# Patient Record
Sex: MALE | Race: WHITE | NOT HISPANIC OR LATINO | Employment: FULL TIME | URBAN - METROPOLITAN AREA
[De-identification: names, ages, dates, MRNs, and addresses within clinical notes are randomized per-mention and may not be internally consistent; named-entity substitution may affect disease eponyms.]

---

## 2017-01-16 ENCOUNTER — TELEPHONE (OUTPATIENT)
Dept: OPHTHALMOLOGY | Facility: CLINIC | Age: 62
End: 2017-01-16

## 2017-01-16 NOTE — TELEPHONE ENCOUNTER
Called pt LAUREN regarding the two appointments he has scheduled, 2/1/17 and 2/3/17 which appt is he coming in on. Call our office ASAP [phone].

## 2017-01-17 ENCOUNTER — TELEPHONE (OUTPATIENT)
Dept: OPHTHALMOLOGY | Facility: CLINIC | Age: 62
End: 2017-01-17

## 2017-02-03 ENCOUNTER — OFFICE VISIT (OUTPATIENT)
Dept: OPTOMETRY | Facility: CLINIC | Age: 62
End: 2017-02-03
Payer: COMMERCIAL

## 2017-02-03 DIAGNOSIS — H52.4 PRESBYOPIA: ICD-10-CM

## 2017-02-03 DIAGNOSIS — H11.432 CONJUNCTIVAL INJECTION, LEFT: ICD-10-CM

## 2017-02-03 DIAGNOSIS — H52.13 MYOPIA, BILATERAL: ICD-10-CM

## 2017-02-03 DIAGNOSIS — H25.13 NS (NUCLEAR SCLEROSIS), BILATERAL: Primary | ICD-10-CM

## 2017-02-03 PROCEDURE — 99999 PR PBB SHADOW E&M-EST. PATIENT-LVL II: CPT | Mod: PBBFAC,,, | Performed by: OPTOMETRIST

## 2017-02-03 PROCEDURE — 92014 COMPRE OPH EXAM EST PT 1/>: CPT | Mod: S$GLB,,, | Performed by: OPTOMETRIST

## 2017-02-03 PROCEDURE — 92015 DETERMINE REFRACTIVE STATE: CPT | Mod: S$GLB,,, | Performed by: OPTOMETRIST

## 2017-02-03 RX ORDER — SERTRALINE HYDROCHLORIDE 50 MG/1
1 TABLET, FILM COATED ORAL DAILY
Refills: 1 | COMMUNITY
Start: 2016-12-11 | End: 2017-02-03 | Stop reason: ALTCHOICE

## 2017-02-03 RX ORDER — DEXTROAMPHETAMINE SACCHARATE, AMPHETAMINE ASPARTATE MONOHYDRATE, DEXTROAMPHETAMINE SULFATE AND AMPHETAMINE SULFATE 2.5; 2.5; 2.5; 2.5 MG/1; MG/1; MG/1; MG/1
1 CAPSULE, EXTENDED RELEASE ORAL DAILY
Refills: 0 | COMMUNITY
Start: 2017-01-18 | End: 2019-04-05

## 2017-02-03 RX ORDER — METHYLPHENIDATE HYDROCHLORIDE 20 MG/1
1 TABLET ORAL DAILY
Refills: 0 | COMMUNITY
Start: 2017-01-02 | End: 2019-04-05

## 2017-02-03 RX ORDER — DEXTROAMPHETAMINE SACCHARATE, AMPHETAMINE ASPARTATE MONOHYDRATE, DEXTROAMPHETAMINE SULFATE AND AMPHETAMINE SULFATE 7.5; 7.5; 7.5; 7.5 MG/1; MG/1; MG/1; MG/1
1 CAPSULE, EXTENDED RELEASE ORAL DAILY
Refills: 0 | COMMUNITY
Start: 2017-01-02 | End: 2021-09-09 | Stop reason: SDUPTHER

## 2017-02-03 NOTE — PROGRESS NOTES
HPI     Blurred Vision    Additional comments: computer vision is better with out RX           Comments   61 yr old here for continued care for his [employer] [facility].  History of   Cataracts ou.  Pt states that he wears trifocal glasses and wearing them   full time.  He states that he noticed that he is having trouble seeing the   computer, he takes the glasses off and can see the computer perfectly.  He   has also noticed a slight decrease in the distance vision.  Reading vision   is fine with glasses wear.  This change started 2-3 months ago and   gradually is getting worse.    No pain  No headache  +diplopia.  had two episodes of diplopia after working out.  Images were   side by side, lasted 15 secs.  He closed his eyes and after opening   diplopia was gone.    No flashes or floaters  History of ocular migraines  No drops.        Last edited by Keli Rivas on 2/3/2017  8:14 AM. (History)            Assessment /Plan     For exam results, see Encounter Report.    NS (nuclear sclerosis), bilateral   Mild, monitor    Conjunctival injection, left   Use systane BID/PRN    Myopia, bilateral  Presbyopia   Current specs oK   Rx specs for computer/ reading for work    Intermittent diplopia 2 times in the past year   Both episodes after exercise   Vision returned to normal within seconds   No ocular abnormalities today   Return if diplopia persists or vision does not return to normal    RTC 1 year DFE

## 2017-03-17 ENCOUNTER — PATIENT OUTREACH (OUTPATIENT)
Dept: ADMINISTRATIVE | Facility: HOSPITAL | Age: 62
End: 2017-03-17

## 2017-04-03 ENCOUNTER — OFFICE VISIT (OUTPATIENT)
Dept: INTERNAL MEDICINE | Facility: CLINIC | Age: 62
End: 2017-04-03
Attending: INTERNAL MEDICINE
Payer: COMMERCIAL

## 2017-04-03 VITALS
OXYGEN SATURATION: 97 % | SYSTOLIC BLOOD PRESSURE: 100 MMHG | DIASTOLIC BLOOD PRESSURE: 70 MMHG | WEIGHT: 201.5 LBS | HEART RATE: 76 BPM | BODY MASS INDEX: 25.86 KG/M2 | HEIGHT: 74 IN

## 2017-04-03 DIAGNOSIS — Z00.00 ANNUAL PHYSICAL EXAM: Primary | ICD-10-CM

## 2017-04-03 DIAGNOSIS — Z12.5 SCREENING PSA (PROSTATE SPECIFIC ANTIGEN): ICD-10-CM

## 2017-04-03 DIAGNOSIS — C61 PROSTATE CANCER: ICD-10-CM

## 2017-04-03 DIAGNOSIS — D22.9 ATYPICAL NEVUS: ICD-10-CM

## 2017-04-03 PROCEDURE — 99999 PR PBB SHADOW E&M-EST. PATIENT-LVL III: CPT | Mod: PBBFAC,,, | Performed by: INTERNAL MEDICINE

## 2017-04-03 PROCEDURE — 93010 ELECTROCARDIOGRAM REPORT: CPT | Mod: S$GLB,,, | Performed by: INTERNAL MEDICINE

## 2017-04-03 PROCEDURE — 93005 ELECTROCARDIOGRAM TRACING: CPT | Mod: S$GLB,,, | Performed by: INTERNAL MEDICINE

## 2017-04-03 PROCEDURE — 99396 PREV VISIT EST AGE 40-64: CPT | Mod: S$GLB,,, | Performed by: INTERNAL MEDICINE

## 2017-04-03 RX ORDER — CLONAZEPAM 0.12 MG/1
TABLET, ORALLY DISINTEGRATING ORAL
Refills: 0 | COMMUNITY
Start: 2017-03-31 | End: 2019-04-05

## 2017-04-03 RX ORDER — DEXTROAMPHETAMINE SACCHARATE, AMPHETAMINE ASPARTATE MONOHYDRATE, DEXTROAMPHETAMINE SULFATE AND AMPHETAMINE SULFATE 5; 5; 5; 5 MG/1; MG/1; MG/1; MG/1
CAPSULE, EXTENDED RELEASE ORAL
Refills: 0 | COMMUNITY
Start: 2017-02-03 | End: 2021-09-09 | Stop reason: SDUPTHER

## 2017-04-03 NOTE — PROGRESS NOTES
Subjective:       Patient ID: Lewis Fish is a 61 y.o. male.    Chief Complaint: Annual Exam     Lewis Fish is a 61 y.o.  male who presents for Annual Exam  .  Patient Active Problem List   Diagnosis    Prostate cancer    Atypical nevus    ADHD (attention deficit hyperactivity disorder)    CHEO (generalized anxiety disorder)     HPI Comments:  Lewis Fish is a 61 y.o.  male who presents for Annual Exam  He is doing well, exercising regularly.  Concerned a mole on his chest, started several months ago, feels it may be getting bigger.      Health Maintenance       Date Due Completion Date    TETANUS VACCINE 11/20/1975 ---    Influenza Vaccine 8/1/2016 11/3/2015 (Done)    Override on 11/3/2015: Done    PROSTATE-SPECIFIC ANTIGEN 1/28/2017 1/28/2016    Colonoscopy 7/12/2021 7/12/2011 (Done)    Override on 7/12/2011: Done    Lipid Panel 11/30/2021 11/30/2016          Review of Systems   Constitutional: Negative for chills and fever.   HENT: Negative for rhinorrhea and sore throat.    Respiratory: Negative for cough and shortness of breath.    Cardiovascular: Negative for chest pain and palpitations.   Gastrointestinal: Negative for nausea and vomiting.   Genitourinary: Negative for dysuria and hematuria.   Musculoskeletal: Negative for arthralgias and back pain.   Skin: Negative for color change and rash.   Neurological: Negative for weakness and numbness.   Psychiatric/Behavioral: Negative for agitation and dysphoric mood.         Past Medical History:   Diagnosis Date    ADHD (attention deficit hyperactivity disorder)     Allergy seasonal    Anxiety     Depression     Family history of early CAD     brother with CAD age 49    History of psychiatric care     Prostate cancer prostate    bradytherapy    Psychiatric problem     Therapy        Past Surgical History:   Procedure Laterality Date    ANKLE LIGAMENT RECONSTRUCTION  2014    right    HERNIA REPAIR  1996    inguinal bilateral    testicle  "removed      complication of hernia repair    TUMOR REMOVAL  1996    nail bed       Family History   Problem Relation Age of Onset    Arthritis Mother     Skin cancer Mother     Asthma Brother     Alcohol abuse Maternal Grandfather     Alcohol abuse Paternal Grandmother     Skin cancer Maternal Grandmother     Macular degeneration Maternal Grandmother     Coronary artery disease Brother 49    Glaucoma Neg Hx     Strabismus Neg Hx     Stroke Neg Hx     Blindness Neg Hx        Social History   Substance Use Topics    Smoking status: Never Smoker    Smokeless tobacco: None    Alcohol use 0.6 oz/week     1 Cans of beer per week      Comment: less than one a week             Objective:   Blood pressure 100/70, pulse 76, height 6' 2" (1.88 m), weight 91.4 kg (201 lb 8 oz), SpO2 97 %.     Physical Exam   Constitutional: He is oriented to person, place, and time. He appears well-developed and well-nourished. No distress.   HENT:   Head: Normocephalic and atraumatic.   Right Ear: External ear normal.   Left Ear: External ear normal.   Eyes: Conjunctivae are normal. No scleral icterus.   Neck: No JVD present. No thyromegaly present.   Cardiovascular: Normal heart sounds.  Exam reveals no gallop and no friction rub.    No murmur heard.  Pulmonary/Chest: Effort normal and breath sounds normal. He has no wheezes. He has no rales.   Abdominal: Soft. Bowel sounds are normal. He exhibits no distension. There is no tenderness.   Musculoskeletal: He exhibits no edema or tenderness.   Lymphadenopathy:     He has no cervical adenopathy.   Neurological: He is alert and oriented to person, place, and time.   Skin: Skin is warm and dry.   4mm raised eyrthematous plaque on left chest wall   Psychiatric: He has a normal mood and affect. Thought content normal.       Prior labs reviewed  Assessment/Plan:        Lewis was seen today for annual exam.    Diagnoses and all orders for this visit:    Annual physical exam  -     " EKG 12-lead baseline  Recommend daily sunscreen, cardiovascular exercise min 30 min 5 days per week. Seatbelts routinely.      Atypical nevus  -     Ambulatory Referral to Dermatology    Prostate cancer  -     PSA, Screening; Future  Screening PSA (prostate specific antigen)  Follows with fernando    Other orders  -     Cancel: Pneumococcal Conjugate Vaccine (13 Valent) (IM)  -     Cancel: Tdap Vaccine  -     Cancel: PSA, Screening; Future

## 2017-04-03 NOTE — MR AVS SNAPSHOT
[facility] - Internal Medicine  [address]  [place] [address]  Phone: [phone]  Fax: [phone]                  Lewis Fish   4/3/2017 10:00 AM   Office Visit    Description:  Male : 1955   Provider:  Arlene Finch MD   Department:  [facility] Internal Medicine           Reason for Visit     Annual Exam           Diagnoses this Visit        Comments    Annual physical exam    -  Primary     Screening PSA (prostate specific antigen)         Atypical nevus         Prostate cancer                To Do List           Future Appointments        Provider Department Dept Phone    2017 7:30 AM LAB, SAME DAY [facility] [phone]      Goals (5 Years of Data)     None      Follow-Up and Disposition     Return in about 1 year (around 4/3/2018) for labs now, RTC for annual physical.    Follow-up and Disposition History      [facility]     [facility] Nurse Care Line -  Assistance  Unless otherwise directed by your provider, please contact [facility], our nurse care line that is available for  assistance.     Registered nurses in the [facility] Center provide: appointment scheduling, clinical advisement, health education, and other advisory services.  Call: [phone] (toll free)               Medications                Verify that the below list of medications is an accurate representation of the medications you are currently taking.  If none reported, the list may be blank. If incorrect, please contact your healthcare provider. Carry this list with you in case of emergency.           Current Medications     cetirizine (ZYRTEC) 10 MG tablet Take 10 mg by mouth once daily.    dextroamphetamine-amphetamine (ADDERALL XR) 20 MG 24 hr capsule TK 2 CAPSULES PO ONCE D    methylphenidate (RITALIN) 20 MG tablet Take 1 tablet by mouth once daily.    sertraline (ZOLOFT) 100 MG tablet Take 1 tablet (100 mg total) by mouth every evening.     "tadalafil (CIALIS) 20 MG Tab Take 1 tablet (20 mg total) by mouth every other day. Take every other day as needed    clonazepam (KLONOPIN) 0.125 MG disintegrating tablet TK 1 OR 2 TABLETS PO QPM    dextroamphetamine-amphetamine (ADDERALL XR) 10 MG 24 hr capsule Take 1 capsule by mouth once daily.    dextroamphetamine-amphetamine (ADDERALL XR) 30 MG 24 hr capsule Take 1 capsule by mouth once daily.           Clinical Reference Information           Your Vitals Were     BP Pulse Height Weight SpO2 BMI    100/70 (BP Location: Left arm, Patient Position: Sitting, BP Method: Manual) 76 6' 2" (1.88 m) 91.4 kg (201 lb 8 oz) 97% 25.87 kg/m2      Blood Pressure          Most Recent Value    BP  100/70      Allergies as of 4/3/2017     No Known Allergies      Immunizations Administered on Date of Encounter - 4/3/2017     None      Orders Placed During Today's Visit      Normal Orders This Visit    Ambulatory Referral to Dermatology     EKG 12-lead     Future Labs/Procedures Expected by Expires    PSA, Screening  4/3/2017 6/2/2018    Comprehensive metabolic panel  4/3/2018 (Approximate) 4/3/2019    Lipid panel  4/3/2018 (Approximate) 4/3/2019      Instructions    Your test results will be communicated to you via: [facility], Telephone or Letter.  If you have not received your test results within one week. Please contact the clinic at [phone].      Please contact Dr. Vandana Benson MD in Dermatology department to schedule your appointment at [phone].         Language Assistance Services     ATTENTION: Language assistance services are available, free of charge. Please call [phone].      ATENCIÓN: Si habla español, tiene a bermudez disposición servicios gratuitos de asistencia lingüística. Llame al [phone].     CHÚ Ý: N?u b?n nói Ti?ng Vi?t, có các d?ch v? h? tr? ngôn ng? mi?n phí dành cho b?n. G?i s? [phone].         [facility] complies with applicable Federal civil rights laws and does " not discriminate on the basis of race, color, national origin, age, disability, or sex.

## 2017-04-03 NOTE — PATIENT INSTRUCTIONS
Your test results will be communicated to you via: [facility], Telephone or Letter.  If you have not received your test results within one week. Please contact the clinic at [phone].      Please contact Dr. Vandana Benson MD in Dermatology department to schedule your appointment at [phone].

## 2017-04-03 NOTE — PROGRESS NOTES
Able to schedule appt with Derm Dr. Benson, pt will contact office to schedule appt per his convenience

## 2017-04-07 ENCOUNTER — LAB VISIT (OUTPATIENT)
Dept: LAB | Facility: OTHER | Age: 62
End: 2017-04-07
Attending: INTERNAL MEDICINE
Payer: COMMERCIAL

## 2017-04-07 DIAGNOSIS — C61 PROSTATE CANCER: ICD-10-CM

## 2017-04-07 DIAGNOSIS — Z00.00 ANNUAL PHYSICAL EXAM: ICD-10-CM

## 2017-04-07 LAB
ALBUMIN SERPL BCP-MCNC: 4.1 G/DL
ALP SERPL-CCNC: 58 U/L
ALT SERPL W/O P-5'-P-CCNC: 21 U/L
ANION GAP SERPL CALC-SCNC: 8 MMOL/L
AST SERPL-CCNC: 31 U/L
BILIRUB SERPL-MCNC: 1.6 MG/DL
BUN SERPL-MCNC: 21 MG/DL
CALCIUM SERPL-MCNC: 8.9 MG/DL
CHLORIDE SERPL-SCNC: 107 MMOL/L
CHOLEST/HDLC SERPL: 4.9 {RATIO}
CO2 SERPL-SCNC: 26 MMOL/L
COMPLEXED PSA SERPL-MCNC: 0.02 NG/ML
CREAT SERPL-MCNC: 1.3 MG/DL
EST. GFR  (AFRICAN AMERICAN): >60 ML/MIN/1.73 M^2
EST. GFR  (NON AFRICAN AMERICAN): 60 ML/MIN/1.73 M^2
GLUCOSE SERPL-MCNC: 98 MG/DL
HDL/CHOLESTEROL RATIO: 20.5 %
HDLC SERPL-MCNC: 171 MG/DL
HDLC SERPL-MCNC: 35 MG/DL
LDLC SERPL CALC-MCNC: 106.4 MG/DL
NONHDLC SERPL-MCNC: 136 MG/DL
POTASSIUM SERPL-SCNC: 4.6 MMOL/L
PROT SERPL-MCNC: 6.7 G/DL
SODIUM SERPL-SCNC: 141 MMOL/L
TRIGL SERPL-MCNC: 148 MG/DL

## 2017-04-07 PROCEDURE — 36415 COLL VENOUS BLD VENIPUNCTURE: CPT

## 2017-04-07 PROCEDURE — 80053 COMPREHEN METABOLIC PANEL: CPT

## 2017-04-07 PROCEDURE — 84153 ASSAY OF PSA TOTAL: CPT

## 2017-04-07 PROCEDURE — 80061 LIPID PANEL: CPT

## 2018-02-04 ENCOUNTER — PATIENT MESSAGE (OUTPATIENT)
Dept: INTERNAL MEDICINE | Facility: CLINIC | Age: 63
End: 2018-02-04

## 2018-02-04 DIAGNOSIS — N53.9 MALE SEXUAL DYSFUNCTION: Primary | ICD-10-CM

## 2018-02-05 RX ORDER — TADALAFIL 20 MG/1
20 TABLET ORAL DAILY
Qty: 10 TABLET | Refills: 11 | Status: SHIPPED | OUTPATIENT
Start: 2018-02-05 | End: 2018-02-09 | Stop reason: SDUPTHER

## 2018-02-05 NOTE — TELEPHONE ENCOUNTER
Pt requesting to restart cialis. Does not need visit.  No pharmacy listed. Please call in prescription to pharmacy of his choice as I entered into his med list.

## 2018-02-09 ENCOUNTER — TELEPHONE (OUTPATIENT)
Dept: INTERNAL MEDICINE | Facility: CLINIC | Age: 63
End: 2018-02-09

## 2018-02-09 RX ORDER — TADALAFIL 20 MG/1
20 TABLET ORAL DAILY PRN
Qty: 10 TABLET | Refills: 11 | Status: SHIPPED | OUTPATIENT
Start: 2018-02-09 | End: 2018-02-14 | Stop reason: SDUPTHER

## 2018-02-14 ENCOUNTER — TELEPHONE (OUTPATIENT)
Dept: INTERNAL MEDICINE | Facility: CLINIC | Age: 63
End: 2018-02-14

## 2018-02-14 DIAGNOSIS — N53.9 MALE SEXUAL DYSFUNCTION: ICD-10-CM

## 2018-02-14 RX ORDER — TADALAFIL 20 MG/1
20 TABLET ORAL DAILY PRN
Qty: 10 TABLET | Refills: 11 | Status: SHIPPED | OUTPATIENT
Start: 2018-02-14 | End: 2018-02-14 | Stop reason: SDUPTHER

## 2018-02-14 RX ORDER — TADALAFIL 20 MG/1
20 TABLET ORAL DAILY PRN
Qty: 10 TABLET | Refills: 11 | Status: SHIPPED | OUTPATIENT
Start: 2018-02-14 | End: 2023-02-14

## 2018-02-20 ENCOUNTER — OFFICE VISIT (OUTPATIENT)
Dept: OPTOMETRY | Facility: CLINIC | Age: 63
End: 2018-02-20
Payer: COMMERCIAL

## 2018-02-20 DIAGNOSIS — H52.4 PRESBYOPIA: ICD-10-CM

## 2018-02-20 DIAGNOSIS — H52.13 MYOPIA, BILATERAL: ICD-10-CM

## 2018-02-20 DIAGNOSIS — H25.13 NS (NUCLEAR SCLEROSIS), BILATERAL: Primary | ICD-10-CM

## 2018-02-20 PROCEDURE — 99999 PR PBB SHADOW E&M-EST. PATIENT-LVL II: CPT | Mod: PBBFAC,,, | Performed by: OPTOMETRIST

## 2018-02-20 PROCEDURE — 92015 DETERMINE REFRACTIVE STATE: CPT | Mod: S$GLB,,, | Performed by: OPTOMETRIST

## 2018-02-20 PROCEDURE — 92014 COMPRE OPH EXAM EST PT 1/>: CPT | Mod: S$GLB,,, | Performed by: OPTOMETRIST

## 2018-02-27 NOTE — PROGRESS NOTES
HPI     Patient's age: 62 y.o.    Approximate date of last eye examination:  2/3/17  Name of last eye doctor seen: Dr. Whipple    Pt states that he is here for annual eye exam, been having trouble with   computer/reading glasses not so clear left in car.    Wears glasses? yes       Wears CLs?:  no            Headaches?  no  Eye pain/discomfort?  no                                                                                     Flashes?  no  Floaters?  no  Diplopia/Double vision?  no    Patient's Ocular History:         Any eye surgeries? no         Family history of eye disease?  no    Significant patient medical history:         1. Diabetes?  no       If yes, IDDM or NIDDM? no   2. HBP?  no                 ! OTC eyedrops currently using:  none   ! Prescription eye meds currently using:  none         Last edited by Marla Jeong MA on 2/20/2018  9:24 AM. (History)            Assessment /Plan     For exam results, see Encounter Report.    NS (nuclear sclerosis), bilateral    Myopia, bilateral    Presbyopia      Good overall ocular health, monitor yearly    RX specs

## 2019-01-17 ENCOUNTER — LAB VISIT (OUTPATIENT)
Dept: LAB | Facility: OTHER | Age: 64
End: 2019-01-17
Attending: INTERNAL MEDICINE
Payer: COMMERCIAL

## 2019-01-17 ENCOUNTER — OFFICE VISIT (OUTPATIENT)
Dept: INTERNAL MEDICINE | Facility: CLINIC | Age: 64
End: 2019-01-17
Attending: INTERNAL MEDICINE
Payer: COMMERCIAL

## 2019-01-17 VITALS
WEIGHT: 203.94 LBS | HEIGHT: 74 IN | OXYGEN SATURATION: 98 % | HEART RATE: 93 BPM | BODY MASS INDEX: 26.17 KG/M2 | SYSTOLIC BLOOD PRESSURE: 120 MMHG | DIASTOLIC BLOOD PRESSURE: 60 MMHG

## 2019-01-17 DIAGNOSIS — Z00.00 ANNUAL PHYSICAL EXAM: ICD-10-CM

## 2019-01-17 DIAGNOSIS — N52.9 ERECTILE DYSFUNCTION, UNSPECIFIED ERECTILE DYSFUNCTION TYPE: ICD-10-CM

## 2019-01-17 DIAGNOSIS — Z00.00 ANNUAL PHYSICAL EXAM: Primary | ICD-10-CM

## 2019-01-17 DIAGNOSIS — R68.82 LOSS OF LIBIDO: ICD-10-CM

## 2019-01-17 DIAGNOSIS — Z85.46 PERSONAL HISTORY OF PROSTATE CANCER: ICD-10-CM

## 2019-01-17 LAB
ALBUMIN SERPL BCP-MCNC: 4.2 G/DL
ALP SERPL-CCNC: 62 U/L
ALT SERPL W/O P-5'-P-CCNC: 21 U/L
ANION GAP SERPL CALC-SCNC: 9 MMOL/L
AST SERPL-CCNC: 17 U/L
BILIRUB SERPL-MCNC: 1.8 MG/DL
BUN SERPL-MCNC: 21 MG/DL
CALCIUM SERPL-MCNC: 9.4 MG/DL
CHLORIDE SERPL-SCNC: 106 MMOL/L
CO2 SERPL-SCNC: 27 MMOL/L
COMPLEXED PSA SERPL-MCNC: <0.01 NG/ML
CREAT SERPL-MCNC: 1.4 MG/DL
EST. GFR  (AFRICAN AMERICAN): >60 ML/MIN/1.73 M^2
EST. GFR  (NON AFRICAN AMERICAN): 54 ML/MIN/1.73 M^2
GLUCOSE SERPL-MCNC: 117 MG/DL
POTASSIUM SERPL-SCNC: 4.2 MMOL/L
PROT SERPL-MCNC: 7.2 G/DL
SODIUM SERPL-SCNC: 142 MMOL/L
T3FREE SERPL-MCNC: 2.7 PG/ML
T4 FREE SERPL-MCNC: 0.84 NG/DL
TSH SERPL DL<=0.005 MIU/L-ACNC: 1.83 UIU/ML

## 2019-01-17 PROCEDURE — 84439 ASSAY OF FREE THYROXINE: CPT

## 2019-01-17 PROCEDURE — 99999 PR PBB SHADOW E&M-EST. PATIENT-LVL IV: CPT | Mod: PBBFAC,,, | Performed by: INTERNAL MEDICINE

## 2019-01-17 PROCEDURE — 36415 COLL VENOUS BLD VENIPUNCTURE: CPT

## 2019-01-17 PROCEDURE — 90471 IMMUNIZATION ADMIN: CPT | Mod: S$GLB,,, | Performed by: INTERNAL MEDICINE

## 2019-01-17 PROCEDURE — 84153 ASSAY OF PSA TOTAL: CPT

## 2019-01-17 PROCEDURE — 99999 PR PBB SHADOW E&M-EST. PATIENT-LVL IV: ICD-10-PCS | Mod: PBBFAC,,, | Performed by: INTERNAL MEDICINE

## 2019-01-17 PROCEDURE — 80053 COMPREHEN METABOLIC PANEL: CPT

## 2019-01-17 PROCEDURE — 99396 PR PREVENTIVE VISIT,EST,40-64: ICD-10-PCS | Mod: S$GLB,,, | Performed by: INTERNAL MEDICINE

## 2019-01-17 PROCEDURE — 84443 ASSAY THYROID STIM HORMONE: CPT

## 2019-01-17 PROCEDURE — 90732 PNEUMOCOCCAL POLYSACCHARIDE VACCINE 23-VALENT =>2YO SQ IM: ICD-10-PCS | Mod: S$GLB,,, | Performed by: INTERNAL MEDICINE

## 2019-01-17 PROCEDURE — 90471 PNEUMOCOCCAL POLYSACCHARIDE VACCINE 23-VALENT =>2YO SQ IM: ICD-10-PCS | Mod: S$GLB,,, | Performed by: INTERNAL MEDICINE

## 2019-01-17 PROCEDURE — 90732 PPSV23 VACC 2 YRS+ SUBQ/IM: CPT | Mod: S$GLB,,, | Performed by: INTERNAL MEDICINE

## 2019-01-17 PROCEDURE — 99396 PREV VISIT EST AGE 40-64: CPT | Mod: S$GLB,,, | Performed by: INTERNAL MEDICINE

## 2019-01-17 PROCEDURE — 84481 FREE ASSAY (FT-3): CPT

## 2019-01-17 NOTE — PROGRESS NOTES
Subjective:       Patient ID: Lewis Fish is a 63 y.o. male.    Chief Complaint: Annual Exam     Lewis Fish is a 63 y.o.  male who presents for Annual Exam  .  Patient Active Problem List   Diagnosis    Prostate cancer    Atypical nevus    ADHD (attention deficit hyperactivity disorder)    CHEO (generalized anxiety disorder)     Here for annual exam.      Complains of loss of libido. Affecting his relationship. Denies relationship stress other than this issue.  Has history of ED and prostate cancer.  No loss of vigor or muscle mass. Exericses strenuously and lifts weights.  Sleeping well. Feels connected and loving towards wife.  Denies depression. Seeing psych for ADHD and anxiety. Has been treated with zoloft for several years without prior issues.      Health Maintenance       Date Due Completion Date    TETANUS VACCINE 11/20/1975 ---    Pneumococcal Vaccine (Highest Risk) (1 of 3 - PCV13) 11/20/1976 ---    Zoster Vaccine 11/20/2017 ---    PROSTATE-SPECIFIC ANTIGEN 04/07/2018 4/7/2017    Influenza Vaccine 08/01/2018 1/9/2017 (Done)    Override on 1/9/2017: Done    Override on 11/3/2015: Done    Colonoscopy 07/12/2021 7/12/2011 (Done)    Override on 7/12/2011: Done    Lipid Panel 04/07/2022 4/7/2017          Review of Systems   Constitutional: Negative for chills, fatigue and fever.   HENT: Negative for rhinorrhea and sore throat.    Respiratory: Negative for cough and shortness of breath.    Cardiovascular: Negative for chest pain and palpitations.   Gastrointestinal: Negative for nausea and vomiting.   Genitourinary: Negative for dysuria and hematuria.   Musculoskeletal: Negative for arthralgias and back pain.   Skin: Negative for color change and rash.   Neurological: Negative for weakness and numbness.   Psychiatric/Behavioral: Negative for agitation and dysphoric mood.         Past Medical History:   Diagnosis Date    ADHD (attention deficit hyperactivity disorder)     Allergy seasonal     "Anxiety     Depression     Family history of early CAD     brother with CAD age 49    History of psychiatric care     Prostate cancer prostate    bradytherapy    Psychiatric problem     Therapy        Past Surgical History:   Procedure Laterality Date    ANKLE LIGAMENT RECONSTRUCTION  2014    right    HERNIA REPAIR  1996    inguinal bilateral    testicle removed      complication of hernia repair    TUMOR REMOVAL  1996    nail bed       Family History   Problem Relation Age of Onset    Arthritis Mother     Skin cancer Mother     Asthma Brother     Alcohol abuse Maternal Grandfather     Alcohol abuse Paternal Grandmother     Skin cancer Maternal Grandmother     Macular degeneration Maternal Grandmother     Coronary artery disease Brother 49    Glaucoma Neg Hx     Strabismus Neg Hx     Stroke Neg Hx     Blindness Neg Hx        Social History     Tobacco Use    Smoking status: Never Smoker   Substance Use Topics    Alcohol use: Yes     Alcohol/week: 0.6 oz     Types: 1 Cans of beer per week     Comment: less than one a week    Drug use: No             Objective:   Blood pressure 120/60, pulse 93, height 6' 2" (1.88 m), weight 92.5 kg (203 lb 14.8 oz), SpO2 98 %.     Physical Exam   Constitutional: He is oriented to person, place, and time. He appears well-developed and well-nourished. No distress.   HENT:   Head: Normocephalic and atraumatic.   Right Ear: External ear normal.   Left Ear: External ear normal.   Eyes: Conjunctivae are normal. No scleral icterus.   Neck: No JVD present. No thyromegaly present.   Cardiovascular: Normal heart sounds. Exam reveals no gallop and no friction rub.   No murmur heard.  Pulmonary/Chest: Effort normal and breath sounds normal. He has no wheezes. He has no rales.   No gynecomastia   Abdominal: Soft. Bowel sounds are normal. He exhibits no distension. There is no tenderness.   Musculoskeletal: He exhibits no edema or tenderness.   Normal muscle mass "   Lymphadenopathy:     He has no cervical adenopathy.   Neurological: He is alert and oriented to person, place, and time.   Skin: Skin is warm and dry.   Psychiatric: He has a normal mood and affect. Thought content normal.       Prior labs reviewed  Assessment/Plan:        Lewis was seen today for annual exam.    Diagnoses and all orders for this visit:    Annual physical exam  -     Comprehensive metabolic panel; Future  -     Cancel: PSA, Screening; Future  -     TSH; Future  -     T4, free; Future  -     T3, free; Future  Recommend daily sunscreen, cardiovascular exercise min 30 min 5 days per week. Seatbelts routinely.    Personal history of prostate cancer  -     Prostate Specific Antigen, Diagnostic; Future  Recommend regular PSA    Erectile dysfunction, unspecified erectile dysfunction type  Cont cialis prn    Loss of libido  Unlikely to be related to testosterone due t history and pt not good candidate for replacement due to CA history  Recommend he discuss his zoloft with his psychiatrist and discuss his concerns with his spouse  Follow up with urology if no improvement    Other orders  -     (In Office Administered) Pneumococcal Polysaccharide Vaccine (23 Valent) (SQ/IM)           Medication List           Accurate as of 1/17/19 11:59 PM. If you have any questions, ask your nurse or doctor.               CHANGE how you take these medications    sertraline 100 MG tablet  Commonly known as:  ZOLOFT  Take 1 tablet (100 mg total) by mouth every evening.  What changed:  how much to take        CONTINUE taking these medications    cetirizine 10 MG tablet  Commonly known as:  ZYRTEC     clonazePAM 0.125 MG disintegrating tablet  Commonly known as:  KLONOPIN     * dextroamphetamine-amphetamine 30 MG 24 hr capsule  Commonly known as:  ADDERALL XR     * dextroamphetamine-amphetamine 10 MG 24 hr capsule  Commonly known as:  ADDERALL XR     * dextroamphetamine-amphetamine 20 MG 24 hr capsule  Commonly known as:   ADDERALL XR     methylphenidate HCl 20 MG tablet  Commonly known as:  RITALIN     tadalafil 20 MG Tab  Commonly known as:  CIALIS  Take 1 tablet (20 mg total) by mouth daily as needed.         * This list has 3 medication(s) that are the same as other medications prescribed for you. Read the directions carefully, and ask your doctor or other care provider to review them with you.

## 2019-01-17 NOTE — PROGRESS NOTES
"Patient was given vaccine information sheet for the [id number] (pneumococcal polyvalent) vaccine. The area of injection was palpated using the acromion process as a landmark. This area was cleaned with alcohol. Using a 25g 1" safety needle, 0.5mL of the vaccine was placed into the left muscle. The injection site was dressed with a bandage. Patient experienced no complications and was discharged in stable condition. Pneumovax 23 (pneumococcal polyvalent) vaccine Lot: [id number] Exp: 04/14/2020  "

## 2019-03-20 ENCOUNTER — PATIENT MESSAGE (OUTPATIENT)
Dept: INTERNAL MEDICINE | Facility: CLINIC | Age: 64
End: 2019-03-20

## 2019-04-05 ENCOUNTER — OFFICE VISIT (OUTPATIENT)
Dept: OPTOMETRY | Facility: CLINIC | Age: 64
End: 2019-04-05
Payer: COMMERCIAL

## 2019-04-05 DIAGNOSIS — H52.13 MYOPIA, BILATERAL: ICD-10-CM

## 2019-04-05 DIAGNOSIS — H25.13 NS (NUCLEAR SCLEROSIS), BILATERAL: Primary | ICD-10-CM

## 2019-04-05 DIAGNOSIS — H52.223 REGULAR ASTIGMATISM OF BOTH EYES: ICD-10-CM

## 2019-04-05 DIAGNOSIS — H52.4 PRESBYOPIA: ICD-10-CM

## 2019-04-05 DIAGNOSIS — Z04.9 DISEASE RULED OUT AFTER EXAMINATION: ICD-10-CM

## 2019-04-05 PROCEDURE — 92015 PR REFRACTION: ICD-10-PCS | Mod: S$GLB,,, | Performed by: OPTOMETRIST

## 2019-04-05 PROCEDURE — 92015 DETERMINE REFRACTIVE STATE: CPT | Mod: S$GLB,,, | Performed by: OPTOMETRIST

## 2019-04-05 PROCEDURE — 99999 PR PBB SHADOW E&M-EST. PATIENT-LVL II: ICD-10-PCS | Mod: PBBFAC,,, | Performed by: OPTOMETRIST

## 2019-04-05 PROCEDURE — 99999 PR PBB SHADOW E&M-EST. PATIENT-LVL II: CPT | Mod: PBBFAC,,, | Performed by: OPTOMETRIST

## 2019-04-05 PROCEDURE — 92014 COMPRE OPH EXAM EST PT 1/>: CPT | Mod: S$GLB,,, | Performed by: OPTOMETRIST

## 2019-04-05 PROCEDURE — 92014 PR EYE EXAM, EST PATIENT,COMPREHESV: ICD-10-PCS | Mod: S$GLB,,, | Performed by: OPTOMETRIST

## 2019-04-05 NOTE — PROGRESS NOTES
HPI     Pt here for annual exam. Pt says hes had some trouble with computer   distance. He feels like he has to get close to be able to see the screen.   Pt denies any flashes/floaters, itching/burning, eye pain, or double   vision. Pt does not use any eye gtts.     Last edited by Kerri Mix on 4/5/2019  1:37 PM. (History)            Assessment /Plan     For exam results, see Encounter Report.    NS (nuclear sclerosis), bilateral   Mild, monitor    Regular astigmatism of both eyes  Myopia, bilateral  Presbyopia    Disease ruled out after examination      Good overall ocular health, monitor yearly     RX specs

## 2019-12-16 ENCOUNTER — OFFICE VISIT (OUTPATIENT)
Dept: DERMATOLOGY | Facility: CLINIC | Age: 64
End: 2019-12-16
Payer: COMMERCIAL

## 2019-12-16 DIAGNOSIS — R20.2 NOTALGIA PARESTHETICA: ICD-10-CM

## 2019-12-16 DIAGNOSIS — D48.5 NEOPLASM OF UNCERTAIN BEHAVIOR OF SKIN: Primary | ICD-10-CM

## 2019-12-16 PROCEDURE — 99201 PR OFFICE/OUTPT VISIT,NEW,LEVL I: CPT | Mod: 25,S$GLB,, | Performed by: DERMATOLOGY

## 2019-12-16 PROCEDURE — 99201 PR OFFICE/OUTPT VISIT,NEW,LEVL I: ICD-10-PCS | Mod: 25,S$GLB,, | Performed by: DERMATOLOGY

## 2019-12-16 PROCEDURE — 11102 PR TANGENTIAL BIOPSY, SKIN, SINGLE LESION: ICD-10-PCS | Mod: S$GLB,,, | Performed by: DERMATOLOGY

## 2019-12-16 PROCEDURE — 88305 TISSUE EXAM BY PATHOLOGIST: CPT | Performed by: PATHOLOGY

## 2019-12-16 PROCEDURE — 88305 TISSUE EXAM BY PATHOLOGIST: ICD-10-PCS | Mod: 26,,, | Performed by: PATHOLOGY

## 2019-12-16 PROCEDURE — 99999 PR PBB SHADOW E&M-EST. PATIENT-LVL II: ICD-10-PCS | Mod: PBBFAC,,, | Performed by: DERMATOLOGY

## 2019-12-16 PROCEDURE — 99999 PR PBB SHADOW E&M-EST. PATIENT-LVL II: CPT | Mod: PBBFAC,,, | Performed by: DERMATOLOGY

## 2019-12-16 PROCEDURE — 88305 TISSUE EXAM BY PATHOLOGIST: CPT | Mod: 26,,, | Performed by: PATHOLOGY

## 2019-12-16 PROCEDURE — 11102 TANGNTL BX SKIN SINGLE LES: CPT | Mod: S$GLB,,, | Performed by: DERMATOLOGY

## 2019-12-16 NOTE — PROGRESS NOTES
Subjective:       Patient ID:  Lewis Fish is a 64 y.o. male who presents for   Chief Complaint   Patient presents with    Lesion     Pt c/o dark colored lesion on chest x a many years. No bleeding, pain or prev tx.   Pt c/o burning sensation on back x 1 week. No prev tx. Started after soft tissue injury to back.  Is improving      Review of Systems   Skin: Negative for tendency to form keloidal scars.   Hematologic/Lymphatic: Does not bruise/bleed easily.        Objective:    Physical Exam   Skin:   Areas Examined (abnormalities noted in diagram):   Chest / Axilla Inspection Performed  Back Inspection Performed                  Diagram Legend     Erythematous scaling macule/papule c/w actinic keratosis       Vascular papule c/w angioma      Pigmented verrucoid papule/plaque c/w seborrheic keratosis      Yellow umbilicated papule c/w sebaceous hyperplasia      Irregularly shaped tan macule c/w lentigo     1-2 mm smooth white papules consistent with Milia      Movable subcutaneous cyst with punctum c/w epidermal inclusion cyst      Subcutaneous movable cyst c/w pilar cyst      Firm pink to brown papule c/w dermatofibroma      Pedunculated fleshy papule(s) c/w skin tag(s)      Evenly pigmented macule c/w junctional nevus     Mildly variegated pigmented, slightly irregular-bordered macule c/w mildly atypical nevus      Flesh colored to evenly pigmented papule c/w intradermal nevus       Pink pearly papule/plaque c/w basal cell carcinoma      Erythematous hyperkeratotic cursted plaque c/w SCC      Surgical scar with no sign of skin cancer recurrence      Open and closed comedones      Inflammatory papules and pustules      Verrucoid papule consistent consistent with wart     Erythematous eczematous patches and plaques     Dystrophic onycholytic nail with subungual debris c/w onychomycosis     Umbilicated papule    Erythematous-base heme-crusted tan verrucoid plaque consistent with inflamed seborrheic keratosis      Erythematous Silvery Scaling Plaque c/w Psoriasis     See annotation      Assessment / Plan:      Pathology Orders:     Normal Orders This Visit    Specimen to Pathology, Dermatology     Questions:    Procedure Type:  Dermatology and skin neoplasms    Number of Specimens:  1    ------------------------:  -------------------------    Spec 1 Procedure:  Biopsy    Spec 1 Clinical Impression:  r/o inflamed keratosis v bcc v other    Spec 1 Source:  right upper chest        Neoplasm of uncertain behavior of skin  Shave biopsy procedure note:    Shave biopsy performed after verbal consent including risk of infection, scar, recurrence, need for additional treatment of site. Area prepped with alcohol, anesthetized with approximately 1.0cc of 1% lidocaine with epinephrine. Lesional tissue shaved with razor blade. Hemostasis achieved with application of aluminum chloride followed by hyfrecation. No complications. Dressing applied. Wound care explained.      -     Specimen to Pathology, Dermatology    Notalgia paresthetica  This condition is thought to be related to a nerve under the skin  Can be triggered by stress, hot showers, and exacerbated by chronic rubbing  Cold compresses, sarna lotion can soothe the itching sensation  reassurance           Follow up for prn bx report.

## 2019-12-25 LAB
FINAL PATHOLOGIC DIAGNOSIS: NORMAL
GROSS: NORMAL
MICROSCOPIC EXAM: NORMAL

## 2019-12-31 ENCOUNTER — PATIENT MESSAGE (OUTPATIENT)
Dept: DERMATOLOGY | Facility: CLINIC | Age: 64
End: 2019-12-31

## 2020-10-12 ENCOUNTER — OFFICE VISIT (OUTPATIENT)
Dept: INTERNAL MEDICINE | Facility: CLINIC | Age: 65
End: 2020-10-12
Attending: INTERNAL MEDICINE
Payer: COMMERCIAL

## 2020-10-12 ENCOUNTER — LAB VISIT (OUTPATIENT)
Dept: LAB | Facility: OTHER | Age: 65
End: 2020-10-12
Attending: INTERNAL MEDICINE
Payer: COMMERCIAL

## 2020-10-12 VITALS
WEIGHT: 211.19 LBS | OXYGEN SATURATION: 98 % | HEIGHT: 74 IN | BODY MASS INDEX: 27.1 KG/M2 | SYSTOLIC BLOOD PRESSURE: 122 MMHG | HEART RATE: 77 BPM | DIASTOLIC BLOOD PRESSURE: 82 MMHG

## 2020-10-12 DIAGNOSIS — F90.8 ATTENTION DEFICIT HYPERACTIVITY DISORDER (ADHD), OTHER TYPE: ICD-10-CM

## 2020-10-12 DIAGNOSIS — I83.893 VARICOSE VEINS OF BOTH LEGS WITH EDEMA: ICD-10-CM

## 2020-10-12 DIAGNOSIS — Z85.46 PERSONAL HISTORY OF PROSTATE CANCER: ICD-10-CM

## 2020-10-12 DIAGNOSIS — F41.1 GAD (GENERALIZED ANXIETY DISORDER): ICD-10-CM

## 2020-10-12 DIAGNOSIS — Z00.00 ANNUAL PHYSICAL EXAM: Primary | ICD-10-CM

## 2020-10-12 DIAGNOSIS — Z00.00 ANNUAL PHYSICAL EXAM: ICD-10-CM

## 2020-10-12 LAB
ALBUMIN SERPL BCP-MCNC: 4.2 G/DL (ref 3.5–5.2)
ALP SERPL-CCNC: 58 U/L (ref 55–135)
ALT SERPL W/O P-5'-P-CCNC: 25 U/L (ref 10–44)
ANION GAP SERPL CALC-SCNC: 9 MMOL/L (ref 8–16)
AST SERPL-CCNC: 19 U/L (ref 10–40)
BILIRUB SERPL-MCNC: 1.5 MG/DL (ref 0.1–1)
BNP SERPL-MCNC: 11 PG/ML (ref 0–99)
BUN SERPL-MCNC: 25 MG/DL (ref 8–23)
CALCIUM SERPL-MCNC: 8.9 MG/DL (ref 8.7–10.5)
CHLORIDE SERPL-SCNC: 110 MMOL/L (ref 95–110)
CO2 SERPL-SCNC: 22 MMOL/L (ref 23–29)
CREAT SERPL-MCNC: 1.3 MG/DL (ref 0.5–1.4)
EST. GFR  (AFRICAN AMERICAN): >60 ML/MIN/1.73 M^2
EST. GFR  (NON AFRICAN AMERICAN): 58 ML/MIN/1.73 M^2
ESTIMATED AVG GLUCOSE: 111 MG/DL (ref 68–131)
GLUCOSE SERPL-MCNC: 98 MG/DL (ref 70–110)
HBA1C MFR BLD HPLC: 5.5 % (ref 4–5.6)
POTASSIUM SERPL-SCNC: 4.6 MMOL/L (ref 3.5–5.1)
PROT SERPL-MCNC: 6.5 G/DL (ref 6–8.4)
SODIUM SERPL-SCNC: 141 MMOL/L (ref 136–145)
TSH SERPL DL<=0.005 MIU/L-ACNC: 1.46 UIU/ML (ref 0.4–4)

## 2020-10-12 PROCEDURE — 86703 HIV-1/HIV-2 1 RESULT ANTBDY: CPT

## 2020-10-12 PROCEDURE — 99999 PR PBB SHADOW E&M-EST. PATIENT-LVL III: CPT | Mod: PBBFAC,,, | Performed by: INTERNAL MEDICINE

## 2020-10-12 PROCEDURE — 36415 COLL VENOUS BLD VENIPUNCTURE: CPT

## 2020-10-12 PROCEDURE — 83036 HEMOGLOBIN GLYCOSYLATED A1C: CPT

## 2020-10-12 PROCEDURE — 84443 ASSAY THYROID STIM HORMONE: CPT

## 2020-10-12 PROCEDURE — 83880 ASSAY OF NATRIURETIC PEPTIDE: CPT

## 2020-10-12 PROCEDURE — 99999 PR PBB SHADOW E&M-EST. PATIENT-LVL III: ICD-10-PCS | Mod: PBBFAC,,, | Performed by: INTERNAL MEDICINE

## 2020-10-12 PROCEDURE — 99396 PREV VISIT EST AGE 40-64: CPT | Mod: S$GLB,,, | Performed by: INTERNAL MEDICINE

## 2020-10-12 PROCEDURE — 99396 PR PREVENTIVE VISIT,EST,40-64: ICD-10-PCS | Mod: S$GLB,,, | Performed by: INTERNAL MEDICINE

## 2020-10-12 PROCEDURE — 80053 COMPREHEN METABOLIC PANEL: CPT

## 2020-10-12 NOTE — PATIENT INSTRUCTIONS
Varicose Veins     Varicose veins are swollen, enlarged veins most often found in the legs. They are usually blue or purple in color and may bulge, twist, and stand out under the skin.  Normally, veins return blood from the body to the heart. The leg veins have one-way valves that prevent blood from flowing backward in the vein. When the valves are weak or damaged, blood backs up in the veins. This may cause some of the veins to swell and bulge and become varicose veins.  Symptoms  Varicose veins may or may not cause symptoms. If symptoms do occur, they can include:  · Legs that feel tired, achy, heavy, or itchy  · Leg muscle cramps  · Skin changes, such as discoloration, dryness, redness, or rash (in more severe cases, you may also have sores on the skin called venous leg ulcers)  Risk Factors  There are a number of factors that increase the risk for varicose veins. These can include:  · Being a woman  · Being older  · Sitting or standing for long periods  · Being overweight  · Being pregnant  · Having a family history of varicose veins  Treatment begins with simple self-help measures (see below). If these dont help, there are many procedures that can be done to shrink or remove varicose veins. Your healthcare provider can tell you more about these options, if needed.  Home care  · Support or compression stockings will likely be prescribed. If so, be sure to wear them as directed. They may help improve blood flow.  · Exercising helps strengthen your leg muscles and improve blood flow. To get the most benefit, choose exercises such as walking, swimming, or cycling. Also try to exercise for at least 30 minutes on most days.  · Raising (elevating) your legs lets gravity help blood flow back to the heart. Sit or lie with your feet above heart level a few times throughout the day, or as directed.  · Avoid long periods of sitting or standing. Change positions often. Also, move your ankles, toes and knees often. This  may also help improve blood flow.  · If you are overweight, talk with your healthcare provider about setting up a weight-loss plan. Maintaining a healthy weight can help reduce the strain on your veins. It may also improve symptoms, such as swelling and aching.  · If you have dryness and itching, ask your provider about special lotions that can be applied to the skin to help improve symptoms.  Follow-up care  Follow up with your healthcare provider, or as directed. If imaging tests were done, youll be told the results and if there are any new findings that affect your care.  When to seek medical advice  Call your healthcare provider right away if any of these occur:  · Sudden, severe leg swelling, pain, or redness  · Symptoms worsen, or they dont improve with self-care  · Bleeding from any affected veins  · Ulcers form on the legs, ankles, or feet  · Fever of 100.4°F (38°C) or higher, or as advised by your provider  Date Last Reviewed: 9/21/2015  © 8772-2158 [employer]. [address] [place] [address], [place] [address]. All rights reserved. This information is not intended as a substitute for professional medical care. Always follow your healthcare professional's instructions.

## 2020-10-12 NOTE — PROGRESS NOTES
Subjective:       Patient ID: Lewis Fish is a 64 y.o. male.    Chief Complaint: Annual Exam and Edema (fluid retention)     Lewis Fish is a 64 y.o.  male who presents for Annual Exam and Edema (fluid retention)  .  Problem Noted   Varicose Veins of Both Legs With Edema 10/12/2020   Dipak (Generalized Anxiety Disorder) 2/2/2016    Follow[facility] by dr aurelia encarnacion [facility] for [facility] and medication management       prewsents for annual exam. Has noticed gradual onset of le edema over the last year. Has history of varicose veins.  Has not changed diet, avoid salt, nsaids.  Otherwise no issues. Exercises regularly.  No SILVA, orthopnea or pnd.     Health Maintenance       Date Due Completion Date    HIV Screening 11/20/1972 ---    TETANUS VACCINE 11/20/1975 ---    Shingles Vaccine (1 of 2) 11/20/2007 ---    PROSTATE-SPECIFIC ANTIGEN 01/17/2020 1/17/2019    Pneumococcal Vaccine (Highest Risk) (2 of 3 - PCV13) 01/17/2020 1/17/2019    Influenza Vaccine (1) 08/01/2020 ---    Colorectal Cancer Screening 07/12/2021 7/12/2011 (Done)    Override on 7/12/2011: Done    Lipid Panel 04/07/2022 4/7/2017          Review of Systems   Constitutional: Negative for chills and fever.   HENT: Negative for rhinorrhea and sore throat.    Respiratory: Negative for cough and shortness of breath.    Cardiovascular: Positive for leg swelling. Negative for chest pain and palpitations.   Gastrointestinal: Negative for nausea and vomiting.   Genitourinary: Negative for dysuria and hematuria.   Musculoskeletal: Negative for arthralgias and back pain.   Skin: Negative for color change and rash.   Neurological: Negative for weakness and numbness.   Psychiatric/Behavioral: Negative for agitation and dysphoric mood.         Past Medical History:   Diagnosis Date    ADHD (attention deficit hyperactivity disorder)     Allergy seasonal    Anxiety     Depression     Family history of early CAD     brother with CAD age 49    History of psychiatric  "care     Prostate cancer prostate    bradytherapy    Psychiatric problem     Therapy        Past Surgical History:   Procedure Laterality Date    ANKLE LIGAMENT RECONSTRUCTION  2014    right    HERNIA REPAIR  1996    inguinal bilateral    PROSTATE SURGERY  2008    brachytherapy    testicle removed      complication of hernia repair    TUMOR REMOVAL  1996    nail bed       Family History   Problem Relation Age of Onset    Arthritis Mother     Skin cancer Mother     Asthma Brother     Alcohol abuse Maternal Grandfather     Alcohol abuse Paternal Grandmother     Skin cancer Maternal Grandmother     Macular degeneration Maternal Grandmother     Coronary artery disease Brother 49    Glaucoma Neg Hx     Strabismus Neg Hx     Stroke Neg Hx     Blindness Neg Hx        Social History     Tobacco Use    Smoking status: Never Smoker   Substance Use Topics    Alcohol use: Yes     Alcohol/week: 1.0 standard drinks     Types: 1 Cans of beer per week     Comment: less than one a week    Drug use: No             Objective:   Blood pressure 122/82, pulse 77, height 6' 2" (1.88 m), weight 95.8 kg (211 lb 3.2 oz), SpO2 98 %.     Physical Exam  Constitutional:       General: He is not in acute distress.     Appearance: He is well-developed.   HENT:      Head: Normocephalic and atraumatic.      Right Ear: Tympanic membrane and external ear normal.      Left Ear: Tympanic membrane and external ear normal.      Mouth/Throat:      Pharynx: No oropharyngeal exudate.   Eyes:      General: No scleral icterus.        Right eye: No discharge.         Left eye: No discharge.      Conjunctiva/sclera: Conjunctivae normal.   Neck:      Thyroid: No thyromegaly.   Cardiovascular:      Rate and Rhythm: Normal rate.      Heart sounds: Normal heart sounds. No murmur. No friction rub. No gallop.    Pulmonary:      Effort: Pulmonary effort is normal.      Breath sounds: Normal breath sounds. No wheezing or rales.   Chest:      Chest " wall: No tenderness.   Abdominal:      General: Bowel sounds are normal. There is no distension.      Palpations: Abdomen is soft.      Tenderness: There is no abdominal tenderness.   Musculoskeletal:         General: No tenderness.      Right lower leg: Edema present.      Left lower leg: Edema present.      Comments: 1+ bilateral edema, + varicose veins   Lymphadenopathy:      Cervical: No cervical adenopathy.   Skin:     General: Skin is warm and dry.   Neurological:      Mental Status: He is alert and oriented to person, place, and time.   Psychiatric:         Thought Content: Thought content normal.         Prior labs reviewed  Assessment/Plan:        Lewis was seen today for annual exam and edema.    Diagnoses and all orders for this visit:    Annual physical exam  -     HIV 1/2 Ag/Ab (4th Gen); Future  -     Cancel: Prostate Specific Antigen, Diagnostic; Future  -     Hemoglobin A1C; Future  -     Cancel: Lipid Panel; Future  -     Comprehensive Metabolic Panel; Future  -     TSH; Future  Recommend daily sunscreen, cardiovascular exercise min 30 min 5 days per week. Seatbelts routinely.      Varicose veins of both legs with edema  -     BNP; Future  -     COMPRESSION STOCKINGS  Avoid crossing legs, low salt    Personal history of prostate cancer  KERON, annual psa done at outside facility    Attention deficit hyperactivity disorder (ADHD), other type  CHEO (generalized anxiety disorder)  Followed by dr aurelia encarnacion  Stable, no new issues      Medication List with Changes/Refills   Current Medications    CETIRIZINE (ZYRTEC) 10 MG TABLET    Take 10 mg by mouth once daily.    DEXTROAMPHETAMINE-AMPHETAMINE (ADDERALL XR) 20 MG 24 HR CAPSULE    TK 2 CAPSULES PO ONCE D    DEXTROAMPHETAMINE-AMPHETAMINE (ADDERALL XR) 30 MG 24 HR CAPSULE    Take 1 capsule by mouth once daily.    SERTRALINE (ZOLOFT) 100 MG TABLET    Take 1 tablet (100 mg total) by mouth every evening.    TADALAFIL (CIALIS) 20 MG TAB    Take 1 tablet (20  mg total) by mouth daily as needed.

## 2020-10-13 LAB — HIV 1+2 AB+HIV1 P24 AG SERPL QL IA: NEGATIVE

## 2021-02-22 ENCOUNTER — PATIENT MESSAGE (OUTPATIENT)
Dept: OPTOMETRY | Facility: CLINIC | Age: 66
End: 2021-02-22

## 2021-02-22 ENCOUNTER — OFFICE VISIT (OUTPATIENT)
Dept: OPTOMETRY | Facility: CLINIC | Age: 66
End: 2021-02-22
Payer: COMMERCIAL

## 2021-02-22 DIAGNOSIS — H43.811 PVD (POSTERIOR VITREOUS DETACHMENT), RIGHT EYE: Primary | ICD-10-CM

## 2021-02-22 DIAGNOSIS — Z04.9 DISEASE RULED OUT AFTER EXAMINATION: ICD-10-CM

## 2021-02-22 PROCEDURE — 99999 PR PBB SHADOW E&M-EST. PATIENT-LVL I: ICD-10-PCS | Mod: PBBFAC,,, | Performed by: OPTOMETRIST

## 2021-02-22 PROCEDURE — 92014 PR EYE EXAM, EST PATIENT,COMPREHESV: ICD-10-PCS | Mod: S$GLB,,, | Performed by: OPTOMETRIST

## 2021-02-22 PROCEDURE — 99999 PR PBB SHADOW E&M-EST. PATIENT-LVL I: CPT | Mod: PBBFAC,,, | Performed by: OPTOMETRIST

## 2021-02-22 PROCEDURE — 92014 COMPRE OPH EXAM EST PT 1/>: CPT | Mod: S$GLB,,, | Performed by: OPTOMETRIST

## 2021-03-25 ENCOUNTER — OFFICE VISIT (OUTPATIENT)
Dept: OPTOMETRY | Facility: CLINIC | Age: 66
End: 2021-03-25
Payer: COMMERCIAL

## 2021-03-25 DIAGNOSIS — H52.223 REGULAR ASTIGMATISM OF BOTH EYES: ICD-10-CM

## 2021-03-25 DIAGNOSIS — H43.811 PVD (POSTERIOR VITREOUS DETACHMENT), RIGHT EYE: Primary | ICD-10-CM

## 2021-03-25 DIAGNOSIS — H25.13 NS (NUCLEAR SCLEROSIS), BILATERAL: ICD-10-CM

## 2021-03-25 DIAGNOSIS — H52.4 PRESBYOPIA: ICD-10-CM

## 2021-03-25 DIAGNOSIS — H52.13 MYOPIA, BILATERAL: ICD-10-CM

## 2021-03-25 DIAGNOSIS — Z04.9 DISEASE RULED OUT AFTER EXAMINATION: ICD-10-CM

## 2021-03-25 PROCEDURE — 92015 PR REFRACTION: ICD-10-PCS | Mod: S$GLB,,, | Performed by: OPTOMETRIST

## 2021-03-25 PROCEDURE — 92014 COMPRE OPH EXAM EST PT 1/>: CPT | Mod: S$GLB,,, | Performed by: OPTOMETRIST

## 2021-03-25 PROCEDURE — 92014 PR EYE EXAM, EST PATIENT,COMPREHESV: ICD-10-PCS | Mod: S$GLB,,, | Performed by: OPTOMETRIST

## 2021-03-25 PROCEDURE — 99999 PR PBB SHADOW E&M-EST. PATIENT-LVL II: CPT | Mod: PBBFAC,,, | Performed by: OPTOMETRIST

## 2021-03-25 PROCEDURE — 99999 PR PBB SHADOW E&M-EST. PATIENT-LVL II: ICD-10-PCS | Mod: PBBFAC,,, | Performed by: OPTOMETRIST

## 2021-03-25 PROCEDURE — 92015 DETERMINE REFRACTIVE STATE: CPT | Mod: S$GLB,,, | Performed by: OPTOMETRIST

## 2021-06-26 ENCOUNTER — PATIENT MESSAGE (OUTPATIENT)
Dept: INTERNAL MEDICINE | Facility: CLINIC | Age: 66
End: 2021-06-26

## 2021-06-26 DIAGNOSIS — H91.90 HEARING LOSS, UNSPECIFIED HEARING LOSS TYPE, UNSPECIFIED LATERALITY: Primary | ICD-10-CM

## 2021-07-02 ENCOUNTER — CLINICAL SUPPORT (OUTPATIENT)
Dept: AUDIOLOGY | Facility: CLINIC | Age: 66
End: 2021-07-02
Payer: COMMERCIAL

## 2021-07-02 DIAGNOSIS — H91.93 BILATERAL HIGH FREQUENCY HEARING LOSS: Primary | ICD-10-CM

## 2021-07-02 DIAGNOSIS — H91.90 HEARING LOSS, UNSPECIFIED HEARING LOSS TYPE, UNSPECIFIED LATERALITY: ICD-10-CM

## 2021-07-02 PROCEDURE — 92557 PR COMPREHENSIVE HEARING TEST: ICD-10-PCS | Mod: S$GLB,,, | Performed by: AUDIOLOGIST

## 2021-07-02 PROCEDURE — 92567 PR TYMPA2METRY: ICD-10-PCS | Mod: S$GLB,,, | Performed by: AUDIOLOGIST

## 2021-07-02 PROCEDURE — 92567 TYMPANOMETRY: CPT | Mod: S$GLB,,, | Performed by: AUDIOLOGIST

## 2021-07-02 PROCEDURE — 99999 PR PBB SHADOW E&M-EST. PATIENT-LVL I: CPT | Mod: PBBFAC,,, | Performed by: AUDIOLOGIST

## 2021-07-02 PROCEDURE — 99999 PR PBB SHADOW E&M-EST. PATIENT-LVL I: ICD-10-PCS | Mod: PBBFAC,,, | Performed by: AUDIOLOGIST

## 2021-07-02 PROCEDURE — 92557 COMPREHENSIVE HEARING TEST: CPT | Mod: S$GLB,,, | Performed by: AUDIOLOGIST

## 2021-09-08 ENCOUNTER — PATIENT MESSAGE (OUTPATIENT)
Dept: INTERNAL MEDICINE | Facility: CLINIC | Age: 66
End: 2021-09-08

## 2021-09-08 DIAGNOSIS — F90.9 ATTENTION DEFICIT HYPERACTIVITY DISORDER (ADHD), UNSPECIFIED ADHD TYPE: Primary | ICD-10-CM

## 2021-09-08 DIAGNOSIS — F41.1 GAD (GENERALIZED ANXIETY DISORDER): ICD-10-CM

## 2021-09-09 RX ORDER — DEXTROAMPHETAMINE SACCHARATE, AMPHETAMINE ASPARTATE MONOHYDRATE, DEXTROAMPHETAMINE SULFATE AND AMPHETAMINE SULFATE 5; 5; 5; 5 MG/1; MG/1; MG/1; MG/1
CAPSULE, EXTENDED RELEASE ORAL
Qty: 30 CAPSULE | Refills: 0 | Status: SHIPPED | OUTPATIENT
Start: 2021-09-09 | End: 2021-10-20 | Stop reason: SDUPTHER

## 2021-09-09 RX ORDER — DEXTROAMPHETAMINE SACCHARATE, AMPHETAMINE ASPARTATE MONOHYDRATE, DEXTROAMPHETAMINE SULFATE AND AMPHETAMINE SULFATE 7.5; 7.5; 7.5; 7.5 MG/1; MG/1; MG/1; MG/1
30 CAPSULE, EXTENDED RELEASE ORAL DAILY
Qty: 30 CAPSULE | Refills: 0 | Status: SHIPPED | OUTPATIENT
Start: 2021-09-09 | End: 2021-10-20 | Stop reason: SDUPTHER

## 2021-09-09 RX ORDER — SERTRALINE HYDROCHLORIDE 100 MG/1
100 TABLET, FILM COATED ORAL NIGHTLY
Qty: 90 TABLET | Refills: 0 | Status: SHIPPED | OUTPATIENT
Start: 2021-09-09 | End: 2021-10-28 | Stop reason: SDUPTHER

## 2021-10-16 ENCOUNTER — PATIENT MESSAGE (OUTPATIENT)
Dept: INTERNAL MEDICINE | Facility: CLINIC | Age: 66
End: 2021-10-16
Payer: COMMERCIAL

## 2021-10-16 DIAGNOSIS — F90.9 ATTENTION DEFICIT HYPERACTIVITY DISORDER (ADHD), UNSPECIFIED ADHD TYPE: Primary | ICD-10-CM

## 2021-10-18 RX ORDER — DEXTROAMPHETAMINE SACCHARATE, AMPHETAMINE ASPARTATE MONOHYDRATE, DEXTROAMPHETAMINE SULFATE AND AMPHETAMINE SULFATE 5; 5; 5; 5 MG/1; MG/1; MG/1; MG/1
CAPSULE, EXTENDED RELEASE ORAL
Qty: 14 CAPSULE | Refills: 0 | Status: CANCELLED | OUTPATIENT
Start: 2021-10-18

## 2021-10-18 RX ORDER — DEXTROAMPHETAMINE SACCHARATE, AMPHETAMINE ASPARTATE MONOHYDRATE, DEXTROAMPHETAMINE SULFATE AND AMPHETAMINE SULFATE 7.5; 7.5; 7.5; 7.5 MG/1; MG/1; MG/1; MG/1
30 CAPSULE, EXTENDED RELEASE ORAL DAILY
Qty: 14 CAPSULE | Refills: 0 | Status: CANCELLED | OUTPATIENT
Start: 2021-10-18

## 2021-10-20 ENCOUNTER — PATIENT MESSAGE (OUTPATIENT)
Dept: INTERNAL MEDICINE | Facility: CLINIC | Age: 66
End: 2021-10-20

## 2021-10-20 RX ORDER — DEXTROAMPHETAMINE SACCHARATE, AMPHETAMINE ASPARTATE MONOHYDRATE, DEXTROAMPHETAMINE SULFATE AND AMPHETAMINE SULFATE 5; 5; 5; 5 MG/1; MG/1; MG/1; MG/1
CAPSULE, EXTENDED RELEASE ORAL
Qty: 14 CAPSULE | Refills: 0 | Status: SHIPPED | OUTPATIENT
Start: 2021-10-20 | End: 2021-10-28 | Stop reason: SDUPTHER

## 2021-10-20 RX ORDER — DEXTROAMPHETAMINE SACCHARATE, AMPHETAMINE ASPARTATE MONOHYDRATE, DEXTROAMPHETAMINE SULFATE AND AMPHETAMINE SULFATE 7.5; 7.5; 7.5; 7.5 MG/1; MG/1; MG/1; MG/1
30 CAPSULE, EXTENDED RELEASE ORAL DAILY
Qty: 14 CAPSULE | Refills: 0 | Status: SHIPPED | OUTPATIENT
Start: 2021-10-20 | End: 2021-10-28 | Stop reason: DRUGHIGH

## 2021-10-28 ENCOUNTER — CLINICAL SUPPORT (OUTPATIENT)
Dept: PSYCHIATRY | Facility: CLINIC | Age: 66
End: 2021-10-28
Payer: COMMERCIAL

## 2021-10-28 ENCOUNTER — PATIENT MESSAGE (OUTPATIENT)
Dept: PSYCHIATRY | Facility: CLINIC | Age: 66
End: 2021-10-28
Payer: COMMERCIAL

## 2021-10-28 ENCOUNTER — OFFICE VISIT (OUTPATIENT)
Dept: PSYCHIATRY | Facility: CLINIC | Age: 66
End: 2021-10-28
Payer: COMMERCIAL

## 2021-10-28 ENCOUNTER — PATIENT MESSAGE (OUTPATIENT)
Dept: INTERNAL MEDICINE | Facility: CLINIC | Age: 66
End: 2021-10-28
Payer: COMMERCIAL

## 2021-10-28 VITALS
HEART RATE: 78 BPM | BODY MASS INDEX: 27.03 KG/M2 | WEIGHT: 210.63 LBS | SYSTOLIC BLOOD PRESSURE: 131 MMHG | HEIGHT: 74 IN | DIASTOLIC BLOOD PRESSURE: 73 MMHG

## 2021-10-28 DIAGNOSIS — F90.0 ATTENTION DEFICIT HYPERACTIVITY DISORDER (ADHD), PREDOMINANTLY INATTENTIVE TYPE: Primary | ICD-10-CM

## 2021-10-28 DIAGNOSIS — F41.1 GAD (GENERALIZED ANXIETY DISORDER): ICD-10-CM

## 2021-10-28 DIAGNOSIS — F90.0 ATTENTION DEFICIT HYPERACTIVITY DISORDER (ADHD), PREDOMINANTLY INATTENTIVE TYPE: ICD-10-CM

## 2021-10-28 LAB
AMPHET+METHAMPHET UR QL: ABNORMAL
BARBITURATES UR QL SCN>200 NG/ML: NEGATIVE
BENZODIAZ UR QL SCN>200 NG/ML: NEGATIVE
BZE UR QL SCN: NEGATIVE
CANNABINOIDS UR QL SCN: NEGATIVE
CREAT UR-MCNC: 156 MG/DL (ref 23–375)
ETHANOL UR-MCNC: <10 MG/DL
METHADONE UR QL SCN>300 NG/ML: NEGATIVE
OPIATES UR QL SCN: NEGATIVE
PCP UR QL SCN>25 NG/ML: NEGATIVE
TOXICOLOGY INFORMATION: ABNORMAL

## 2021-10-28 PROCEDURE — 99999 PR PBB SHADOW E&M-EST. PATIENT-LVL III: CPT | Mod: PBBFAC,,, | Performed by: NURSE PRACTITIONER

## 2021-10-28 PROCEDURE — 90792 PR PSYCHIATRIC DIAGNOSTIC EVALUATION W/MEDICAL SERVICES: ICD-10-PCS | Mod: S$GLB,,, | Performed by: NURSE PRACTITIONER

## 2021-10-28 PROCEDURE — 99999 PR PBB SHADOW E&M-EST. PATIENT-LVL III: ICD-10-PCS | Mod: PBBFAC,,, | Performed by: NURSE PRACTITIONER

## 2021-10-28 PROCEDURE — 90792 PSYCH DIAG EVAL W/MED SRVCS: CPT | Mod: S$GLB,,, | Performed by: NURSE PRACTITIONER

## 2021-10-28 PROCEDURE — 80307 DRUG TEST PRSMV CHEM ANLYZR: CPT | Performed by: NURSE PRACTITIONER

## 2021-10-28 RX ORDER — DEXTROAMPHETAMINE SACCHARATE, AMPHETAMINE ASPARTATE MONOHYDRATE, DEXTROAMPHETAMINE SULFATE AND AMPHETAMINE SULFATE 5; 5; 5; 5 MG/1; MG/1; MG/1; MG/1
CAPSULE, EXTENDED RELEASE ORAL
Qty: 180 CAPSULE | Refills: 0 | Status: SHIPPED | OUTPATIENT
Start: 2021-10-28 | End: 2021-10-28 | Stop reason: SDUPTHER

## 2021-10-28 RX ORDER — DEXTROAMPHETAMINE SACCHARATE, AMPHETAMINE ASPARTATE MONOHYDRATE, DEXTROAMPHETAMINE SULFATE AND AMPHETAMINE SULFATE 5; 5; 5; 5 MG/1; MG/1; MG/1; MG/1
CAPSULE, EXTENDED RELEASE ORAL
Qty: 28 CAPSULE | Refills: 0 | Status: SHIPPED | OUTPATIENT
Start: 2021-10-28 | End: 2021-11-29 | Stop reason: SDUPTHER

## 2021-10-28 RX ORDER — SERTRALINE HYDROCHLORIDE 100 MG/1
100 TABLET, FILM COATED ORAL NIGHTLY
Qty: 90 TABLET | Refills: 0 | Status: SHIPPED | OUTPATIENT
Start: 2021-10-28

## 2021-11-14 ENCOUNTER — PATIENT MESSAGE (OUTPATIENT)
Dept: PSYCHIATRY | Facility: CLINIC | Age: 66
End: 2021-11-14
Payer: COMMERCIAL

## 2021-11-16 ENCOUNTER — PATIENT MESSAGE (OUTPATIENT)
Dept: PSYCHIATRY | Facility: CLINIC | Age: 66
End: 2021-11-16
Payer: COMMERCIAL

## 2021-11-28 ENCOUNTER — PATIENT MESSAGE (OUTPATIENT)
Dept: PSYCHIATRY | Facility: CLINIC | Age: 66
End: 2021-11-28
Payer: COMMERCIAL

## 2021-11-29 ENCOUNTER — PATIENT MESSAGE (OUTPATIENT)
Dept: PSYCHIATRY | Facility: CLINIC | Age: 66
End: 2021-11-29
Payer: COMMERCIAL

## 2021-11-29 DIAGNOSIS — F90.0 ATTENTION DEFICIT HYPERACTIVITY DISORDER (ADHD), PREDOMINANTLY INATTENTIVE TYPE: ICD-10-CM

## 2021-11-29 RX ORDER — DEXTROAMPHETAMINE SACCHARATE, AMPHETAMINE ASPARTATE MONOHYDRATE, DEXTROAMPHETAMINE SULFATE AND AMPHETAMINE SULFATE 5; 5; 5; 5 MG/1; MG/1; MG/1; MG/1
CAPSULE, EXTENDED RELEASE ORAL
Qty: 60 CAPSULE | Refills: 0 | Status: SHIPPED | OUTPATIENT
Start: 2021-11-29 | End: 2022-01-06

## 2022-01-06 ENCOUNTER — OFFICE VISIT (OUTPATIENT)
Dept: INTERNAL MEDICINE | Facility: CLINIC | Age: 67
End: 2022-01-06
Attending: INTERNAL MEDICINE
Payer: COMMERCIAL

## 2022-01-06 VITALS
BODY MASS INDEX: 26.97 KG/M2 | HEART RATE: 71 BPM | OXYGEN SATURATION: 99 % | SYSTOLIC BLOOD PRESSURE: 115 MMHG | WEIGHT: 210.13 LBS | DIASTOLIC BLOOD PRESSURE: 84 MMHG | HEIGHT: 74 IN

## 2022-01-06 DIAGNOSIS — Z79.899 LONG-TERM CURRENT USE OF STIMULANT: ICD-10-CM

## 2022-01-06 DIAGNOSIS — Z85.46 PERSONAL HISTORY OF PROSTATE CANCER: ICD-10-CM

## 2022-01-06 DIAGNOSIS — Z00.00 ANNUAL PHYSICAL EXAM: Primary | ICD-10-CM

## 2022-01-06 DIAGNOSIS — F90.2 ATTENTION DEFICIT HYPERACTIVITY DISORDER (ADHD), COMBINED TYPE: ICD-10-CM

## 2022-01-06 DIAGNOSIS — Z12.11 SCREEN FOR COLON CANCER: ICD-10-CM

## 2022-01-06 DIAGNOSIS — F41.1 GAD (GENERALIZED ANXIETY DISORDER): ICD-10-CM

## 2022-01-06 PROCEDURE — 99999 PR PBB SHADOW E&M-EST. PATIENT-LVL III: ICD-10-PCS | Mod: PBBFAC,,, | Performed by: INTERNAL MEDICINE

## 2022-01-06 PROCEDURE — 99396 PREV VISIT EST AGE 40-64: CPT | Mod: S$GLB,,, | Performed by: INTERNAL MEDICINE

## 2022-01-06 PROCEDURE — 99999 PR PBB SHADOW E&M-EST. PATIENT-LVL III: CPT | Mod: PBBFAC,,, | Performed by: INTERNAL MEDICINE

## 2022-01-06 PROCEDURE — 99396 PR PREVENTIVE VISIT,EST,40-64: ICD-10-PCS | Mod: S$GLB,,, | Performed by: INTERNAL MEDICINE

## 2022-01-06 RX ORDER — DEXTROAMPHETAMINE SACCHARATE, AMPHETAMINE ASPARTATE MONOHYDRATE, DEXTROAMPHETAMINE SULFATE AND AMPHETAMINE SULFATE 5; 5; 5; 5 MG/1; MG/1; MG/1; MG/1
20 CAPSULE, EXTENDED RELEASE ORAL EVERY MORNING
COMMUNITY
Start: 2022-01-06

## 2022-01-06 RX ORDER — DEXTROAMPHETAMINE SACCHARATE, AMPHETAMINE ASPARTATE MONOHYDRATE, DEXTROAMPHETAMINE SULFATE AND AMPHETAMINE SULFATE 7.5; 7.5; 7.5; 7.5 MG/1; MG/1; MG/1; MG/1
30 CAPSULE, EXTENDED RELEASE ORAL EVERY MORNING
COMMUNITY

## 2022-01-06 NOTE — PROGRESS NOTES
Subjective:       Patient ID: Lewis Fish is a 66 y.o. male.    Chief Complaint: Annual Exam     Lewis Fish is a 66 y.o.  male who presents for Annual Exam  .  Problem Noted   Varicose Veins of Both Legs With Edema 10/12/2020   Dipak (Generalized Anxiety Disorder) 2/2/2016    Followed by dr aurelia encarnacion [facility] for counseling and medication management     Adhd (Attention Deficit Hyperactivity Disorder) 1/16/2014    Followed by Dr Dwight Rincon [address] [facility] [phone]        Lewis Fish is a 66 y.o.  male who presents for No chief complaint on file.  .  The ASCVD Risk score (Genetmeagan RENAE Jr., et al., 2013) failed to calculate for the following reasons:    Cannot find a previous HDL lab    Cannot find a previous total cholesterol lab      Health Maintenance       Date Due Completion Date    TETANUS VACCINE Never done ---    Shingles Vaccine (1 of 2) Never done ---    PROSTATE-SPECIFIC ANTIGEN 01/17/2020 1/17/2019    Colorectal Cancer Screening 07/12/2021 7/12/2011 (Done)    Override on 7/12/2011: Done    Lipid Panel 04/07/2022 4/7/2017          Review of Systems   Constitutional: Negative for chills and fever.   HENT: Negative for rhinorrhea and sore throat.    Respiratory: Negative for cough and shortness of breath.    Cardiovascular: Negative for chest pain and palpitations.   Gastrointestinal: Negative for nausea and vomiting.   Genitourinary: Negative for dysuria and hematuria.   Musculoskeletal: Negative for arthralgias and back pain.   Skin: Negative for color change and rash.   Neurological: Negative for weakness and numbness.   Psychiatric/Behavioral: Negative for agitation and dysphoric mood.         Past Medical History:   Diagnosis Date    ADHD (attention deficit hyperactivity disorder)     Allergy seasonal    Anxiety     Depression     Family history of early CAD     brother with CAD age 49    History of psychiatric care     Prostate cancer prostate    bradytherapy    Psychiatric  "problem     Therapy        Past Surgical History:   Procedure Laterality Date    ANKLE LIGAMENT RECONSTRUCTION  2014    right    HERNIA REPAIR  1996    inguinal bilateral    PROSTATE SURGERY  2008    brachytherapy    testicle removed      complication of hernia repair    TUMOR REMOVAL  1996    nail bed       Family History   Problem Relation Age of Onset    Arthritis Mother     Skin cancer Mother     Asthma Brother     Alcohol abuse Maternal Grandfather     Alcohol abuse Paternal Grandmother     Skin cancer Maternal Grandmother     Macular degeneration Maternal Grandmother     Coronary artery disease Brother 49    Drug abuse Other     Glaucoma Neg Hx     Strabismus Neg Hx     Stroke Neg Hx     Blindness Neg Hx        Social History     Tobacco Use    Smoking status: Never Smoker    Smokeless tobacco: Never Used   Substance Use Topics    Alcohol use: Yes     Alcohol/week: 1.0 standard drink     Types: 1 Cans of beer per week     Comment: less than one a week    Drug use: Never             Objective:   Blood pressure 115/84, pulse 71, height 6' 2" (1.88 m), weight 95.3 kg (210 lb 1.6 oz), SpO2 99 %.     Physical Exam  Constitutional:       General: He is not in acute distress.     Appearance: He is well-developed.   HENT:      Head: Normocephalic and atraumatic.      Right Ear: Tympanic membrane and external ear normal.      Left Ear: Tympanic membrane and external ear normal.      Mouth/Throat:      Pharynx: No oropharyngeal exudate.   Eyes:      General:         Right eye: No discharge.         Left eye: No discharge.      Conjunctiva/sclera: Conjunctivae normal.   Neck:      Thyroid: No thyromegaly.   Cardiovascular:      Rate and Rhythm: Regular rhythm.      Heart sounds: Normal heart sounds. No murmur heard.  No friction rub. No gallop.    Pulmonary:      Effort: Pulmonary effort is normal.      Breath sounds: Normal breath sounds. No wheezing or rales.   Abdominal:      General: Bowel " sounds are normal. There is no distension.      Palpations: Abdomen is soft.      Tenderness: There is no abdominal tenderness.   Musculoskeletal:         General: No tenderness.   Lymphadenopathy:      Cervical: No cervical adenopathy.   Skin:     General: Skin is warm and dry.   Neurological:      Mental Status: He is alert and oriented to person, place, and time.   Psychiatric:         Thought Content: Thought content normal.         Prior labs reviewed  Assessment/Plan:        Lewis was seen today for annual exam.    Diagnoses and all orders for this visit:    Annual physical exam  -     Comprehensive Metabolic Panel; Future  rtc in one year with FLP,CMP  Recommend daily sunscreen, cardiovascular exercise min 30 min 5 days per week. Seatbelts routinely.      Attention deficit hyperactivity disorder (ADHD), combined type  CHEO (generalized anxiety disorder)  Both well controlled  Followed by psychiatry     Personal history of prostate cancer  -     PROSTATE SPECIFIC ANTIGEN, DIAGNOSTIC; Future    Screen for colon cancer  -     Case Request Endoscopy: COLONOSCOPY    Long-term current use of stimulant  -     EKG 12-lead; Future        Medication List with Changes/Refills   Current Medications    CETIRIZINE (ZYRTEC) 10 MG TABLET    Take 10 mg by mouth once daily.    DEXTROAMPHETAMINE-AMPHETAMINE (ADDERALL XR) 20 MG 24 HR CAPSULE    Take 20 mg by mouth every morning.    DEXTROAMPHETAMINE-AMPHETAMINE (ADDERALL XR) 30 MG 24 HR CAPSULE    Take 30 mg by mouth every morning.    SERTRALINE (ZOLOFT) 100 MG TABLET    Take 1 tablet (100 mg total) by mouth every evening.    TADALAFIL (CIALIS) 20 MG TAB    Take 1 tablet (20 mg total) by mouth daily as needed.   Discontinued Medications    DEXTROAMPHETAMINE-AMPHETAMINE (ADDERALL XR) 20 MG 24 HR CAPSULE    Take 2 capsules po q am

## 2022-01-07 ENCOUNTER — HOSPITAL ENCOUNTER (OUTPATIENT)
Dept: CARDIOLOGY | Facility: OTHER | Age: 67
Discharge: HOME | End: 2022-01-07
Attending: INTERNAL MEDICINE
Payer: COMMERCIAL

## 2022-01-07 DIAGNOSIS — Z79.899 LONG-TERM CURRENT USE OF STIMULANT: ICD-10-CM

## 2022-01-07 PROCEDURE — 93010 EKG 12-LEAD: ICD-10-PCS | Mod: ,,, | Performed by: INTERNAL MEDICINE

## 2022-01-07 PROCEDURE — 93010 ELECTROCARDIOGRAM REPORT: CPT | Mod: ,,, | Performed by: INTERNAL MEDICINE

## 2022-01-07 PROCEDURE — 93005 ELECTROCARDIOGRAM TRACING: CPT

## 2022-01-11 ENCOUNTER — TELEPHONE (OUTPATIENT)
Dept: INTERNAL MEDICINE | Facility: CLINIC | Age: 67
End: 2022-01-11
Payer: COMMERCIAL

## 2022-01-11 NOTE — TELEPHONE ENCOUNTER
----- Message from Arlene Finch MD sent at 1/11/2022  1:47 PM CST -----   Please let pt know his ekg was fine

## 2022-01-18 ENCOUNTER — PATIENT MESSAGE (OUTPATIENT)
Dept: INTERNAL MEDICINE | Facility: CLINIC | Age: 67
End: 2022-01-18
Payer: COMMERCIAL

## 2022-03-24 DIAGNOSIS — Z12.11 SCREENING FOR COLON CANCER: Primary | ICD-10-CM

## 2022-03-24 RX ORDER — POLYETHYLENE GLYCOL 3350, SODIUM SULFATE ANHYDROUS, SODIUM BICARBONATE, SODIUM CHLORIDE, POTASSIUM CHLORIDE 236; 22.74; 6.74; 5.86; 2.97 G/4L; G/4L; G/4L; G/4L; G/4L
4 POWDER, FOR SOLUTION ORAL ONCE
Qty: 4000 ML | Refills: 0 | Status: SHIPPED | OUTPATIENT
Start: 2022-03-24 | End: 2022-03-24

## 2022-05-12 ENCOUNTER — ANESTHESIA (OUTPATIENT)
Dept: ENDOSCOPY | Facility: HOSPITAL | Age: 67
End: 2022-05-12
Payer: COMMERCIAL

## 2022-05-12 ENCOUNTER — ANESTHESIA EVENT (OUTPATIENT)
Dept: ENDOSCOPY | Facility: HOSPITAL | Age: 67
End: 2022-05-12
Payer: COMMERCIAL

## 2022-05-12 ENCOUNTER — HOSPITAL ENCOUNTER (OUTPATIENT)
Facility: HOSPITAL | Age: 67
Discharge: HOME | End: 2022-05-12
Attending: INTERNAL MEDICINE | Admitting: INTERNAL MEDICINE
Payer: COMMERCIAL

## 2022-05-12 VITALS
HEART RATE: 54 BPM | WEIGHT: 202 LBS | DIASTOLIC BLOOD PRESSURE: 67 MMHG | TEMPERATURE: 98 F | SYSTOLIC BLOOD PRESSURE: 121 MMHG | RESPIRATION RATE: 14 BRPM | HEIGHT: 74 IN | BODY MASS INDEX: 25.93 KG/M2 | OXYGEN SATURATION: 100 %

## 2022-05-12 DIAGNOSIS — K63.5 POLYP OF COLON, UNSPECIFIED PART OF COLON, UNSPECIFIED TYPE: Primary | ICD-10-CM

## 2022-05-12 DIAGNOSIS — Z86.010 HISTORY OF COLON POLYPS: ICD-10-CM

## 2022-05-12 PROCEDURE — 63600175 PHARM REV CODE 636 W HCPCS: Performed by: NURSE ANESTHETIST, CERTIFIED REGISTERED

## 2022-05-12 PROCEDURE — E9220 PRA ENDO ANESTHESIA: ICD-10-PCS | Mod: 33,,, | Performed by: NURSE ANESTHETIST, CERTIFIED REGISTERED

## 2022-05-12 PROCEDURE — 88305 TISSUE EXAM BY PATHOLOGIST: CPT | Performed by: PATHOLOGY

## 2022-05-12 PROCEDURE — 45385 COLONOSCOPY W/LESION REMOVAL: CPT | Mod: PT | Performed by: INTERNAL MEDICINE

## 2022-05-12 PROCEDURE — 88305 TISSUE EXAM BY PATHOLOGIST: CPT | Mod: 26,,, | Performed by: PATHOLOGY

## 2022-05-12 PROCEDURE — E9220 PRA ENDO ANESTHESIA: HCPCS | Mod: 33,,, | Performed by: NURSE ANESTHETIST, CERTIFIED REGISTERED

## 2022-05-12 PROCEDURE — 25000003 PHARM REV CODE 250: Performed by: NURSE ANESTHETIST, CERTIFIED REGISTERED

## 2022-05-12 PROCEDURE — 45385 COLONOSCOPY W/LESION REMOVAL: CPT | Mod: 33,,, | Performed by: INTERNAL MEDICINE

## 2022-05-12 PROCEDURE — 37000009 HC ANESTHESIA EA ADD 15 MINS: Performed by: INTERNAL MEDICINE

## 2022-05-12 PROCEDURE — 88305 TISSUE EXAM BY PATHOLOGIST: ICD-10-PCS | Mod: 26,,, | Performed by: PATHOLOGY

## 2022-05-12 PROCEDURE — 45385 PR COLONOSCOPY,REMV LESN,SNARE: ICD-10-PCS | Mod: 33,,, | Performed by: INTERNAL MEDICINE

## 2022-05-12 PROCEDURE — 37000008 HC ANESTHESIA 1ST 15 MINUTES: Performed by: INTERNAL MEDICINE

## 2022-05-12 RX ORDER — LIDOCAINE HCL/PF 100 MG/5ML
SYRINGE (ML) INTRAVENOUS
Status: DISCONTINUED | OUTPATIENT
Start: 2022-05-12 | End: 2022-05-12

## 2022-05-12 RX ORDER — PROPOFOL 10 MG/ML
VIAL (ML) INTRAVENOUS
Status: DISCONTINUED | OUTPATIENT
Start: 2022-05-12 | End: 2022-05-12

## 2022-05-12 RX ORDER — SODIUM CHLORIDE 9 MG/ML
INJECTION, SOLUTION INTRAVENOUS CONTINUOUS PRN
Status: DISCONTINUED | OUTPATIENT
Start: 2022-05-12 | End: 2022-05-12

## 2022-05-12 RX ORDER — SODIUM CHLORIDE 9 MG/ML
INJECTION, SOLUTION INTRAVENOUS CONTINUOUS
Status: DISCONTINUED | OUTPATIENT
Start: 2022-05-12 | End: 2022-05-12 | Stop reason: HOSPADM

## 2022-05-12 RX ORDER — PROPOFOL 10 MG/ML
VIAL (ML) INTRAVENOUS CONTINUOUS PRN
Status: DISCONTINUED | OUTPATIENT
Start: 2022-05-12 | End: 2022-05-12

## 2022-05-12 RX ADMIN — SODIUM CHLORIDE: 0.9 INJECTION, SOLUTION INTRAVENOUS at 06:05

## 2022-05-12 RX ADMIN — PROPOFOL 150 MCG/KG/MIN: 10 INJECTION, EMULSION INTRAVENOUS at 07:05

## 2022-05-12 RX ADMIN — Medication 100 MG: at 07:05

## 2022-05-12 RX ADMIN — PROPOFOL 100 MG: 10 INJECTION, EMULSION INTRAVENOUS at 07:05

## 2022-05-12 NOTE — ANESTHESIA POSTPROCEDURE EVALUATION
Anesthesia Post Evaluation    Patient: Lewis Fish    Procedure(s) Performed: Procedure(s) (LRB):  COLONOSCOPY (N/A)    Final Anesthesia Type: general      Patient location during evaluation: PACU  Patient participation: Yes- Able to Participate  Level of consciousness: awake and alert  Post-procedure vital signs: reviewed and stable  Pain management: adequate  Airway patency: patent    PONV status at discharge: No PONV  Anesthetic complications: no      Cardiovascular status: blood pressure returned to baseline  Respiratory status: unassisted  Hydration status: euvolemic  Follow-up not needed.          Vitals Value Taken Time   /67 05/12/22 0803   Temp 36.6 °C (97.9 °F) 05/12/22 0732   Pulse 54 05/12/22 0803   Resp 14 05/12/22 0803   SpO2 100 % 05/12/22 0803         Event Time   Out of Recovery 08:05:25         Pain/Nany Score: Nany Score: 10 (5/12/2022  7:35 AM)

## 2022-05-12 NOTE — PROVATION PATIENT INSTRUCTIONS
Discharge Summary/Instructions after an Endoscopic Procedure  Patient Name: Lewis Fish  Patient MRN: [id number]  Patient YOB: 1955  Thursday, May 12, 2022  Sid Ko MD  Dear patient,  As a result of recent federal legislation (The Federal Cures Act), you may   receive lab or pathology results from your procedure in your [facility]   account before your physician is able to contact you. Your physician or   their representative will relay the results to you with their   recommendations at their soonest availability.  Thank you,  RESTRICTIONS:  During your procedure today, you received medications for sedation.  These   medications may affect your judgment, balance and coordination.  Therefore,   for 24 hours, you have the following restrictions:   - DO NOT drive a car, operate machinery, make legal/financial decisions,   sign important papers or drink alcohol.    ACTIVITY:  Today: no heavy lifting, straining or running due to procedural   sedation/anesthesia.  The following day: return to full activity including work.  DIET:  Eat and drink normally unless instructed otherwise.     TREATMENT FOR COMMON SIDE EFFECTS:  - Mild abdominal pain, nausea, belching, bloating or excessive gas:  rest,   eat lightly and use a heating pad.  - Sore Throat: treat with throat lozenges and/or gargle with warm salt   water.  - Because air was used during the procedure, expelling large amounts of air   from your rectum or belching is normal.  - If a bowel prep was taken, you may not have a bowel movement for 1-3 days.    This is normal.  SYMPTOMS TO WATCH FOR AND REPORT TO YOUR PHYSICIAN:  1. Abdominal pain or bloating, other than gas cramps.  2. Chest pain.  3. Back pain.  4. Signs of infection such as: chills or fever occurring within 24 hours   after the procedure.  5. Rectal bleeding, which would show as bright red, maroon, or black stools.   (A tablespoon of blood from the rectum is not serious, especially if    hemorrhoids are present.)  6. Vomiting.  7. Weakness or dizziness.  GO DIRECTLY TO THE NEAREST EMERGENCY ROOM IF YOU HAVE ANY OF THE FOLLOWING:      Difficulty breathing              Chills and/or fever over 101 F   Persistent vomiting and/or vomiting blood   Severe abdominal pain   Severe chest pain   Black, tarry stools   Bleeding- more than one tablespoon   Any other symptom or condition that you feel may need urgent attention  Your doctor recommends these additional instructions:  If any biopsies were taken, your doctors clinic will contact you in 1 to 2   weeks with any results.  - Patient has a contact number available for emergencies.  The signs and   symptoms of potential delayed complications were discussed with the   patient.  Return to normal activities tomorrow.  Written discharge   instructions were provided to the patient.   - Resume previous diet.   - Continue present medications.   - Await pathology results.   - Repeat colonoscopy in 7 years for surveillance.   - Discharge patient to home.  For questions, problems or results please call your physician - Sid Ko MD at Work:  ( [phone].  [facility] [place], EMERGENCY ROOM PHONE NUMBER: [phone]  IF A COMPLICATION OR EMERGENCY SITUATION ARISES AND YOU ARE UNABLE TO REACH   YOUR PHYSICIAN - GO DIRECTLY TO THE EMERGENCY ROOM.  Sid Ko MD  5/12/2022 7:30:43 AM  This report has been verified and signed electronically.  Dear patient,  As a result of recent federal legislation (The Federal Cures Act), you may   receive lab or pathology results from your procedure in your [facility]   account before your physician is able to contact you. Your physician or   their representative will relay the results to you with their   recommendations at their soonest availability.  Thank you,  PROVATION

## 2022-05-12 NOTE — ANESTHESIA PREPROCEDURE EVALUATION
05/12/2022  Lewis Fish is a 66 y.o., male.  Past Medical History:   Diagnosis Date    ADHD (attention deficit hyperactivity disorder)     Allergy seasonal    Anxiety     Depression     Family history of early CAD     brother with CAD age 49    History of psychiatric care     Prostate cancer prostate    bradytherapy    Psychiatric problem     Therapy      Past Surgical History:   Procedure Laterality Date    ANKLE LIGAMENT RECONSTRUCTION  2014    right    HERNIA REPAIR  1996    inguinal bilateral    PROSTATE SURGERY  2008    brachytherapy    testicle removed      complication of hernia repair    TUMOR REMOVAL  1996    nail bed           Pre-op Assessment    I have reviewed the Patient Summary Reports.       I have reviewed the Medications.     Review of Systems  Anesthesia Hx:  No problems with previous Anesthesia Denies Hx of Anesthetic complications  Denies Family Hx of Anesthesia complications.   Denies Personal Hx of Anesthesia complications.   Hematology/Oncology:  Hematology Normal   Oncology Normal     EENT/Dental:EENT/Dental Normal   Cardiovascular:  Cardiovascular Normal     Pulmonary:  Pulmonary Normal    Renal/:  Renal/ Normal     Hepatic/GI:  Hepatic/GI Normal    Musculoskeletal:  Musculoskeletal Normal    Neurological:  Neurology Normal    Endocrine:  Endocrine Normal    Dermatological:  Skin Normal    Psych:  Psychiatric Normal           Physical Exam  General: Well nourished    Airway:  Mallampati: II / II  Mouth Opening: Normal  Tongue: Normal  Neck ROM: Normal ROM    Dental:  Intact        Anesthesia Plan  Type of Anesthesia, risks & benefits discussed:    Anesthesia Type: Gen Natural Airway  Intra-op Monitoring Plan: Standard ASA Monitors  Induction:  IV  Informed Consent: Informed consent signed with the Patient and all parties understand the risks and agree with  anesthesia plan.  All questions answered.   ASA Score: 2  Day of Surgery Review of History & Physical: H&P Update referred to the surgeon/provider.    Ready For Surgery From Anesthesia Perspective.     .

## 2022-05-12 NOTE — H&P
Short Stay Endoscopy   Pre-Procedure Note    PCP - Arlene Finch MD  Referring Physician - Arlene Finch MD  [address]  [place] [address]    Procedure - Endoscopy    ASA - per anesthesia  Mallampati - per anesthesia    HPI  66 y.o. male scheduled for endoscopy for evaluation of    1. Polyp of colon, unspecified part of colon, unspecified type    2. History of colon polyps         Medical History:  has a past medical history of ADHD (attention deficit hyperactivity disorder), Allergy (seasonal), Anxiety, Depression, Family history of early CAD, History of psychiatric care, Prostate cancer (prostate), Psychiatric problem, and Therapy.    Surgical History:  has a past surgical history that includes Hernia repair (1996); Tumor removal (1996); testicle removed; Ankle ligament reconstruction (2014); and Prostate surgery (2008).    Family History: family history includes Alcohol abuse in his maternal grandfather and paternal grandmother; Arthritis in his mother; Asthma in his brother; Coronary artery disease (age of onset: 49) in his brother; Drug abuse in his other; Macular degeneration in his maternal grandmother; Skin cancer in his maternal grandmother and mother..    Social History:  reports that he has never smoked. He has never used smokeless tobacco. He reports current alcohol use of about 1.0 standard drink of alcohol per week. He reports that he does not use drugs.    Review of patient's allergies indicates:  No Known Allergies    Medications:   Medications Prior to Admission   Medication Sig Dispense Refill Last Dose    dextroamphetamine-amphetamine (ADDERALL XR) 20 MG 24 hr capsule Take 20 mg by mouth every morning.   5/11/2022 at Unknown time    dextroamphetamine-amphetamine (ADDERALL XR) 30 MG 24 hr capsule Take 30 mg by mouth every morning.   5/11/2022 at Unknown time    sertraline (ZOLOFT) 100 MG tablet Take 1 tablet (100 mg total) by mouth every evening. 90 tablet 0 5/11/2022  at Unknown time    cetirizine (ZYRTEC) 10 MG tablet Take 10 mg by mouth once daily.   Unknown at Unknown time    tadalafil (CIALIS) 20 MG Tab Take 1 tablet (20 mg total) by mouth daily as needed. 10 tablet 11          Labs:  Lab Results   Component Value Date    WBC 4.21 01/17/2014    HGB 15.2 01/17/2014    HCT 45.5 01/17/2014     01/17/2014    CHOL 171 04/07/2017    TRIG 148 04/07/2017    HDL 35 (L) 04/07/2017    ALT 22 01/07/2022    AST 14 01/07/2022     01/07/2022    K 4.4 01/07/2022     01/07/2022    CREATININE 1.4 01/07/2022    BUN 23 01/07/2022    CO2 27 01/07/2022    TSH 1.459 10/12/2020    PSA 0.02 04/07/2017    GLUF 100 06/13/2014    HGBA1C 5.5 10/12/2020       Risks and benefits of this endoscopic procedure, including but not limited to bleeding, inflammation, infection, perforation, and death, explained to the patient prior to procedure      Sid Ko MD

## 2022-05-12 NOTE — TRANSFER OF CARE
"Anesthesia Transfer of Care Note    Patient: Lewis Fish    Procedure(s) Performed: Procedure(s) (LRB):  COLONOSCOPY (N/A)    Patient location: GI    Anesthesia Type: general    Transport from OR: Transported from OR on room air with adequate spontaneous ventilation    Post pain: adequate analgesia    Post assessment: no apparent anesthetic complications and tolerated procedure well    Post vital signs: stable    Level of consciousness: awake, alert and oriented    Nausea/Vomiting: no nausea/vomiting    Complications: none    Transfer of care protocol was followed      Last vitals:   Visit Vitals  BP (!) 152/81 (BP Location: Left arm, Patient Position: Lying)   Pulse 65   Temp 36.7 °C (98.1 °F) (Temporal)   Resp 16   Ht 6' 2" (1.88 m)   Wt 91.6 kg (202 lb)   SpO2 100%   BMI 25.94 kg/m²     "

## 2022-05-18 LAB
FINAL PATHOLOGIC DIAGNOSIS: NORMAL
GROSS: NORMAL
Lab: NORMAL

## 2023-02-14 ENCOUNTER — ANESTHESIA EVENT (OUTPATIENT)
Dept: SURGERY | Facility: OTHER | Age: 68
End: 2023-02-14
Payer: COMMERCIAL

## 2023-02-14 ENCOUNTER — HOSPITAL ENCOUNTER (OUTPATIENT)
Dept: PREADMISSION TESTING | Facility: OTHER | Age: 68
Discharge: HOME | End: 2023-02-14
Attending: ORTHOPAEDIC SURGERY
Payer: COMMERCIAL

## 2023-02-14 VITALS
BODY MASS INDEX: 25.67 KG/M2 | SYSTOLIC BLOOD PRESSURE: 132 MMHG | DIASTOLIC BLOOD PRESSURE: 71 MMHG | HEART RATE: 88 BPM | TEMPERATURE: 98 F | WEIGHT: 200 LBS | RESPIRATION RATE: 16 BRPM | OXYGEN SATURATION: 99 % | HEIGHT: 74 IN

## 2023-02-14 DIAGNOSIS — M75.102 ROTATOR CUFF SYNDROME OF LEFT SHOULDER: ICD-10-CM

## 2023-02-14 DIAGNOSIS — Z01.818 PREOP TESTING: Primary | ICD-10-CM

## 2023-02-14 LAB
ANION GAP SERPL CALC-SCNC: 6 MMOL/L (ref 8–16)
BASOPHILS # BLD AUTO: 0.02 K/UL (ref 0–0.2)
BASOPHILS NFR BLD: 0.4 % (ref 0–1.9)
BUN SERPL-MCNC: 21 MG/DL (ref 8–23)
CALCIUM SERPL-MCNC: 9.3 MG/DL (ref 8.7–10.5)
CHLORIDE SERPL-SCNC: 109 MMOL/L (ref 95–110)
CO2 SERPL-SCNC: 26 MMOL/L (ref 23–29)
CREAT SERPL-MCNC: 1.3 MG/DL (ref 0.5–1.4)
DIFFERENTIAL METHOD: ABNORMAL
EOSINOPHIL # BLD AUTO: 0.1 K/UL (ref 0–0.5)
EOSINOPHIL NFR BLD: 2 % (ref 0–8)
ERYTHROCYTE [DISTWIDTH] IN BLOOD BY AUTOMATED COUNT: 13.6 % (ref 11.5–14.5)
EST. GFR  (NO RACE VARIABLE): >60 ML/MIN/1.73 M^2
GLUCOSE SERPL-MCNC: 132 MG/DL (ref 70–110)
HCT VFR BLD AUTO: 42.9 % (ref 40–54)
HGB BLD-MCNC: 15 G/DL (ref 14–18)
IMM GRANULOCYTES # BLD AUTO: 0.01 K/UL (ref 0–0.04)
IMM GRANULOCYTES NFR BLD AUTO: 0.2 % (ref 0–0.5)
LYMPHOCYTES # BLD AUTO: 1.3 K/UL (ref 1–4.8)
LYMPHOCYTES NFR BLD: 25.7 % (ref 18–48)
MCH RBC QN AUTO: 29.7 PG (ref 27–31)
MCHC RBC AUTO-ENTMCNC: 35 G/DL (ref 32–36)
MCV RBC AUTO: 85 FL (ref 82–98)
MONOCYTES # BLD AUTO: 0.3 K/UL (ref 0.3–1)
MONOCYTES NFR BLD: 6.1 % (ref 4–15)
NEUTROPHILS # BLD AUTO: 3.3 K/UL (ref 1.8–7.7)
NEUTROPHILS NFR BLD: 65.6 % (ref 38–73)
NRBC BLD-RTO: 0 /100 WBC
PLATELET # BLD AUTO: 135 K/UL (ref 150–450)
PMV BLD AUTO: 8.4 FL (ref 9.2–12.9)
POTASSIUM SERPL-SCNC: 4.4 MMOL/L (ref 3.5–5.1)
RBC # BLD AUTO: 5.05 M/UL (ref 4.6–6.2)
SODIUM SERPL-SCNC: 141 MMOL/L (ref 136–145)
WBC # BLD AUTO: 5.06 K/UL (ref 3.9–12.7)

## 2023-02-14 PROCEDURE — 80048 BASIC METABOLIC PNL TOTAL CA: CPT | Performed by: ANESTHESIOLOGY

## 2023-02-14 PROCEDURE — 36415 COLL VENOUS BLD VENIPUNCTURE: CPT | Performed by: ANESTHESIOLOGY

## 2023-02-14 PROCEDURE — 85025 COMPLETE CBC W/AUTO DIFF WBC: CPT | Performed by: ANESTHESIOLOGY

## 2023-02-14 RX ORDER — LIDOCAINE HYDROCHLORIDE 10 MG/ML
1 INJECTION, SOLUTION EPIDURAL; INFILTRATION; INTRACAUDAL; PERINEURAL ONCE
Status: CANCELLED | OUTPATIENT
Start: 2023-02-14 | End: 2023-02-14

## 2023-02-14 RX ORDER — ACETAMINOPHEN 500 MG
1000 TABLET ORAL
Status: CANCELLED | OUTPATIENT
Start: 2023-02-14 | End: 2023-02-14

## 2023-02-14 NOTE — DISCHARGE INSTRUCTIONS
Information to Prepare you for your Surgery    PRE-ADMIT TESTING -  [phone]    [address] [facility]        Your surgery has been scheduled at [facility]. We are pleased to have the opportunity to serve you. For Further Information please call [phone].    On the day of surgery please report to the Information Desk on the 1st floor.    CONTACT YOUR PHYSICIAN'S OFFICE THE DAY PRIOR TO YOUR SURGERY TO OBTAIN YOUR ARRIVAL TIME.     The evening before surgery do not eat anything after 9 p.m. ( this includes hard candy, chewing gum and mints).  You may only have GATORADE, POWERADE AND WATER  from 9 p.m. until you leave your home.   DO NOT DRINK ANY LIQUIDS ON THE WAY TO THE HOSPITAL.      Why does your anesthesiologist allow you to drink Gatorade/Powerade before surgery?  Gatorade/Powerade helps to increase your comfort before surgery and to decrease your nausea after surgery. The carbohydrates in Gatorade/Powerade help reduce your body's stress response to surgery.  If you are a diabetic-drink only water prior to surgery.      Current Visitor policy(12/27/2021) - Patients may have 2 visitors pre and post procedure. Only 2 visitors will be allowed in the Surgical building with the patient. No one under the age of 12 will be allowed into the facility.    SPECIAL MEDICATION INSTRUCTIONS: TAKE medications checked off by the Anesthesiologist on your Medication List.    Surgery Patients:  If you take ASPIRIN - Your PHYSICIAN/SURGEON will need to inform you IF/OR when you need to stop taking aspirin prior to your surgery.     Starting one week prior to surgery, do not take NSAIDS (Advil, Aleve, Anaprox, BC, Celebrex, Diclofenac, Goody's, Ibuprofen, Indocin, Indomethacin, Motrin, Naproxen, Voltaren, etc)   If you are not sure if you should take a medicine please call your surgeon's office.  You may take Tylenol.     Do Not Wear any make-up (especially eye  make-up) to surgery. Please remove any false eyelashes or eyelash extensions. If you arrive the day of surgery with makeup/eyelashes on you will be required to remove prior to surgery. (There is a risk of corneal abrasions if eye makeup/eyelash extensions are not removed)    Leave all valuables at home.   Do not wear any jewelry or watches, including any metal in body piercings. Jewelry must be removed prior to coming to the hospital.  There is a possibility that rings that are unable to be removed may be cut off if they are on the surgical extremity.    Please remove all hair extensions, wigs, clips and any other metal accessories/ ornaments from your hair.  These items may pose a flammable/fire risk in Surgery and must be removed.    Do not shave your surgical area at least 5 days prior to your surgery. The surgical prep will be performed at the hospital according to Infection Control regulations.    Contact Lens must be removed before surgery. Please either do not wear contact lens or bring a case and solution for storage.  Please bring a container for eyeglasses &/or dentures.  Bring any paperwork your physician has provided, such as consent forms, history and physicals, doctor's orders, etc.   Bring comfortable clothes that are loose fitting to wear upon discharge. Take into consideration the type of surgery being performed.  Maintain your diet as advised per your physician the day prior to surgery.    Adequate rest the night before surgery is advised.   Park in the Parking lot behind the [facility] [facility] [employer] [facility] or in the [facility] [facility] across the street from the parking lot. Parking is complimentary.  If you will be discharged the same day as your procedure, please arrange for a responsible adult to drive you home or to accompany you if traveling by taxi.   YOU WILL NOT BE PERMITTED TO DRIVE OR TO LEAVE THE HOSPITAL ALONE AFTER SURGERY.   If you are being discharged the same day, it is strongly recommended  that you arrange for someone to remain with you for the first 24 hrs following your surgery.    The Surgeon will speak to your family/visitor after your surgery regarding the outcome of your surgery and post op care.  The Surgeon may speak to you after your surgery, but there is a possibility you may not remember the details.  Please check with your family members regarding the conversation with the Surgeon.    We strongly recommend whoever is bringing you home be present for discharge instructions.  This will ensure a thorough understanding for your post op home care.    Visitors-Refer to current Visitor policy handouts.    Thank you for your cooperation.  The Staff of [facility].            Bathing Instructions with Hibiclens    Shower the evening before and morning of your procedure with Chlorhexidine (Hibiclens)  Do not use Chlorhexidine on your face or genitals. Do not get in your eyes.  Wash your face with water and your regular face wash/soap  Use your regular shampoo  Apply Chlorhexidine (Hibiclens) directly on your skin or on a wet washcloth and wash gently. When showering: Move away from the shower stream when applying Chlorhexidine (Hibiclens) to avoid rinsing off too soon.  Rinse thoroughly with warm water  Do not dilute Chlorhexidine (Hibiclens)   Dry off as usual. Do not apply deodorant, powder, body lotion, perfume, after shave or cologne the morning of your procedure.

## 2023-02-14 NOTE — ANESTHESIA PREPROCEDURE EVALUATION
02/14/2023  Lewis Fish is a 67 y.o., male.      Pre-op Assessment    I have reviewed the Patient Summary Reports.     I have reviewed the Nursing Notes. I have reviewed the NPO Status.   I have reviewed the Medications.     Review of Systems  Anesthesia Hx:  No previous Anesthesia  History of prior surgery of interest to airway management or planning: Previous anesthesia: General Prostate with general anesthesia.  Denies Family Hx of Anesthesia complications.   Denies Personal Hx of Anesthesia complications.   Social:  Non-Smoker, Social Alcohol Use    Hematology/Oncology:  Hematology Normal       -- Cancer in past history:  surgery  Oncology Comments: Prostate     EENT/Dental:EENT/Dental Normal   Cardiovascular:  Cardiovascular Normal Exercise tolerance: good     Pulmonary:  Pulmonary Normal    Renal/:  Renal/ Normal     Hepatic/GI:  Hepatic/GI Normal    Musculoskeletal:  Musculoskeletal Normal    Neurological:  Neurology Normal    Endocrine:  Endocrine Normal    Dermatological:  Skin Normal    Psych:   Psychiatric History anxiety depression ADHD         Physical Exam  General: Well nourished, Cooperative, Alert and Oriented    Airway:  Mallampati: II   Mouth Opening: Small, but > 3cm  Neck ROM: Normal ROM    Dental:  Caps / Implants, Intact        Anesthesia Plan  Type of Anesthesia, risks & benefits discussed:    Anesthesia Type: Gen ETT  Intra-op Monitoring Plan: Standard ASA Monitors  Post Op Pain Control Plan: multimodal analgesia and peripheral nerve block  Induction:  IV  Airway Plan: Video, Post-Induction  Informed Consent: Informed consent signed with the Patient and all parties understand the risks and agree with anesthesia plan.  All questions answered.   ASA Score: 2  Anesthesia Plan Notes: Block discussed,labs reviewed    Ready For Surgery From Anesthesia Perspective.     .

## 2023-02-17 ENCOUNTER — HOSPITAL ENCOUNTER (OUTPATIENT)
Facility: OTHER | Age: 68
Discharge: HOME | End: 2023-02-17
Attending: ORTHOPAEDIC SURGERY | Admitting: ORTHOPAEDIC SURGERY
Payer: COMMERCIAL

## 2023-02-17 ENCOUNTER — ANESTHESIA (OUTPATIENT)
Dept: SURGERY | Facility: OTHER | Age: 68
End: 2023-02-17
Payer: COMMERCIAL

## 2023-02-17 VITALS
TEMPERATURE: 98 F | HEIGHT: 74 IN | SYSTOLIC BLOOD PRESSURE: 97 MMHG | RESPIRATION RATE: 16 BRPM | WEIGHT: 200 LBS | HEART RATE: 66 BPM | DIASTOLIC BLOOD PRESSURE: 54 MMHG | OXYGEN SATURATION: 97 % | BODY MASS INDEX: 25.67 KG/M2

## 2023-02-17 DIAGNOSIS — M75.102 ROTATOR CUFF SYNDROME OF LEFT SHOULDER: ICD-10-CM

## 2023-02-17 PROCEDURE — 25000003 PHARM REV CODE 250: Performed by: ANESTHESIOLOGY

## 2023-02-17 PROCEDURE — 25000003 PHARM REV CODE 250: Performed by: STUDENT IN AN ORGANIZED HEALTH CARE EDUCATION/TRAINING PROGRAM

## 2023-02-17 PROCEDURE — C1713 ANCHOR/SCREW BN/BN,TIS/BN: HCPCS | Performed by: ORTHOPAEDIC SURGERY

## 2023-02-17 PROCEDURE — 37000009 HC ANESTHESIA EA ADD 15 MINS: Performed by: ORTHOPAEDIC SURGERY

## 2023-02-17 PROCEDURE — 63600175 PHARM REV CODE 636 W HCPCS: Performed by: STUDENT IN AN ORGANIZED HEALTH CARE EDUCATION/TRAINING PROGRAM

## 2023-02-17 PROCEDURE — 27201423 OPTIME MED/SURG SUP & DEVICES STERILE SUPPLY: Performed by: ORTHOPAEDIC SURGERY

## 2023-02-17 PROCEDURE — 36000711: Performed by: ORTHOPAEDIC SURGERY

## 2023-02-17 PROCEDURE — 71000015 HC POSTOP RECOV 1ST HR: Performed by: ORTHOPAEDIC SURGERY

## 2023-02-17 PROCEDURE — 76942 ECHO GUIDE FOR BIOPSY: CPT | Performed by: ANESTHESIOLOGY

## 2023-02-17 PROCEDURE — 63600175 PHARM REV CODE 636 W HCPCS: Performed by: ORTHOPAEDIC SURGERY

## 2023-02-17 PROCEDURE — 63600175 PHARM REV CODE 636 W HCPCS: Performed by: ANESTHESIOLOGY

## 2023-02-17 PROCEDURE — 37000008 HC ANESTHESIA 1ST 15 MINUTES: Performed by: ORTHOPAEDIC SURGERY

## 2023-02-17 PROCEDURE — 63600175 PHARM REV CODE 636 W HCPCS

## 2023-02-17 PROCEDURE — 71000039 HC RECOVERY, EACH ADD'L HOUR: Performed by: ORTHOPAEDIC SURGERY

## 2023-02-17 PROCEDURE — 71000033 HC RECOVERY, INTIAL HOUR: Performed by: ORTHOPAEDIC SURGERY

## 2023-02-17 PROCEDURE — 36000710: Performed by: ORTHOPAEDIC SURGERY

## 2023-02-17 PROCEDURE — 64415 NJX AA&/STRD BRCH PLXS IMG: CPT | Mod: 59 | Performed by: ANESTHESIOLOGY

## 2023-02-17 DEVICE — ANCHOR Y-KNOT PRO RC 2 2.8MM: Type: IMPLANTABLE DEVICE | Site: SHOULDER | Status: FUNCTIONAL

## 2023-02-17 DEVICE — ANCHOR SUTURE POPLOK 4.5MM: Type: IMPLANTABLE DEVICE | Site: SHOULDER | Status: FUNCTIONAL

## 2023-02-17 DEVICE — ANCHOR TENOLOK  6.0MM: Type: IMPLANTABLE DEVICE | Site: SHOULDER | Status: FUNCTIONAL

## 2023-02-17 RX ORDER — EPHEDRINE SULFATE 50 MG/ML
INJECTION, SOLUTION INTRAVENOUS
Status: DISCONTINUED | OUTPATIENT
Start: 2023-02-17 | End: 2023-02-17

## 2023-02-17 RX ORDER — DIPHENHYDRAMINE HYDROCHLORIDE 50 MG/ML
12.5 INJECTION INTRAMUSCULAR; INTRAVENOUS EVERY 30 MIN PRN
Status: DISCONTINUED | OUTPATIENT
Start: 2023-02-17 | End: 2023-02-17 | Stop reason: HOSPADM

## 2023-02-17 RX ORDER — LIDOCAINE HYDROCHLORIDE 10 MG/ML
1 INJECTION, SOLUTION EPIDURAL; INFILTRATION; INTRACAUDAL; PERINEURAL ONCE
Status: DISCONTINUED | OUTPATIENT
Start: 2023-02-17 | End: 2023-02-17 | Stop reason: HOSPADM

## 2023-02-17 RX ORDER — LIDOCAINE HYDROCHLORIDE 20 MG/ML
INJECTION INTRAVENOUS
Status: DISCONTINUED | OUTPATIENT
Start: 2023-02-17 | End: 2023-02-17

## 2023-02-17 RX ORDER — PROCHLORPERAZINE EDISYLATE 5 MG/ML
5 INJECTION INTRAMUSCULAR; INTRAVENOUS EVERY 30 MIN PRN
Status: DISCONTINUED | OUTPATIENT
Start: 2023-02-17 | End: 2023-02-17 | Stop reason: HOSPADM

## 2023-02-17 RX ORDER — FLUTICASONE PROPIONATE 50 MCG
1 SPRAY, SUSPENSION (ML) NASAL DAILY
COMMUNITY

## 2023-02-17 RX ORDER — MIDAZOLAM HYDROCHLORIDE 1 MG/ML
INJECTION INTRAMUSCULAR; INTRAVENOUS
Status: DISCONTINUED | OUTPATIENT
Start: 2023-02-17 | End: 2023-02-17

## 2023-02-17 RX ORDER — FENTANYL CITRATE 50 UG/ML
INJECTION, SOLUTION INTRAMUSCULAR; INTRAVENOUS
Status: DISCONTINUED | OUTPATIENT
Start: 2023-02-17 | End: 2023-02-17

## 2023-02-17 RX ORDER — OXYCODONE HYDROCHLORIDE 5 MG/1
5 TABLET ORAL
Status: DISCONTINUED | OUTPATIENT
Start: 2023-02-17 | End: 2023-02-17 | Stop reason: HOSPADM

## 2023-02-17 RX ORDER — HYDROMORPHONE HYDROCHLORIDE 2 MG/ML
0.4 INJECTION, SOLUTION INTRAMUSCULAR; INTRAVENOUS; SUBCUTANEOUS EVERY 5 MIN PRN
Status: DISCONTINUED | OUTPATIENT
Start: 2023-02-17 | End: 2023-02-17 | Stop reason: HOSPADM

## 2023-02-17 RX ORDER — KETOROLAC TROMETHAMINE 30 MG/ML
30 INJECTION, SOLUTION INTRAMUSCULAR; INTRAVENOUS ONCE
Status: COMPLETED | OUTPATIENT
Start: 2023-02-17 | End: 2023-02-17

## 2023-02-17 RX ORDER — EPINEPHRINE 1 MG/ML
INJECTION INTRAMUSCULAR; INTRAVENOUS; SUBCUTANEOUS
Status: DISCONTINUED | OUTPATIENT
Start: 2023-02-17 | End: 2023-02-17 | Stop reason: HOSPADM

## 2023-02-17 RX ORDER — OXYCODONE AND ACETAMINOPHEN 5; 325 MG/1; MG/1
1 TABLET ORAL EVERY 6 HOURS PRN
Qty: 30 TABLET | Refills: 0 | Status: SHIPPED | OUTPATIENT
Start: 2023-02-17 | End: 2023-03-06 | Stop reason: ALTCHOICE

## 2023-02-17 RX ORDER — ROCURONIUM BROMIDE 10 MG/ML
INJECTION, SOLUTION INTRAVENOUS
Status: DISCONTINUED | OUTPATIENT
Start: 2023-02-17 | End: 2023-02-17

## 2023-02-17 RX ORDER — ONDANSETRON 2 MG/ML
INJECTION INTRAMUSCULAR; INTRAVENOUS
Status: DISCONTINUED | OUTPATIENT
Start: 2023-02-17 | End: 2023-02-17

## 2023-02-17 RX ORDER — ACETAMINOPHEN 500 MG
1000 TABLET ORAL
Status: COMPLETED | OUTPATIENT
Start: 2023-02-17 | End: 2023-02-17

## 2023-02-17 RX ORDER — ROPIVACAINE HYDROCHLORIDE 5 MG/ML
INJECTION, SOLUTION EPIDURAL; INFILTRATION; PERINEURAL
Status: COMPLETED | OUTPATIENT
Start: 2023-02-17 | End: 2023-02-17

## 2023-02-17 RX ORDER — SODIUM CHLORIDE 0.9 % (FLUSH) 0.9 %
3 SYRINGE (ML) INJECTION
Status: DISCONTINUED | OUTPATIENT
Start: 2023-02-17 | End: 2023-02-17 | Stop reason: HOSPADM

## 2023-02-17 RX ORDER — CEFAZOLIN SODIUM 1 G/3ML
2 INJECTION, POWDER, FOR SOLUTION INTRAMUSCULAR; INTRAVENOUS
Status: COMPLETED | OUTPATIENT
Start: 2023-02-17 | End: 2023-02-17

## 2023-02-17 RX ORDER — MEPERIDINE HYDROCHLORIDE 25 MG/ML
12.5 INJECTION INTRAMUSCULAR; INTRAVENOUS; SUBCUTANEOUS ONCE AS NEEDED
Status: DISCONTINUED | OUTPATIENT
Start: 2023-02-17 | End: 2023-02-17 | Stop reason: HOSPADM

## 2023-02-17 RX ORDER — DEXAMETHASONE SODIUM PHOSPHATE 4 MG/ML
INJECTION, SOLUTION INTRA-ARTICULAR; INTRALESIONAL; INTRAMUSCULAR; INTRAVENOUS; SOFT TISSUE
Status: DISCONTINUED | OUTPATIENT
Start: 2023-02-17 | End: 2023-02-17

## 2023-02-17 RX ORDER — PROPOFOL 10 MG/ML
VIAL (ML) INTRAVENOUS
Status: DISCONTINUED | OUTPATIENT
Start: 2023-02-17 | End: 2023-02-17

## 2023-02-17 RX ADMIN — LIDOCAINE HYDROCHLORIDE 60 MG: 20 INJECTION, SOLUTION INTRAVENOUS at 07:02

## 2023-02-17 RX ADMIN — ROCURONIUM BROMIDE 50 MG: 10 INJECTION, SOLUTION INTRAVENOUS at 07:02

## 2023-02-17 RX ADMIN — ROPIVACAINE HYDROCHLORIDE 30 ML: 5 INJECTION, SOLUTION EPIDURAL; INFILTRATION; PERINEURAL at 07:02

## 2023-02-17 RX ADMIN — EPHEDRINE SULFATE 15 MG: 50 INJECTION INTRAVENOUS at 07:02

## 2023-02-17 RX ADMIN — PHENYLEPHRINE HYDROCHLORIDE 0.2 MCG/KG/MIN: 10 INJECTION INTRAVENOUS at 07:02

## 2023-02-17 RX ADMIN — MIDAZOLAM HYDROCHLORIDE 1 MG: 1 INJECTION, SOLUTION INTRAMUSCULAR; INTRAVENOUS at 07:02

## 2023-02-17 RX ADMIN — DEXAMETHASONE SODIUM PHOSPHATE 8 MG: 4 INJECTION, SOLUTION INTRAMUSCULAR; INTRAVENOUS at 07:02

## 2023-02-17 RX ADMIN — SODIUM CHLORIDE, SODIUM LACTATE, POTASSIUM CHLORIDE, AND CALCIUM CHLORIDE: .6; .31; .03; .02 INJECTION, SOLUTION INTRAVENOUS at 07:02

## 2023-02-17 RX ADMIN — KETOROLAC TROMETHAMINE 30 MG: 30 INJECTION, SOLUTION INTRAMUSCULAR; INTRAVENOUS at 11:02

## 2023-02-17 RX ADMIN — FENTANYL CITRATE 50 MCG: 50 INJECTION, SOLUTION INTRAMUSCULAR; INTRAVENOUS at 07:02

## 2023-02-17 RX ADMIN — ONDANSETRON HYDROCHLORIDE 4 MG: 2 INJECTION INTRAMUSCULAR; INTRAVENOUS at 07:02

## 2023-02-17 RX ADMIN — OXYCODONE HYDROCHLORIDE 5 MG: 5 TABLET ORAL at 10:02

## 2023-02-17 RX ADMIN — SUGAMMADEX 200 MG: 100 INJECTION, SOLUTION INTRAVENOUS at 10:02

## 2023-02-17 RX ADMIN — ACETAMINOPHEN 1000 MG: 500 TABLET, FILM COATED ORAL at 05:02

## 2023-02-17 RX ADMIN — CEFAZOLIN 2 G: 330 INJECTION, POWDER, FOR SOLUTION INTRAMUSCULAR; INTRAVENOUS at 07:02

## 2023-02-17 RX ADMIN — GLYCOPYRROLATE 0.2 MG: 0.2 INJECTION, SOLUTION INTRAMUSCULAR; INTRAVITREAL at 07:02

## 2023-02-17 RX ADMIN — EPHEDRINE SULFATE 20 MG: 50 INJECTION INTRAVENOUS at 07:02

## 2023-02-17 RX ADMIN — PROPOFOL 150 MG: 10 INJECTION, EMULSION INTRAVENOUS at 07:02

## 2023-02-17 NOTE — INTERVAL H&P NOTE
The patient has been examined and the H&P has been reviewed:    I concur with the findings and no changes have occurred since H&P was written.    Surgery risks, benefits and alternative options discussed and understood by patient/family.          There are no hospital problems to display for this patient.     less than 70 (for Diabetics or multiple risk factors)

## 2023-02-17 NOTE — PLAN OF CARE
Lewis Fish has met all discharge criteria from Phase II. Vital Signs are stable, ambulating  without difficulty. Discharge instructions given, patient verbalized understanding. Discharged from facility via wheelchair in stable condition.

## 2023-02-17 NOTE — ANESTHESIA PROCEDURE NOTES
Peripheral Block    Patient location during procedure: pre-op   Block not for primary anesthetic.  Reason for block: at surgeon's request and post-op pain management   Post-op Pain Location: Left Shoulder Pain   Start time: 2/17/2023 7:05 AM  Timeout: 2/17/2023 7:04 AM   End time: 2/17/2023 7:08 AM    Staffing  Authorizing Provider: Zach Howell MD  Performing Provider: Zach Howell MD    Preanesthetic Checklist  Completed: patient identified, IV checked, site marked, risks and benefits discussed, surgical consent, monitors and equipment checked, pre-op evaluation and timeout performed  Peripheral Block  Patient position: right lateral decubitus  Prep: ChloraPrep  Patient monitoring: heart rate, cardiac monitor, continuous pulse ox, continuous capnometry and frequent blood pressure checks  Block type: interscalene  Laterality: left  Injection technique: single shot  Needle  Needle type: Stimuplex   Needle gauge: 22 G  Needle length: 2 in  Needle localization: anatomical landmarks and ultrasound guidance   -ultrasound image captured on disc.  Assessment  Injection assessment: negative aspiration, negative parasthesia and local visualized surrounding nerve  Paresthesia pain: none  Heart rate change: no  Slow fractionated injection: yes  Pain Tolerance: comfortable throughout block  Medications:    Medications: ropivacaine (NAROPIN) injection 0.5% - Perineural   30 mL - 2/17/2023 7:06:00 AM    Additional Notes  VSS.  [facility] RN monitoring vitals throughout procedure.  Patient tolerated procedure well.

## 2023-02-17 NOTE — TRANSFER OF CARE
"Anesthesia Transfer of Care Note    Patient: Lewis Fish    Procedure(s) Performed: Procedure(s) (LRB):  ARTHROSCOPY, SHOULDER (Left)  REPAIR, ROTATOR CUFF (Left)  ARTHROSCOPY,SHOULDER,WITH BICEPS TENODESIS (Left)    Patient location: PACU    Anesthesia Type: general    Transport from OR: Transported from OR on 2-3 L/min O2 by NC with adequate spontaneous ventilation    Post pain: adequate analgesia    Post assessment: no apparent anesthetic complications and tolerated procedure well    Post vital signs: stable    Level of consciousness: awake and responds to stimulation    Nausea/Vomiting: no nausea/vomiting    Complications: none    Transfer of care protocol was followed      Last vitals:   Visit Vitals  /63 (BP Location: Right arm, Patient Position: Lying)   Pulse 67   Temp 36.4 °C (97.6 °F) (Temporal)   Resp 18   Ht 6' 2" (1.88 m)   Wt 90.7 kg (200 lb)   SpO2 100%   BMI 25.68 kg/m²     "

## 2023-02-17 NOTE — OR NURSING
Dr. Lobato made aware of patient's rib pain  d/t fall last night. Toradol 30 mg ordered.  Will admin as available.

## 2023-02-17 NOTE — ANESTHESIA PROCEDURE NOTES
Intubation    Date/Time: 2/17/2023 7:23 AM  Performed by: Jp Self CRNA  Authorized by: Zach Howell MD     Intubation:     Induction:  Intravenous    Intubated:  Postinduction    Mask Ventilation:  Easy mask    Attempts:  1    Attempted By:  CRNA    Method of Intubation:  Video laryngoscopy    Blade:  Fisher 4    Laryngeal View Grade: Grade I - full view of cords      Difficult Airway Encountered?: No      Complications:  None    Airway Device:  Oral endotracheal tube    Airway Device Size:  7.5    Style/Cuff Inflation:  Cuffed (inflated to minimal occlusive pressure)    Tube secured:  22    Secured at:  The lips    Placement Verified By:  Capnometry    Complicating Factors:  None    Findings Post-Intubation:  BS equal bilateral and atraumatic/condition of teeth unchanged

## 2023-02-17 NOTE — BRIEF OP NOTE
[facility] ([facility]   Operative Note     SUMMARY     Surgery Date: 2/17/2023     Surgeon(s) and Role:     * Claude S. Williams IV, MD - Primary    Assisting Surgeon: None    Pre-op Diagnosis:  Rotator cuff syndrome of left shoulder [M75.102]    Post-op Diagnosis:  Post-Op Diagnosis Codes:     * Rotator cuff syndrome of left shoulder [M75.102]  Left shoulder massive rotator cuff tear with retraction.  Left shoulder superior labral tear.    Procedure(s) (LRB):  ARTHROSCOPY, SHOULDER (Left)  REPAIR, ROTATOR CUFF (Left)  ARTHROSCOPY,SHOULDER,WITH BICEPS TENODESIS (Left)    Anesthesia: General    Description of the findings of the procedure: as above    Findings/Key Components: as above    Procedure detail: After proper informed consent was obtained the patient underwent interscalene block per the anesthesiologist at difficulty.  The patient was then transported the operative suite placed supine on the operating table.  General endotracheal anesthesia was administered per the anesthesiologist without difficulty.  The patient was placed into a well-padded beach chair position and all bony prominences were protected.  Examination under anesthesia left shoulder demonstrated full passive mobility in elevation and external rotation with slightly restricted internal rotation, no crepitation or pathologic instability.  Routine preoperative time-out was taken to the operative site was positively identified by the operative team.  Preoperative antibiotics were administered.  A posterior incision was made and a standard posterior arthroscopic portal was established and a comprehensive arthroscopy of the glenohumeral joint carried out and demonstrated only some grade 1 chondromalacia of the glenoid and humeral head.  There was a massive retracted rotator cuff tear.  The biceps tendon had some subluxation and synovitis.  There was a superior labral tear at the attachment.  An anterior portal was established and the probe  was introduced.  Biceps tenotomy was performed and the superior labral tear was debrided thoroughly anteriorly and posteriorly.  No loose bodies or other pathology was identified.  The arthroscope was placed then from the posterior portal into the subacromial space and a lateral portal was established.  Comprehensive bursectomy was carried out on the rotator cuff was identified and found to be in fairly good shape with fairly healthy-appearing infraspinatus and supraspinatus tendon although quite retracted they were fairly mobile.  The anatomic insertion site of the greater tuberosity was prepared with the arthroscopic shaver and repair was then carried out with 2 medial row double arm why not con Med Linvatec suture anchors which had excellent purchase.  A mattress stitch was placed most anteriorly as well and the supraspinatus tendon for incorporation of the lateral row.  The medial row was tied and there was a stable medial row repair.  The shoulder secondary range of motion there was found to be no gapping or displacement.  The suture limbs were then brought out the lateral portal and crossed and placed into 2 lateral row anchors using the knotless pop lock suture anchors.  Through the posterior portal the arthroscopic bur was used to perform an acromioplasty and the shoulder was then taken through range of motion and found have full motion with no further impingement and a stable repair of the rotator cuff.  Instrumentation was then removed and a small anterior incision was made about 3 cm at the deltopectoral fold and careful dissection was carried down through the subdermal tissue using Bovie cautery for hemostasis.  The pectoral tendon was elevated and the biceps was identified withdrawn into the wound and then tenodesed to the bicipital groove using the tunnel lock knotless suture anchor which was placed around the tendon and through a bone tunnel in the proximal humerus and tied securely.  The wound was  then irrigated and wounds were then closed with Monocryl and nylon skin suture.  A sterile soft dressing was then applied and an abduction pillow sling was placed.  The patient was awakened in the operative suite and taken to recovery in stable condition he tolerated the procedure very well.  Lap instrument and needle counts were correct.  Blood loss minimal.  Complications none.    Estimated Blood Loss: * No values recorded between 2/17/2023  7:46 AM and 2/17/2023 10:12 AM *         Specimens:   Specimen (24h ago, onward)      None            Discharge Note    SUMMARY     Admit Date: 2/17/2023    Discharge Date and Time:  02/17/2023 10:17 AM    Hospital Course (synopsis of major diagnoses, care, treatment, and services provided during the course of the hospital stay):  Uneventful     Final Diagnosis: Post-Op Diagnosis Codes:     * Rotator cuff syndrome of left shoulder [M75.102]    Disposition: Home or Self Care    Follow Up/Patient Instructions:     Medications:  Reconciled Home Medications:      Medication List        START taking these medications      oxyCODONE-acetaminophen 5-325 mg per tablet  Commonly known as: PERCOCET  Take 1 tablet by mouth every 6 (six) hours as needed for Pain.            CONTINUE taking these medications      cetirizine 10 MG tablet  Commonly known as: ZYRTEC  Take 10 mg by mouth once daily.     * dextroamphetamine-amphetamine 20 MG 24 hr capsule  Commonly known as: ADDERALL XR  Take 20 mg by mouth every morning.     * dextroamphetamine-amphetamine 30 MG 24 hr capsule  Commonly known as: ADDERALL XR  Take 30 mg by mouth every morning.     fluticasone propionate 50 mcg/actuation nasal spray  Commonly known as: FLONASE  1 spray by Each Nostril route once daily.     sertraline 100 MG tablet  Commonly known as: ZOLOFT  Take 1 tablet (100 mg total) by mouth every evening.     tadalafiL 20 MG Tab  Commonly known as: CIALIS  Take 1 tablet (20 mg total) by mouth daily as needed.           *  This list has 2 medication(s) that are the same as other medications prescribed for you. Read the directions carefully, and ask your doctor or other care provider to review them with you.                Discharge Procedure Orders   Diet general     Leave dressing on - Keep it clean, dry, and intact until clinic visit     Call MD for:  temperature >100.4     Call MD for:  persistent nausea and vomiting     Call MD for:  severe uncontrolled pain     Call MD for:  difficulty breathing, headache or visual disturbances     Call MD for:  redness, tenderness, or signs of infection (pain, swelling, redness, odor or green/yellow discharge around incision site)     Call MD for:  hives     Call MD for:  persistent dizziness or light-headedness     Call MD for:  extreme fatigue     Ice to affected area     Lifting restrictions     No driving, operating heavy equipment or signing legal documents while taking pain medication      Follow-up Information       Claude S. Williams Iv, MD Follow up in 3 day(s).    Specialty: Orthopedic Surgery  Why: For wound re-check, For suture removal  Contact information:  [address]  [facility] [id number] [phone]

## 2023-02-17 NOTE — ANESTHESIA POSTPROCEDURE EVALUATION
Anesthesia Post Evaluation    Patient: Lewis Fish    Procedure(s) Performed: Procedure(s) (LRB):  ARTHROSCOPY, SHOULDER (Left)  REPAIR, ROTATOR CUFF (Left)  ARTHROSCOPY,SHOULDER,WITH BICEPS TENODESIS (Left)    Final Anesthesia Type: general      Patient location during evaluation: PACU  Patient participation: Yes- Able to Participate  Level of consciousness: awake and alert  Post-procedure vital signs: reviewed and stable  Pain management: adequate  Airway patency: patent    PONV status at discharge: No PONV  Anesthetic complications: no      Cardiovascular status: blood pressure returned to baseline  Respiratory status: spontaneous ventilation  Hydration status: euvolemic  Follow-up not needed.          Vitals Value Taken Time   BP 97/55 02/17/23 1030   Temp 36.4 °C (97.6 °F) 02/17/23 1013   Pulse 69 02/17/23 1036   Resp 18 02/17/23 1030   SpO2 96 % 02/17/23 1036   Vitals shown include unvalidated device data.      No case tracking events are documented in the log.      Pain/Nany Score: Pain Rating Prior to Med Admin: 0 (2/17/2023  5:37 AM)  Nany Score: 8 (2/17/2023 10:13 AM)

## 2023-03-06 ENCOUNTER — OFFICE VISIT (OUTPATIENT)
Dept: URGENT CARE | Facility: CLINIC | Age: 68
End: 2023-03-06
Payer: COMMERCIAL

## 2023-03-06 VITALS
WEIGHT: 200 LBS | BODY MASS INDEX: 25.67 KG/M2 | RESPIRATION RATE: 18 BRPM | HEIGHT: 74 IN | SYSTOLIC BLOOD PRESSURE: 133 MMHG | DIASTOLIC BLOOD PRESSURE: 85 MMHG | HEART RATE: 72 BPM | TEMPERATURE: 98 F | OXYGEN SATURATION: 99 %

## 2023-03-06 DIAGNOSIS — I80.9 PHLEBITIS: Primary | ICD-10-CM

## 2023-03-06 DIAGNOSIS — I80.9 SUPERFICIAL PHLEBITIS: ICD-10-CM

## 2023-03-06 DIAGNOSIS — M79.604 PAIN OF RIGHT LOWER EXTREMITY: ICD-10-CM

## 2023-03-06 PROCEDURE — 99214 PR OFFICE/OUTPT VISIT, EST, LEVL IV, 30-39 MIN: ICD-10-PCS | Mod: S$GLB,,, | Performed by: FAMILY MEDICINE

## 2023-03-06 PROCEDURE — 99214 OFFICE O/P EST MOD 30 MIN: CPT | Mod: S$GLB,,, | Performed by: FAMILY MEDICINE

## 2023-03-06 RX ORDER — SILDENAFIL 100 MG/1
TABLET, FILM COATED ORAL
COMMUNITY
Start: 2023-02-13 | End: 2023-03-30

## 2023-03-06 NOTE — PROGRESS NOTES
"Subjective:       Patient ID: Lewis Fish is a 67 y.o. male.    Vitals:  height is 6' 2" (1.88 m) and weight is 90.7 kg (200 lb). His temperature is 97.9 °F (36.6 °C). His blood pressure is 133/85 and his pulse is 72. His respiration is 18 and oxygen saturation is 99%.     Chief Complaint: Leg Pain (Right /)    Pt presents complaints of right leg pain. Pain started 2 days. Unchanged. Constant. Pt states he was out walking and the pain came suddenly. Pain level 4. OTC none. Pt has large varicose veins. He also has prostate cancer and lastly he fell in January and tore muscles in his left shoulder. He had surgery to repair this in mid February. He denies CP or SOB , denies family history of clotting disorders. He denies leg swelling (besides in the rt thigh vein). He does state a few areas of the veins on his lower legs are also mildy tender.     Leg Pain   The incident occurred 2 days ago. There was no injury mechanism. The pain is present in the right leg. The quality of the pain is described as shooting. The pain is at a severity of 4/10. The pain is mild. The pain has been Constant since onset. Associated symptoms include an inability to bear weight. Pertinent negatives include no numbness or tingling. He reports no foreign bodies present. The symptoms are aggravated by movement and weight bearing. He has tried nothing for the symptoms.   Neurological:  Negative for numbness.     Objective:      Physical Exam   Cardiovascular: Normal rate and regular rhythm.   Pulmonary/Chest: Effort normal and breath sounds normal.   Abdominal: Normal appearance.   Musculoskeletal: Normal range of motion.         General: Swelling and tenderness present. Normal range of motion.      Right lower leg: No edema.      Left lower leg: No edema.      Comments: Swelling and redness and firm ropy vein in anterior rt thigh, no other edema or erythema or induration   Neurological: no focal deficit. He is alert.   Skin: Skin is warm and " dry.   Psychiatric: His behavior is normal. Judgment and thought content normal.   Nursing note and vitals reviewed.      Assessment:       1. Phlebitis    2. Superficial phlebitis    3. Pain of right lower extremity          Plan:         Phlebitis  -     US Lower Extremity Veins Bilateral; Future; Expected date: 03/06/2023    Superficial phlebitis    Pain of right lower extremity    Pt or guardian provided educational materials and instructions regarding their visit diagnosis.

## 2023-03-07 ENCOUNTER — TELEPHONE (OUTPATIENT)
Dept: URGENT CARE | Facility: CLINIC | Age: 68
End: 2023-03-07
Payer: COMMERCIAL

## 2023-03-07 ENCOUNTER — HOSPITAL ENCOUNTER (OUTPATIENT)
Dept: RADIOLOGY | Facility: HOSPITAL | Age: 68
Discharge: HOME | End: 2023-03-07
Attending: FAMILY MEDICINE
Payer: COMMERCIAL

## 2023-03-07 DIAGNOSIS — I80.9 PHLEBITIS: ICD-10-CM

## 2023-03-07 PROCEDURE — 93970 US LOWER EXTREMITY VEINS BILATERAL: ICD-10-PCS | Mod: 26,,, | Performed by: RADIOLOGY

## 2023-03-07 PROCEDURE — 93970 EXTREMITY STUDY: CPT | Mod: TC

## 2023-03-07 PROCEDURE — 93970 EXTREMITY STUDY: CPT | Mod: 26,,, | Performed by: RADIOLOGY

## 2023-03-07 NOTE — TELEPHONE ENCOUNTER
"Called pt and let know US neg and to review notes in portal for him:     Notes in pt portal include:   "Hi Mr. Fish, your ultrasound of the legs did not show any deep vein thrombosis (blood clots) so that is good news. Follow up with your primary care doctor and return to our clinic if needed and if any significantly worsening symptoms, I recommend the ER"     I also cc his PCP so she is aware of his issue  "

## 2023-03-09 ENCOUNTER — OFFICE VISIT (OUTPATIENT)
Dept: UROLOGY | Facility: CLINIC | Age: 68
End: 2023-03-09
Payer: COMMERCIAL

## 2023-03-09 VITALS
WEIGHT: 205.56 LBS | HEIGHT: 74 IN | BODY MASS INDEX: 26.38 KG/M2 | DIASTOLIC BLOOD PRESSURE: 73 MMHG | HEART RATE: 79 BPM | SYSTOLIC BLOOD PRESSURE: 124 MMHG

## 2023-03-09 DIAGNOSIS — N48.6 PEYRONIE'S DISEASE: ICD-10-CM

## 2023-03-09 DIAGNOSIS — N52.01 ERECTILE DYSFUNCTION DUE TO ARTERIAL INSUFFICIENCY: ICD-10-CM

## 2023-03-09 DIAGNOSIS — R39.198 SLOW URINARY STREAM: Primary | ICD-10-CM

## 2023-03-09 PROCEDURE — 99204 OFFICE O/P NEW MOD 45 MIN: CPT | Mod: S$GLB,,, | Performed by: NURSE PRACTITIONER

## 2023-03-09 PROCEDURE — 99204 PR OFFICE/OUTPT VISIT, NEW, LEVL IV, 45-59 MIN: ICD-10-PCS | Mod: S$GLB,,, | Performed by: NURSE PRACTITIONER

## 2023-03-09 RX ORDER — TAMSULOSIN HYDROCHLORIDE 0.4 MG/1
0.4 CAPSULE ORAL DAILY
Qty: 30 CAPSULE | Refills: 11 | Status: SHIPPED | OUTPATIENT
Start: 2023-03-09 | End: 2024-03-01

## 2023-03-09 RX ORDER — TADALAFIL 20 MG/1
20 TABLET ORAL
Qty: 30 TABLET | Refills: 11 | Status: SHIPPED | OUTPATIENT
Start: 2023-03-09 | End: 2024-03-08

## 2023-03-09 NOTE — PROGRESS NOTES
Subjective:      Lewis Fish is a 67 y.o. male who was self-referred for evaluation of his ED and penile curvature.  Previously seen by Dr. Butler and new to me today.    S/p brachytherapy for prostate cancer in 2008.      Component PSA Diagnostic   Latest Ref Rng & Units 0.00 - 4.00 ng/mL   1/7/2022 <0.01   1/17/2019 <0.01     He presents today reporting an intermittent urinary stream. States that stream is okay if he is active, slower after being sedentary. Mild frequency and urgency.    [facility]  Question 3/4/2023  1:40 PM CST - Filed by Patient   Over the past months, have you had:     Incomplete emptying: How often have you had the sensation of not emptying your bladder completely after you finished urinating?  0-None   Frquency: How often have you had to urinate again less than two hours after you finished urinating? 1-Less than one time in five   Intermittency: How often have you found you stopped and started again several times when you urinated? 5-Almost always   Urgency: How often do you find it difficult to postpone urination? 2-Less than half the time   Weak stream: How often have you had a weak urinary stream? 4-More than half the time   Straining: How often have you had to push or strain to begin urination? 0-Not at all   Sleeping: How many times did you most typically get up to urinate from the time you went to bed at night until the time you got up in the morning?  1 - One time   Quality of life due to urinary symptoms: If you were to spend the rest of your life with your urinary condition the way it is now, how would you feel about that?  2-Mostly satisfied   IPSS Score  (range: 0 - 35) 13 (Moderate symtoms)     Also with ED for > 6 months. Difficulty attaining and maintaining an erection. Has tried viagra with little improvement. Previously used Cialis but this became unaffordable after an insurance change. Denies adverse SE of the medications.     Additionally he reports a recent  change to the shape of his penis with erection. Slight curvature <30 degrees. Denies painful erections. Doesn't interfere with penetration.     The following portions of the patient's history were reviewed and updated as appropriate: allergies, current medications, past family history, past medical history, past social history, past surgical history and problem list.    Review of Systems  Constitutional: no fever or chills  ENT: no nasal congestion or sore throat  Respiratory: no cough or shortness of breath  Cardiovascular: no chest pain or palpitations  Gastrointestinal: no nausea or vomiting, tolerating diet  Genitourinary: as per HPI  Hematologic/Lymphatic: no easy bruising or lymphadenopathy  Musculoskeletal: no arthralgias or myalgias  Neurological: no seizures or tremors  Behavioral/Psych: no auditory or visual hallucinations     Objective:   Vitals:   Vitals:    03/09/23 0843   BP: 124/73   Pulse: 79     Physical Exam   General: alert and oriented, no acute distress  Head: normocephalic, atraumatic  Neck: supple, normal ROM  Respiratory: Symmetric expansion, non-labored breathing  Cardiovascular: regular rate and rhythm  Abdomen: soft, non tender, non distended  Genitourinary: deferred  Skin: normal coloration and turgor, no rashes, no suspicious skin lesions noted  Neuro: alert and oriented x3, no gross deficits  Psych: normal judgment and insight, normal mood/affect, and non-anxious    Lab Review   Urinalysis demonstrates: no sample  Lab Results   Component Value Date    WBC 5.06 02/14/2023    HGB 15.0 02/14/2023    HCT 42.9 02/14/2023    MCV 85 02/14/2023     (L) 02/14/2023     Lab Results   Component Value Date    CREATININE 1.3 02/14/2023    BUN 21 02/14/2023     Lab Results   Component Value Date    PSA 0.02 04/07/2017     Imaging  None    Assessment:     1. Slow urinary stream    2. Erectile dysfunction due to arterial insufficiency    3. Peyronie's disease      Plan:   Lewis was seen today  for new patient.    Diagnoses and all orders for this visit:    Slow urinary stream  -     tamsulosin (FLOMAX) 0.4 mg Cap; Take 1 capsule (0.4 mg total) by mouth once daily.    Erectile dysfunction due to arterial insufficiency  -     tadalafiL (CIALIS) 20 MG Tab; Take 1 tablet (20 mg total) by mouth every 72 hours as needed (ED). Take at least 2 hours before activity    Peyronie's disease    Plan:  --Early peyronie's - discussed that this may resolve spontaneously over the next 6 months. If it doesn't or begins to interfere with sexual activity would consider referral to Dr. Guillen for xiaflex eval  --Trial of Cialis PRN  --Trial of Flomax  --Follow up in 3 months

## 2023-03-21 ENCOUNTER — OFFICE VISIT (OUTPATIENT)
Dept: INTERNAL MEDICINE | Facility: CLINIC | Age: 68
End: 2023-03-21
Attending: INTERNAL MEDICINE
Payer: COMMERCIAL

## 2023-03-21 VITALS
DIASTOLIC BLOOD PRESSURE: 74 MMHG | BODY MASS INDEX: 26.4 KG/M2 | OXYGEN SATURATION: 98 % | SYSTOLIC BLOOD PRESSURE: 116 MMHG | HEART RATE: 90 BPM | HEIGHT: 74 IN | WEIGHT: 205.69 LBS

## 2023-03-21 DIAGNOSIS — F90.2 ATTENTION DEFICIT HYPERACTIVITY DISORDER (ADHD), COMBINED TYPE: ICD-10-CM

## 2023-03-21 DIAGNOSIS — C61 PROSTATE CANCER: ICD-10-CM

## 2023-03-21 DIAGNOSIS — I83.893 VARICOSE VEINS OF BOTH LEGS WITH EDEMA: ICD-10-CM

## 2023-03-21 DIAGNOSIS — I83.893 VARICOSE VEINS OF LEG WITH EDEMA, BILATERAL: Primary | ICD-10-CM

## 2023-03-21 DIAGNOSIS — D75.839 THROMBOCYTOSIS: ICD-10-CM

## 2023-03-21 DIAGNOSIS — Z00.00 ANNUAL PHYSICAL EXAM: Primary | ICD-10-CM

## 2023-03-21 DIAGNOSIS — Z85.46 PERSONAL HISTORY OF PROSTATE CANCER: ICD-10-CM

## 2023-03-21 PROCEDURE — 99397 PER PM REEVAL EST PAT 65+ YR: CPT | Mod: S$GLB,,, | Performed by: INTERNAL MEDICINE

## 2023-03-21 PROCEDURE — 99397 PR PREVENTIVE VISIT,EST,65 & OVER: ICD-10-PCS | Mod: S$GLB,,, | Performed by: INTERNAL MEDICINE

## 2023-03-21 PROCEDURE — 99999 PR PBB SHADOW E&M-EST. PATIENT-LVL IV: CPT | Mod: PBBFAC,,, | Performed by: INTERNAL MEDICINE

## 2023-03-21 PROCEDURE — 99999 PR PBB SHADOW E&M-EST. PATIENT-LVL IV: ICD-10-PCS | Mod: PBBFAC,,, | Performed by: INTERNAL MEDICINE

## 2023-03-21 NOTE — PROGRESS NOTES
Subjective:       Patient ID: Lewis Fish is a 67 y.o. male.    Chief Complaint: Annual Exam     Lewis Fish is a 67 y.o.  male who presents for Annual Exam  .  Without complaints. Doing well.     Did have recent superficial phlebitis, has occurred previously.  He is unsure why this is happening.      Patient Active Problem List   Diagnosis    Prostate cancer    Atypical nevus    ADHD (attention deficit hyperactivity disorder)    CHEO (generalized anxiety disorder)    Varicose veins of both legs with edema           Past Medical History:   Diagnosis Date    ADHD (attention deficit hyperactivity disorder)     Allergy seasonal    Anxiety     Depression     Family history of early CAD     brother with CAD age 49    Gilbert syndrome     History of psychiatric care     Prostate cancer prostate    bradytherapy    Psychiatric problem     Therapy        Past Surgical History:   Procedure Laterality Date    ANKLE LIGAMENT RECONSTRUCTION  2014    right    ARTHROSCOPY,SHOULDER,WITH BICEPS TENODESIS Left 2/17/2023    Procedure: ARTHROSCOPY,SHOULDER,WITH BICEPS TENODESIS;  Surgeon: Claude S. Williams IV, MD;  Location: [facility];  Service: Orthopedics;  Laterality: Left;    COLONOSCOPY N/A 5/12/2022    Procedure: COLONOSCOPY;  Surgeon: Sid Ko MD;  Location: [address]);  Service: Endoscopy;  Laterality: N/A;  fully vaccinated -   instructions emailed-     HERNIA REPAIR  1996    inguinal bilateral    PROSTATE SURGERY  2008    brachytherapy    ROTATOR CUFF REPAIR Left 2/17/2023    Procedure: REPAIR, ROTATOR CUFF;  Surgeon: Claude S. Williams IV, MD;  Location: [facility];  Service: Orthopedics;  Laterality: Left;    SHOULDER ARTHROSCOPY Left 2/17/2023    Procedure: ARTHROSCOPY, SHOULDER;  Surgeon: Claude S. Williams IV, MD;  Location: [facility];  Service: Orthopedics;  Laterality: Left;    testicle removed      complication of hernia repair    TUMOR REMOVAL  1996    nail bed       Family History   Problem  "Relation Age of Onset    Arthritis Mother     Skin cancer Mother     Asthma Brother     Alcohol abuse Maternal Grandfather     Alcohol abuse Paternal Grandmother     Skin cancer Maternal Grandmother     Macular degeneration Maternal Grandmother     Coronary artery disease Brother 49    Drug abuse Other     Glaucoma Neg Hx     Strabismus Neg Hx     Stroke Neg Hx     Blindness Neg Hx        Social History     Tobacco Use    Smoking status: Never    Smokeless tobacco: Never   Substance Use Topics    Alcohol use: Yes     Alcohol/week: 1.0 standard drink     Types: 1 Cans of beer per week     Comment: less than one a week    Drug use: Never         Review of Systems   Constitutional:  Negative for chills and fever.   Respiratory:  Negative for cough and shortness of breath.    Cardiovascular:  Negative for chest pain and palpitations.   Gastrointestinal:  Negative for nausea and vomiting.   Genitourinary:  Negative for dysuria and hematuria.       Objective:   Blood pressure 116/74, pulse 90, height 6' 2" (1.88 m), weight 93.3 kg (205 lb 11 oz), SpO2 98 %.     Physical Exam  Constitutional:       Appearance: Normal appearance. He is well-developed. He is not ill-appearing.   HENT:      Head: Normocephalic and atraumatic.   Eyes:      General: No scleral icterus.     Conjunctiva/sclera: Conjunctivae normal.   Cardiovascular:      Rate and Rhythm: Normal rate.      Heart sounds: Normal heart sounds. No murmur heard.    No friction rub. No gallop.      Comments: + spider veins with prominent areas, no increased calor or erythema noted at this time  Pulmonary:      Effort: Pulmonary effort is normal.      Breath sounds: Normal breath sounds. No wheezing or rales.   Chest:      Chest wall: No tenderness.   Abdominal:      General: Bowel sounds are normal.      Palpations: Abdomen is soft.   Musculoskeletal:         General: No tenderness or signs of injury.   Skin:     General: Skin is warm and dry.   Neurological:      " Mental Status: He is alert and oriented to person, place, and time. Mental status is at baseline.   Psychiatric:         Behavior: Behavior normal.         Thought Content: Thought content normal.       Prior labs reviewed    Health Maintenance         Date Due Completion Date    TETANUS VACCINE Never done ---    Pneumococcal Vaccines (Age 65+) (2 - PCV) 01/17/2020 1/17/2019    Lipid Panel 04/07/2022 4/7/2017    COVID-19 Vaccine (5 - Booster for Pfizer series) 06/19/2022 4/24/2022    PROSTATE-SPECIFIC ANTIGEN 01/07/2023 1/7/2022    Hemoglobin A1c (Diabetic Prevention Screening) 10/12/2023 10/12/2020    Colorectal Cancer Screening 05/12/2029 5/12/2022    Override on 7/12/2011: Done            Assessment/Plan:       1. Annual physical exam  -     Lipid Panel; Future; Expected date: 03/21/2023  -     CBC Auto Differential; Future; Expected date: 03/21/2023  -     Comprehensive Metabolic Panel; Future; Expected date: 03/21/2023  -     Hemoglobin A1C; Future; Expected date: 03/21/2023  Recommend daily sunscreen, cardiovascular exercise min 30 min 5 days per week. Seatbelts routinely.    2. Thrombocytosis  Comments:  repeat lab, no evidence of bleeding or bruising    3. Attention deficit hyperactivity disorder (ADHD), combined type  Comments:  still on stimulants  will discuss with his provider plan for med management as he ages  Overview:  Followed by Dr Dwight Rincon [address] [facility] [phone]  Controlled with adderall XR 50 mg daily      4. Personal history of prostate cancer  -     PROSTATE SPECIFIC ANTIGEN, DIAGNOSTIC; Future; Expected date: 03/21/2023    5. Varicose veins of both legs with edema  Overview:  Recent phlebitis  Refer to vas medicine for possible intervention    Orders:  -     Ambulatory referral/consult to Vascular Surgery; Future; Expected date: 03/28/2023    6. Prostate cancer  Overview:  Brachytherapy 2009   Repeat PSAs negative      Other orders  -     (In Office Administered)  Pneumococcal Conjugate Vaccine (20 Valent) (IM)          Medication List with Changes/Refills   Current Medications    CETIRIZINE (ZYRTEC) 10 MG TABLET    Take 10 mg by mouth once daily.    DEXTROAMPHETAMINE-AMPHETAMINE (ADDERALL XR) 20 MG 24 HR CAPSULE    Take 20 mg by mouth every morning.    DEXTROAMPHETAMINE-AMPHETAMINE (ADDERALL XR) 30 MG 24 HR CAPSULE    Take 30 mg by mouth every morning.    FLUTICASONE PROPIONATE (FLONASE) 50 MCG/ACTUATION NASAL SPRAY    1 spray by Each Nostril route once daily.    SERTRALINE (ZOLOFT) 100 MG TABLET    Take 1 tablet (100 mg total) by mouth every evening.    SILDENAFIL (VIAGRA) 100 MG TABLET    TAKE 1 TABLET BY MOUTH 45 TO 60 MINUTES PRIOR TO SEXUAL ACTIVITY FOR ERECTILE DYSFUNCTION. DO NOT EXCEED 1 TABLET IN 24 HOURS.    TADALAFIL (CIALIS) 20 MG TAB    Take 1 tablet (20 mg total) by mouth daily as needed.    TADALAFIL (CIALIS) 20 MG TAB    Take 1 tablet (20 mg total) by mouth every 72 hours as needed (ED). Take at least 2 hours before activity    TAMSULOSIN (FLOMAX) 0.4 MG CAP    Take 1 capsule (0.4 mg total) by mouth once daily.       Recommend patient complete vaccinations as listed in the overdue health maintenance report. Pt may obtain vaccinations at their local pharmacy, any [facility] pharmacy or request vaccination in our clinic.  Please note that some insurances will only cover vaccination cost at specific locations.Please check with your insurance provider regarding your coverage.  Vaccines that are not covered are still recommended.

## 2023-03-30 ENCOUNTER — PATIENT MESSAGE (OUTPATIENT)
Dept: INTERNAL MEDICINE | Facility: CLINIC | Age: 68
End: 2023-03-30
Payer: COMMERCIAL

## 2023-03-30 ENCOUNTER — LAB VISIT (OUTPATIENT)
Dept: LAB | Facility: OTHER | Age: 68
End: 2023-03-30
Attending: INTERNAL MEDICINE
Payer: COMMERCIAL

## 2023-03-30 DIAGNOSIS — Z00.00 ANNUAL PHYSICAL EXAM: ICD-10-CM

## 2023-03-30 DIAGNOSIS — E11.9 NEW ONSET OF TYPE 2 DIABETES MELLITUS IN PEDIATRIC PATIENT: Primary | ICD-10-CM

## 2023-03-30 LAB
ALBUMIN SERPL BCP-MCNC: 4.3 G/DL (ref 3.5–5.2)
ALP SERPL-CCNC: 69 U/L (ref 55–135)
ALT SERPL W/O P-5'-P-CCNC: 17 U/L (ref 10–44)
ANION GAP SERPL CALC-SCNC: 7 MMOL/L (ref 8–16)
AST SERPL-CCNC: 16 U/L (ref 10–40)
BASOPHILS # BLD AUTO: 0.02 K/UL (ref 0–0.2)
BASOPHILS NFR BLD: 0.5 % (ref 0–1.9)
BILIRUB SERPL-MCNC: 2.5 MG/DL (ref 0.1–1)
BUN SERPL-MCNC: 22 MG/DL (ref 8–23)
CALCIUM SERPL-MCNC: 9.3 MG/DL (ref 8.7–10.5)
CHLORIDE SERPL-SCNC: 108 MMOL/L (ref 95–110)
CHOLEST SERPL-MCNC: 160 MG/DL (ref 120–199)
CHOLEST/HDLC SERPL: 4.1 {RATIO} (ref 2–5)
CO2 SERPL-SCNC: 26 MMOL/L (ref 23–29)
CREAT SERPL-MCNC: 1.4 MG/DL (ref 0.5–1.4)
DIFFERENTIAL METHOD: ABNORMAL
EOSINOPHIL # BLD AUTO: 0.1 K/UL (ref 0–0.5)
EOSINOPHIL NFR BLD: 1.4 % (ref 0–8)
ERYTHROCYTE [DISTWIDTH] IN BLOOD BY AUTOMATED COUNT: 13.4 % (ref 11.5–14.5)
EST. GFR  (NO RACE VARIABLE): 56 ML/MIN/1.73 M^2
ESTIMATED AVG GLUCOSE: 140 MG/DL (ref 68–131)
GLUCOSE SERPL-MCNC: 136 MG/DL (ref 70–110)
HBA1C MFR BLD: 6.5 % (ref 4–5.6)
HCT VFR BLD AUTO: 42.6 % (ref 40–54)
HDLC SERPL-MCNC: 39 MG/DL (ref 40–75)
HDLC SERPL: 24.4 % (ref 20–50)
HGB BLD-MCNC: 14.9 G/DL (ref 14–18)
IMM GRANULOCYTES # BLD AUTO: 0.03 K/UL (ref 0–0.04)
IMM GRANULOCYTES NFR BLD AUTO: 0.7 % (ref 0–0.5)
LDLC SERPL CALC-MCNC: 97.2 MG/DL (ref 63–159)
LYMPHOCYTES # BLD AUTO: 1.3 K/UL (ref 1–4.8)
LYMPHOCYTES NFR BLD: 30.4 % (ref 18–48)
MCH RBC QN AUTO: 29.2 PG (ref 27–31)
MCHC RBC AUTO-ENTMCNC: 35 G/DL (ref 32–36)
MCV RBC AUTO: 83 FL (ref 82–98)
MONOCYTES # BLD AUTO: 0.3 K/UL (ref 0.3–1)
MONOCYTES NFR BLD: 6.3 % (ref 4–15)
NEUTROPHILS # BLD AUTO: 2.7 K/UL (ref 1.8–7.7)
NEUTROPHILS NFR BLD: 60.7 % (ref 38–73)
NONHDLC SERPL-MCNC: 121 MG/DL
NRBC BLD-RTO: 0 /100 WBC
PLATELET # BLD AUTO: 144 K/UL (ref 150–450)
PMV BLD AUTO: 8.6 FL (ref 9.2–12.9)
POTASSIUM SERPL-SCNC: 4.3 MMOL/L (ref 3.5–5.1)
PROT SERPL-MCNC: 6.6 G/DL (ref 6–8.4)
RBC # BLD AUTO: 5.11 M/UL (ref 4.6–6.2)
SODIUM SERPL-SCNC: 141 MMOL/L (ref 136–145)
TRIGL SERPL-MCNC: 119 MG/DL (ref 30–150)
WBC # BLD AUTO: 4.41 K/UL (ref 3.9–12.7)

## 2023-03-30 PROCEDURE — 80053 COMPREHEN METABOLIC PANEL: CPT | Performed by: INTERNAL MEDICINE

## 2023-03-30 PROCEDURE — 85025 COMPLETE CBC W/AUTO DIFF WBC: CPT | Performed by: INTERNAL MEDICINE

## 2023-03-30 PROCEDURE — 83036 HEMOGLOBIN GLYCOSYLATED A1C: CPT | Performed by: INTERNAL MEDICINE

## 2023-03-30 PROCEDURE — 80061 LIPID PANEL: CPT | Performed by: INTERNAL MEDICINE

## 2023-03-30 PROCEDURE — 36415 COLL VENOUS BLD VENIPUNCTURE: CPT | Performed by: INTERNAL MEDICINE

## 2023-03-30 NOTE — TELEPHONE ENCOUNTER
Labs consistent with new onset DM.  Two elevated fasting glucose readings along with hba1c of 6.5.  Referred to diabetic educaton.  Needs virtual visit to discuss diagnosis and treatment plan.

## 2023-03-31 ENCOUNTER — TELEPHONE (OUTPATIENT)
Dept: DIABETES | Facility: CLINIC | Age: 68
End: 2023-03-31
Payer: COMMERCIAL

## 2023-04-03 ENCOUNTER — CLINICAL SUPPORT (OUTPATIENT)
Dept: DIABETES | Facility: CLINIC | Age: 68
End: 2023-04-03
Payer: COMMERCIAL

## 2023-04-03 VITALS — BODY MASS INDEX: 26.12 KG/M2 | WEIGHT: 203.5 LBS | HEIGHT: 74 IN

## 2023-04-03 DIAGNOSIS — E11.9 NEW ONSET OF TYPE 2 DIABETES MELLITUS IN PEDIATRIC PATIENT: ICD-10-CM

## 2023-04-03 PROCEDURE — 99999 PR PBB SHADOW E&M-EST. PATIENT-LVL II: CPT | Mod: PBBFAC,,,

## 2023-04-03 PROCEDURE — 99999 PR PBB SHADOW E&M-EST. PATIENT-LVL II: ICD-10-PCS | Mod: PBBFAC,,,

## 2023-04-03 PROCEDURE — G0108 PR DIAB MANAGE TRN  PER INDIV: ICD-10-PCS | Mod: S$GLB,,, | Performed by: FAMILY MEDICINE

## 2023-04-03 PROCEDURE — G0108 DIAB MANAGE TRN  PER INDIV: HCPCS | Mod: S$GLB,,, | Performed by: FAMILY MEDICINE

## 2023-04-04 ENCOUNTER — PATIENT MESSAGE (OUTPATIENT)
Dept: INTERNAL MEDICINE | Facility: CLINIC | Age: 68
End: 2023-04-04

## 2023-04-04 ENCOUNTER — OFFICE VISIT (OUTPATIENT)
Dept: INTERNAL MEDICINE | Facility: CLINIC | Age: 68
End: 2023-04-04
Attending: INTERNAL MEDICINE
Payer: COMMERCIAL

## 2023-04-04 DIAGNOSIS — E11.9 CONTROLLED TYPE 2 DIABETES MELLITUS WITHOUT COMPLICATION, WITHOUT LONG-TERM CURRENT USE OF INSULIN: Primary | ICD-10-CM

## 2023-04-04 PROBLEM — E10.9 CONTROLLED DIABETES MELLITUS TYPE 1 WITHOUT COMPLICATIONS: Status: ACTIVE | Noted: 2023-04-04

## 2023-04-04 PROCEDURE — 99213 PR OFFICE/OUTPT VISIT, EST, LEVL III, 20-29 MIN: ICD-10-PCS | Mod: 95,,, | Performed by: INTERNAL MEDICINE

## 2023-04-04 PROCEDURE — 99213 OFFICE O/P EST LOW 20 MIN: CPT | Mod: 95,,, | Performed by: INTERNAL MEDICINE

## 2023-04-04 RX ORDER — ASPIRIN 81 MG/1
81 TABLET ORAL DAILY
Qty: 90 TABLET | Refills: 3 | Status: SHIPPED | OUTPATIENT
Start: 2023-04-04 | End: 2023-10-04

## 2023-04-04 RX ORDER — ROSUVASTATIN CALCIUM 20 MG/1
20 TABLET, COATED ORAL NIGHTLY
Qty: 90 TABLET | Refills: 3 | Status: SHIPPED | OUTPATIENT
Start: 2023-04-04 | End: 2024-04-03

## 2023-04-04 NOTE — PROGRESS NOTES
The patient location is:  Patient Home   The chief complaint leading to consultation is:  Follow-up (Follow up of diagnosis of Diabetes)    Subjective:         REcent labs consistent with a new diagnosis of diabetes.  States he has a good diet but has been indulging his sweet tooth.  He has since his recent labs has cut out sweets, saw diabetic educator. Continues his regular exercise. Labs two years ago were normal.    Would like to avoid medication at this time. No on statin but agrees to take as long as we are managing diabetes.       Follow-up  Pertinent negatives include no arthralgias, chest pain, headaches, joint swelling, neck pain, vomiting or weakness.    Lewis Fish is a 67 y.o.  male who presents for Follow-up (Follow up of diagnosis of Diabetes)  .   Review of Systems   Constitutional:  Negative for activity change and unexpected weight change.   HENT:  Negative for hearing loss, rhinorrhea and trouble swallowing.    Eyes:  Negative for discharge and visual disturbance.   Respiratory:  Negative for chest tightness and wheezing.    Cardiovascular:  Negative for chest pain and palpitations.   Gastrointestinal:  Negative for blood in stool, constipation, diarrhea and vomiting.   Endocrine: Negative for polydipsia and polyuria.   Genitourinary:  Negative for difficulty urinating, hematuria and urgency.   Musculoskeletal:  Negative for arthralgias, joint swelling and neck pain.   Neurological:  Negative for weakness and headaches.   Psychiatric/Behavioral:  Negative for confusion and dysphoric mood.      Patient Active Problem List   Diagnosis    Prostate cancer    Atypical nevus    ADHD (attention deficit hyperactivity disorder)    CHEO (generalized anxiety disorder)    Varicose veins of both legs with edema    Controlled diabetes mellitus type 1 without complications       Past Medical History:   Diagnosis Date    ADHD (attention deficit hyperactivity disorder)     Allergy seasonal    Anxiety      Controlled diabetes mellitus type 1 without complications 4/4/2023    Depression     Family history of early CAD     brother with CAD age 49    Gilbert syndrome     History of psychiatric care     Prostate cancer prostate    bradytherapy    Psychiatric problem     Therapy        Past Surgical History:   Procedure Laterality Date    ANKLE LIGAMENT RECONSTRUCTION  2014    right    ARTHROSCOPY,SHOULDER,WITH BICEPS TENODESIS Left 2/17/2023    Procedure: ARTHROSCOPY,SHOULDER,WITH BICEPS TENODESIS;  Surgeon: Claude S. Williams IV, MD;  Location: [facility];  Service: Orthopedics;  Laterality: Left;    COLONOSCOPY N/A 5/12/2022    Procedure: COLONOSCOPY;  Surgeon: Sid Ko MD;  Location: [facility] ENDO (4TH FLR);  Service: Endoscopy;  Laterality: N/A;  fully vaccinated -   instructions emailed-     HERNIA REPAIR  1996    inguinal bilateral    PROSTATE SURGERY  2008    brachytherapy    ROTATOR CUFF REPAIR Left 2/17/2023    Procedure: REPAIR, ROTATOR CUFF;  Surgeon: Claude S. Williams IV, MD;  Location: [facility];  Service: Orthopedics;  Laterality: Left;    SHOULDER ARTHROSCOPY Left 2/17/2023    Procedure: ARTHROSCOPY, SHOULDER;  Surgeon: Claude S. Williams IV, MD;  Location: [facility];  Service: Orthopedics;  Laterality: Left;    testicle removed      complication of hernia repair    TUMOR REMOVAL  1996    nail bed       Family History   Problem Relation Age of Onset    Arthritis Mother     Skin cancer Mother     Asthma Brother     Alcohol abuse Maternal Grandfather     Alcohol abuse Paternal Grandmother     Skin cancer Maternal Grandmother     Macular degeneration Maternal Grandmother     Coronary artery disease Brother 49    Drug abuse Other     Glaucoma Neg Hx     Strabismus Neg Hx     Stroke Neg Hx     Blindness Neg Hx        Social History     Tobacco Use    Smoking status: Never    Smokeless tobacco: Never   Substance Use Topics    Alcohol use: Yes     Alcohol/week: 1.0 standard drink     Types: 1 Cans of beer per  week     Comment: less than one a week    Drug use: Never       Objective:   There were no vitals taken for this visit.     Physical Exam  Constitutional:       General: He is not in acute distress.     Appearance: He is well-developed. He is not diaphoretic.   HENT:      Head: Normocephalic and atraumatic.   Eyes:      General: No scleral icterus.        Right eye: No discharge.         Left eye: No discharge.   Pulmonary:      Effort: Pulmonary effort is normal. No respiratory distress.   Skin:     Coloration: Skin is not pale.      Findings: No erythema.   Neurological:      Mental Status: He is alert and oriented to person, place, and time.   Psychiatric:         Behavior: Behavior normal.         Thought Content: Thought content normal.         Prior labs reviewed  Assessment/Plan:       1. Controlled diabetes mellitus type 2 without complications  Overview: new diagnosis in process  One hba1c at 6.5 2/23  Recent surgery may have increased glucose numbers  Plan healthy diet, reduce sweets, cont exercise  Repeat in 6 mo to confirm diagnosis, start statin, 81 mg asa for now  Referred to diabetes education, completed  Assessment & Plan:  Follow up in 6 mo with hba1c, urine microalbumin prior    Orders:  -     Hemoglobin A1C; Future; Expected date: 10/01/2023  -     Microalbumin/Creatinine Ratio, Urine; Future; Expected date: 10/01/2023    Other orders  -     rosuvastatin (CRESTOR) 20 MG tablet; Take 1 tablet (20 mg total) by mouth every evening.  Dispense: 90 tablet; Refill: 3  -     aspirin (ECOTRIN) 81 MG EC tablet; Take 1 tablet (81 mg total) by mouth once daily.  Dispense: 90 tablet; Refill: 3            Visit type: Virtual visit with synchronous audio and video    Total time spent with patient:   17 minutes    Each patient to whom he or she provides medical services by telemedicine is:  (1) informed of the relationship between the physician and patient and the respective role of any other health care provider  with respect to management of the patient; and (2) notified that he or she may decline to receive medical services by telemedicine and may withdraw from such care at any time.  Medication List with Changes/Refills   New Medications    ASPIRIN (ECOTRIN) 81 MG EC TABLET    Take 1 tablet (81 mg total) by mouth once daily.    ROSUVASTATIN (CRESTOR) 20 MG TABLET    Take 1 tablet (20 mg total) by mouth every evening.   Current Medications    CETIRIZINE (ZYRTEC) 10 MG TABLET    Take 10 mg by mouth once daily.    DEXTROAMPHETAMINE-AMPHETAMINE (ADDERALL XR) 20 MG 24 HR CAPSULE    Take 20 mg by mouth every morning.    DEXTROAMPHETAMINE-AMPHETAMINE (ADDERALL XR) 30 MG 24 HR CAPSULE    Take 30 mg by mouth every morning.    FLUTICASONE PROPIONATE (FLONASE) 50 MCG/ACTUATION NASAL SPRAY    1 spray by Each Nostril route once daily.    SERTRALINE (ZOLOFT) 100 MG TABLET    Take 1 tablet (100 mg total) by mouth every evening.    TADALAFIL (CIALIS) 20 MG TAB    Take 1 tablet (20 mg total) by mouth every 72 hours as needed (ED). Take at least 2 hours before activity    TAMSULOSIN (FLOMAX) 0.4 MG CAP    Take 1 capsule (0.4 mg total) by mouth once daily.

## 2023-04-08 NOTE — PROGRESS NOTES
Diabetes Care Specialist Progress Note  Author: Eleanor Summers RD, CDE  Date: 4/3/2023    Program Intake  Reason for Diabetes Program Visit:: Initial Diabetes Assessment  Current diabetes risk level:: low  In the last 12 months, have you:: been admitted to a hospital  Was the ER or hospital admission related to diabetes?: No    Lab Results   Component Value Date    HGBA1C 6.5 (H) 03/30/2023       Clinical    Patient Health Rating  Compared to other people your age, how would you rate your health?: Excellent    Problem Review  Reviewed Problem List with Patient: yes  Active comorbidities affecting diabetes self-care.: no    Clinical Assessment  Current Diabetes Treatment: Diet, Exercise  Have you ever experienced hypoglycemia (low blood sugar)?: no  Have you ever experienced hyperglycemia (high blood sugar)?: no    Medication Information  How do you obtain your medications?: Patient drives  Medication adherence impacting ability to self-manage diabetes?: No    Labs  Do you have regular lab work to monitor your medications?: Yes  Type of Regular Lab Work: A1c, Cholesterol, Microalbumin  Where do you get your labs drawn?: [facility]  Lab Compliance Barriers: No    Nutritional Status  Diet: Regular  Meal Plan 24 Hour Recall: Breakfast, Lunch, Dinner, Snack  Meal Plan 24 Hour Recall - Breakfast: cheerios and natures path cereal with 2% milk, nuts, water  Meal Plan 24 Hour Recall - Lunch: cheese spread and triscuits, nuts, sparkling water  Meal Plan 24 Hour Recall - Dinner: fish or chicken, grilled vegetables, whole wheat pasta, water  Meal Plan 24 Hour Recall - Snack: in the past, would eat a lot of desserts - has stopped since dm dx  Change in appetite?: No  Dentation:: Intact  Recent Changes in Weight: No Recent Weight Change  Current nutritional status an area of need that is impacting patient's ability to self-manage diabetes?: No    Additional Social History    Support  Does anyone support you with your diabetes  care?: yes  Who supports you?: spouse  Who takes you to your medical appointments?: self  Does the current support meet the patient's needs?: Yes  Is Support an area impacting ability to self-manage diabetes?: No    Access to Mass Media & Technology  Does the patient have access to any of the following devices or technologies?: Smart phone, Home computer, Internet Access  Media or technology needs impacting ability to self-manage diabetes?: No    Cognitive/Behavioral Health  Alert and Oriented: Yes  Difficulty Thinking: No  Requires Prompting: No  Requires assistance for routine expression?: No  Cognitive or behavioral barriers impacting ability to self-manage diabetes?: No    Culture/Scientologist  Culture or Christianity beliefs that may impact ability to access healthcare: No    Communication  Language preference: English  Hearing Problems: No  Vision Problems: Yes  Vision problem type:: Decreased Vision  Vision Assistance: Glasses  Communication needs impacting ability to self-manage diabetes?: No    Health Literacy  Preferred Learning Method: Face to Face, Reading Materials  How often do you need to have someone help you read instructions, pamphlets, or written material from your doctor or pharmacy?: Never  Health literacy needs impacting ability to self-manage diabetes?: No      Diabetes Self-Management Skills Assessment    Diabetes Disease Process/Treatment Options  Patient/caregiver able to state what happens when someone has diabetes.: somewhat  Patient/caregiver knows what type of diabetes they have.: yes  Diabetes Type : Type II  Patient/caregiver able to identify at least three signs and symptoms of diabetes.: no  Patient able to identify at least three risk factors for diabetes.: no  Diabetes Disease Process/Treatment Options: Skills Assessment Completed: Yes  Assessment indicates:: Instruction Needed  Area of need?: Yes    Nutrition/Healthy Eating  Challenges to healthy eating:: other (see comments) (used to  eat desserts often (2 or more a day))  Method of carbohydrate measurement:: eyeballing/guessing  Patient can identify foods that impact blood sugar.: yes  Patient-identified foods:: starches (bread, pasta, rice, cereal), soda, sweets  Nutrition/Healthy Eating Skills Assessment Completed:: Yes  Assessment indicates:: Instruction Needed, Knowledge deficit  Area of need?: Yes    Physical Activity/Exercise  Patient's daily activity level:: moderately active  Patient formally exercises outside of work.: yes  Exercise Type: walking, other (see comments) (hiking)  Intensity: Moderate  Frequency: four or more times a week  Duration: 30 min  Patient can identify forms of physical activity.: yes  Stated forms of physical activity:: any movement performed by muscles that uses energy, recreational activities, moving to burn calories  Patient can identify reasons why exercise/physical activity is important in diabetes management.: yes  Identified reasons:: helps insulin work better, lowers blood glucose, blood pressure, and cholesterol, strengthens heart, muscles, and bones, relieves stress, improves blood circulation, keeps body and joints flexible, lowers risk of heart disease and stroke, tones muscles  Physical Activity/Exercise Skills Assessment Completed: : Yes  Assessment indicates:: Adequate understanding  Area of need?: No    Medications  Patient is able to describe current diabetes management routine.: yes  Diabetes management routine:: diet, exercise  Medication Skills Assessment Completed:: Yes  Assessment indicates:: Adequate understanding  Area of need?: No (not currently on any dm meds)    Home Blood Glucose Monitoring  Patient states that blood sugar is checked at home daily.: no  Reasons for not monitoring:: new diabetes diagnosis  Home Blood Glucose Monitoring Skills Assessment Completed: : No  Deferred due to:: Other (comment) (Pt not currently SMBG and does not have meter and supplies - A1C 6.5% - deferred for  now)    Acute Complications  Acute Complications Skills Assessment Completed: : No  Deffered due to:: Time  Area of need?: No    Chronic Complications  Chronic Complications Skills Assessment Completed: : No  Deferred due to:: Time    Psychosocial/Coping  Psychosocial/Coping Skills Assessment Completed: : No  Deffered due to:: Time          Assessment Summary and Plan    Based on today's diabetes care assessment, the following areas of need were identified:      Social 4/3/2023   Support No   Access to Mass Media/Tech No   Cognitive/Behavioral Health No   Culture/Latter day No   Communication No   Health Literacy No        Clinical 4/3/2023   Medication Adherence No   Lab Compliance No   Nutritional Status No        Diabetes Self-Management Skills 4/3/2023   Diabetes Disease Process/Treatment Options Yes -   Ed on what DM is, insulin resistance, beta cell burnout  Ed on importance of using lifestyle changes + appropriate medications to control BG and slow disease progression     Nutrition/Healthy Eating Yes - see care planning   Physical Activity/Exercise No   Medication No   Acute Complications No          Today's interventions were provided through individual discussion, instruction, and written materials were provided.      Patient verbalized understanding of instruction and written materials.  Pt was able to return back demonstration of instructions today. Patient understood key points, needs reinforcement and further instruction.     Diabetes Self-Management Care Plan:    Today's Diabetes Self-Management Care Plan was developed with Lewis's input. Lewis has agreed to work toward the following goal(s) to improve his/her overall diabetes control.      Care Plan: Diabetes Management   Updates made since 3/4/2023 12:00 AM        Problem: Healthy Eating         Goal: Eat 3 meals daily with 45-60g/3-4 servings of Carbohydrate per meal.    Start Date: 4/3/2023   Expected End Date: 7/3/2023   Priority: Medium  "  Barriers: No Barriers Identified   Note:    4/3/23 - Pt reports has a big "sweet tooth" and used to eat desserts after lunch and dinner every day. Since dm dx, has cut out sweets completely. Discussed okay to have a serving of dessert on occasion but limit day to day. Provided education on sources of CHO, choosing more whole fruit/vegetables/whole grains, portion sizes using dry measuring cups/food models/fist size for visual aid, plate method, and label reading. Discussed and reviewed examples of meal planning.        Task: Reviewed the sources and role of Carbohydrate, Protein, and Fat and how each nutrient impacts blood sugar. Completed 4/3/2023        Task: Provided visual examples using dry measuring cups, food models, and other familiar objects such as computer mouse, deck or cards, tennis ball etc. to help with visualization of portions. Completed 4/3/2023        Task: Explained how to count carbohydrates using the food label and the use of dry measuring cups for accurate carb counting. Completed 4/3/2023        Task: Discussed strategies for choosing healthier menu options when dining out. Completed 4/3/2023        Task: Review the importance of balancing carbohydrates with each meal using portion control techniques to count servings of carbohydrate and label reading to identify serving size and amount of total carbs per serving. Completed 4/3/2023        Task: Provided Sample plate method and reviewed the use of the plate to estimate amounts of carbohydrate per meal. Completed 4/3/2023          Follow Up Plan     Follow up in about 6 months (around 10/3/2023) for diabetes education follow-up - pt prefers to call when ready to schedule.    Today's care plan and follow up schedule was discussed with patient.  Lewis verbalized understanding of the care plan, goals, and agrees to follow up plan.        The patient was encouraged to communicate with his/her health care provider/physician and care team " regarding his/her condition(s) and treatment.  I provided the patient with my contact information today and encouraged to contact me via phone or [facility] Patient Portal as needed.     Length of Visit   Total Time: 60 Minutes      unable to assess

## 2023-05-13 ENCOUNTER — PATIENT MESSAGE (OUTPATIENT)
Dept: AUDIOLOGY | Facility: CLINIC | Age: 68
End: 2023-05-13
Payer: COMMERCIAL

## 2023-05-16 ENCOUNTER — PATIENT MESSAGE (OUTPATIENT)
Dept: DERMATOLOGY | Facility: CLINIC | Age: 68
End: 2023-05-16

## 2023-05-16 ENCOUNTER — E-VISIT (OUTPATIENT)
Dept: INTERNAL MEDICINE | Facility: CLINIC | Age: 68
End: 2023-05-16
Attending: INTERNAL MEDICINE
Payer: COMMERCIAL

## 2023-05-16 ENCOUNTER — PATIENT MESSAGE (OUTPATIENT)
Dept: INTERNAL MEDICINE | Facility: CLINIC | Age: 68
End: 2023-05-16

## 2023-05-16 DIAGNOSIS — L98.9 SKIN LESION: Primary | ICD-10-CM

## 2023-05-16 DIAGNOSIS — H91.90 HEARING LOSS, UNSPECIFIED HEARING LOSS TYPE, UNSPECIFIED LATERALITY: Primary | ICD-10-CM

## 2023-05-16 PROCEDURE — 99422 PR E&M, ONLINE DIGIT, EST, < 7 DAYS,  11-20 MINS: ICD-10-PCS | Mod: 95,,, | Performed by: INTERNAL MEDICINE

## 2023-05-16 PROCEDURE — 99422 OL DIG E/M SVC 11-20 MIN: CPT | Mod: 95,,, | Performed by: INTERNAL MEDICINE

## 2023-05-19 ENCOUNTER — CLINICAL SUPPORT (OUTPATIENT)
Dept: AUDIOLOGY | Facility: CLINIC | Age: 68
End: 2023-05-19
Payer: COMMERCIAL

## 2023-05-19 DIAGNOSIS — H91.90 HEARING LOSS, UNSPECIFIED HEARING LOSS TYPE, UNSPECIFIED LATERALITY: ICD-10-CM

## 2023-05-19 DIAGNOSIS — H91.93 BILATERAL HIGH FREQUENCY HEARING LOSS: Primary | ICD-10-CM

## 2023-05-19 PROCEDURE — 92567 TYMPANOMETRY: CPT | Mod: S$GLB,,, | Performed by: AUDIOLOGIST

## 2023-05-19 PROCEDURE — 99999 PR PBB SHADOW E&M-EST. PATIENT-LVL II: ICD-10-PCS | Mod: PBBFAC,,, | Performed by: AUDIOLOGIST

## 2023-05-19 PROCEDURE — 92557 COMPREHENSIVE HEARING TEST: CPT | Mod: S$GLB,,, | Performed by: AUDIOLOGIST

## 2023-05-19 PROCEDURE — 99999 PR PBB SHADOW E&M-EST. PATIENT-LVL II: CPT | Mod: PBBFAC,,, | Performed by: AUDIOLOGIST

## 2023-05-19 PROCEDURE — 92567 PR TYMPA2METRY: ICD-10-PCS | Mod: S$GLB,,, | Performed by: AUDIOLOGIST

## 2023-05-19 PROCEDURE — 92557 PR COMPREHENSIVE HEARING TEST: ICD-10-PCS | Mod: S$GLB,,, | Performed by: AUDIOLOGIST

## 2023-05-19 NOTE — PROGRESS NOTES
Lewis Fish, a 67 y.o. male, was seen today in the clinic for an audiologic evaluation.  The patient's main complaint was hearing loss.  Mr. Fish reported that he is unable to follow conversation when he is in a noisy situation.  He reported intermittent, non-bothersome tinnitus.  He denied otalgia, aural fullness and vertigo.    Tympanometry revealed Type A in the right ear and Type A in the left ear. Audiogram results revealed normal sloping to mild hearing loss in the right ear and normal sloping to mild hearing loss in the left ear.  Speech reception thresholds were noted at 10 dB in the right ear and 10 dB in the left ear.  Speech discrimination scores were 96% in the right ear and 100% in the left ear.    Test results were discussed at length.  Pt feels that his ADHD may be complicating his ability to hear in noise.  We discussed the use of hearing aids and it was decided that that is not a suitable option at this time.      Recommendations:  Otologic evaluation  Annual audiogram  Hearing protection when in noise

## 2023-05-19 NOTE — PROGRESS NOTES
Patient ID: Lewis Fish is a 67 y.o. male.    Chief Complaint: Mole    The patient initiated a request through [employer] on 5/16/2023 for evaluation and management with a chief complaint of Mole     I evaluated the questionnaire submission on 11.    Ohs Peq Evisit Rash    5/16/2023  3:49 PM CDT - Filed by Patient   Do you agree to participate in an E-Visit? Yes   If you have any of the following symptoms, please present to your local ER or call 911:  I acknowledge   What is the main issue that you would like for your doctor to address today? Assess unusual spot on arm. Dry, off-white in middle surrounded by red ring.   Are you able to take your vital signs? No   How would you describe your skin problem? Lump or bump   When did your symptoms first appear? 1/1/2023   Where is it located?  Arm(s)   Does it itch? No   Does it hurt? No   Is there discharge or drainage? No   Is there bleeding? No   Describe the character Flat;  Scaly;  Solid or firm   Describe the color Pink   Has it changed over time? No change   Frequency of skin problem Always there   Duration of the skin problem (how long does it stay when it is present) Never goes away   I have had a new exposure to No new exposures   What have you used to treat the skin problem? Nothing   If you have used anything for treatment, has it helped the symptoms? No   Other generalized symptoms that you associate with the rash No other symptoms   Provide any information you feel is important to your history not asked above    At least one photo is required for treatment to be provided. You can upload a maximum of three photos of the affected area.           Recent Labs Obtained:  No visits with results within 7 Day(s) from this visit.   Latest known visit with results is:   Lab Visit on 03/30/2023   Component Date Value Ref Range Status    Cholesterol 03/30/2023 160  120 - 199 mg/dL Final    Comment: The National Cholesterol Education Program (NCEP) has set  the  following guidelines (reference ranges) for Cholesterol:  Optimal.....................<200 mg/dL  Borderline High.............200-239 mg/dL  High........................> or = 240 mg/dL      Triglycerides 03/30/2023 119  30 - 150 mg/dL Final    Comment: The National Cholesterol Education Program (NCEP) has set the  following guidelines (reference values) for triglycerides:  Normal......................<150 mg/dL  Borderline High.............150-199 mg/dL  High........................200-499 mg/dL      HDL 03/30/2023 39 (L)  40 - 75 mg/dL Final    Comment: The National Cholesterol Education Program (NCEP) has set the  following guidelines (reference values) for HDL Cholesterol:  Low...............<40 mg/dL  Optimal...........>60 mg/dL      LDL Cholesterol 03/30/2023 97.2  63.0 - 159.0 mg/dL Final    Comment: The National Cholesterol Education Program (NCEP) has set the  following guidelines (reference values) for LDL Cholesterol:  Optimal.......................<130 mg/dL  Borderline High...............130-159 mg/dL  High..........................160-189 mg/dL  Very High.....................>190 mg/dL      HDL/Cholesterol Ratio 03/30/2023 24.4  20.0 - 50.0 % Final    Total Cholesterol/HDL Ratio 03/30/2023 4.1  2.0 - 5.0 Final    Non-HDL Cholesterol 03/30/2023 121  mg/dL Final    Comment: Risk category and Non-HDL cholesterol goals:  Coronary heart disease (CHD)or equivalent (10-year risk of CHD >20%):  Non-HDL cholesterol goal     <130 mg/dL  Two or more CHD risk factors and 10-year risk of CHD <= 20%:  Non-HDL cholesterol goal     <160 mg/dL  0 to 1 CHD risk factor:  Non-HDL cholesterol goal     <190 mg/dL      WBC 03/30/2023 4.41  3.90 - 12.70 K/uL Final    RBC 03/30/2023 5.11  4.60 - 6.20 M/uL Final    Hemoglobin 03/30/2023 14.9  14.0 - 18.0 g/dL Final    Hematocrit 03/30/2023 42.6  40.0 - 54.0 % Final    MCV 03/30/2023 83  82 - 98 fL Final    MCH 03/30/2023 29.2  27.0 - 31.0 pg Final    MCHC 03/30/2023 35.0   32.0 - 36.0 g/dL Final    RDW 03/30/2023 13.4  11.5 - 14.5 % Final    Platelets 03/30/2023 144 (L)  150 - 450 K/uL Final    MPV 03/30/2023 8.6 (L)  9.2 - 12.9 fL Final    Immature Granulocytes 03/30/2023 0.7 (H)  0.0 - 0.5 % Final    Gran # (ANC) 03/30/2023 2.7  1.8 - 7.7 K/uL Final    Immature Grans (Abs) 03/30/2023 0.03  0.00 - 0.04 K/uL Final    Comment: Mild elevation in immature granulocytes is non specific and   can be seen in a variety of conditions including stress response,   acute inflammation, trauma and pregnancy. Correlation with other   laboratory and clinical findings is essential.      Lymph # 03/30/2023 1.3  1.0 - 4.8 K/uL Final    Mono # 03/30/2023 0.3  0.3 - 1.0 K/uL Final    Eos # 03/30/2023 0.1  0.0 - 0.5 K/uL Final    Baso # 03/30/2023 0.02  0.00 - 0.20 K/uL Final    nRBC 03/30/2023 0  0 /100 WBC Final    Gran % 03/30/2023 60.7  38.0 - 73.0 % Final    Lymph % 03/30/2023 30.4  18.0 - 48.0 % Final    Mono % 03/30/2023 6.3  4.0 - 15.0 % Final    Eosinophil % 03/30/2023 1.4  0.0 - 8.0 % Final    Basophil % 03/30/2023 0.5  0.0 - 1.9 % Final    Differential Method 03/30/2023 Automated   Final    Sodium 03/30/2023 141  136 - 145 mmol/L Final    Potassium 03/30/2023 4.3  3.5 - 5.1 mmol/L Final    Chloride 03/30/2023 108  95 - 110 mmol/L Final    CO2 03/30/2023 26  23 - 29 mmol/L Final    Glucose 03/30/2023 136 (H)  70 - 110 mg/dL Final    BUN 03/30/2023 22  8 - 23 mg/dL Final    Creatinine 03/30/2023 1.4  0.5 - 1.4 mg/dL Final    Calcium 03/30/2023 9.3  8.7 - 10.5 mg/dL Final    Total Protein 03/30/2023 6.6  6.0 - 8.4 g/dL Final    Albumin 03/30/2023 4.3  3.5 - 5.2 g/dL Final    Total Bilirubin 03/30/2023 2.5 (H)  0.1 - 1.0 mg/dL Final    Comment: For infants and newborns, interpretation of results should be based  on gestational age, weight and in agreement with clinical  observations.    Premature Infant recommended reference ranges:  Up to 24 hours.............<8.0 mg/dL  Up to 48  hours............<12.0 mg/dL  3-5 days..................<15.0 mg/dL  6-29 days.................<15.0 mg/dL      Alkaline Phosphatase 2023 69  55 - 135 U/L Final    AST 2023 16  10 - 40 U/L Final    ALT 2023 17  10 - 44 U/L Final    Anion Gap 2023 7 (L)  8 - 16 mmol/L Final    eGFR 2023 56 (A)  >60 mL/min/1.73 m^2 Final    Hemoglobin A1C 2023 6.5 (H)  4.0 - 5.6 % Final    Comment: ADA Screening Guidelines:  5.7-6.4%  Consistent with prediabetes  >or=6.5%  Consistent with diabetes    High levels of fetal hemoglobin interfere with the HbA1C  assay. Heterozygous hemoglobin variants (HbS, HgC, etc)do  not significantly interfere with this assay.   However, presence of multiple variants may affect accuracy.      Estimated Avg Glucose 2023 140 (H)  68 - 131 mg/dL Final       Encounter Diagnosis   Name Primary?    Skin lesion Yes        Orders Placed This Encounter   Procedures    E-Consult to Dermatology     See details from coby- new lesion on forearm first noted in January.     Order Specific Question:   Verbal consent has been obtained from patient, and patient is aware of applicable cost? Pending specialist evaluation, I will follow up with the patient.     Answer:   Yes     Order Specific Question:   Medical Condition:     Answer:   Skin Lesion     Order Specific Question:   What is your clinical question?     Answer:   In what time frame does this mole need to be seen in dermatology if at all.     Order Specific Question:   Total time spent preparing for the referral and/or communicating with the consulting physician:     Answer:   <16 minutes: no billing code should be submitted     Order Specific Question:   Medical Condition:     Answer:   new mole- image in recent e-visit with internal medicine            No follow-ups on file.      E-Visit Time [id number]

## 2023-06-02 ENCOUNTER — HOSPITAL ENCOUNTER (OUTPATIENT)
Dept: RADIOLOGY | Facility: OTHER | Age: 68
Discharge: HOME | End: 2023-06-02
Attending: SURGERY
Payer: COMMERCIAL

## 2023-06-02 ENCOUNTER — OFFICE VISIT (OUTPATIENT)
Dept: VASCULAR SURGERY | Facility: CLINIC | Age: 68
End: 2023-06-02
Payer: COMMERCIAL

## 2023-06-02 VITALS
SYSTOLIC BLOOD PRESSURE: 132 MMHG | HEART RATE: 75 BPM | HEIGHT: 74 IN | BODY MASS INDEX: 26.05 KG/M2 | WEIGHT: 203 LBS | DIASTOLIC BLOOD PRESSURE: 72 MMHG

## 2023-06-02 DIAGNOSIS — I83.893 VARICOSE VEINS OF BOTH LEGS WITH EDEMA: ICD-10-CM

## 2023-06-02 DIAGNOSIS — I87.2 VENOUS INSUFFICIENCY OF BOTH LOWER EXTREMITIES: Primary | ICD-10-CM

## 2023-06-02 DIAGNOSIS — I83.893 VARICOSE VEINS OF LEG WITH EDEMA, BILATERAL: ICD-10-CM

## 2023-06-02 PROCEDURE — 99203 PR OFFICE/OUTPT VISIT, NEW, LEVL III, 30-44 MIN: ICD-10-PCS | Mod: S$GLB,,, | Performed by: SURGERY

## 2023-06-02 PROCEDURE — 99203 OFFICE O/P NEW LOW 30 MIN: CPT | Mod: S$GLB,,, | Performed by: SURGERY

## 2023-06-02 PROCEDURE — 93970 EXTREMITY STUDY: CPT | Mod: TC

## 2023-06-02 PROCEDURE — 93970 US LOWER EXTREMITY VEINS BILATERAL INSUFFICIENCY: ICD-10-PCS | Mod: 26,,, | Performed by: RADIOLOGY

## 2023-06-02 PROCEDURE — 93970 EXTREMITY STUDY: CPT | Mod: 26,,, | Performed by: RADIOLOGY

## 2023-06-02 RX ORDER — SILDENAFIL 100 MG/1
100 TABLET, FILM COATED ORAL
COMMUNITY
Start: 2023-05-11 | End: 2023-10-04

## 2023-06-02 NOTE — PROGRESS NOTES
Robert Kasper MD, RPVI                                 [facility]                           [facility]                             06/02/2023    HPI:  Lewis Fish is a 67 y.o. male with   Patient Active Problem List   Diagnosis    Prostate cancer    Atypical nevus    ADHD (attention deficit hyperactivity disorder)    CHEO (generalized anxiety disorder)    Varicose veins of both legs with edema    Controlled diabetes mellitus type 1 without complications    being managed by PCP and specialists who is here today for evaluation of BLE edema and varicose veins.  Patient states location is BLE occurring for yrs.  Associated signs and symptoms include none. Improving factors include elevation.  Worsening factors include dependency.  Patient has not been wearing compression stockings for greater than 3 months.   Symptoms do not limit patient's functional status and daily activities.  no DVT history.  no venous interventions.  no low sodium diet.  no excessive water intake.    Migraine with aura: no  PFO/ASD/right to left shunt: no  Pregnant: no  Breastfeeding: no    no MI  no Stroke  Tobacco use: denies    Past Medical History:   Diagnosis Date    ADHD (attention deficit hyperactivity disorder)     Allergy seasonal    Anxiety     Controlled diabetes mellitus type 1 without complications 4/4/2023    Depression     Family history of early CAD     brother with CAD age 49    Gilbert syndrome     History of psychiatric care     Prostate cancer prostate    bradytherapy    Psychiatric problem     Therapy      Past Surgical History:   Procedure Laterality Date    ANKLE LIGAMENT RECONSTRUCTION  2014    right    ARTHROSCOPY,SHOULDER,WITH BICEPS TENODESIS Left 2/17/2023    Procedure: ARTHROSCOPY,SHOULDER,WITH BICEPS TENODESIS;  Surgeon: Claude S. Williams IV, MD;  Location: [facility];  Service: Orthopedics;  Laterality: Left;    COLONOSCOPY N/A 5/12/2022    Procedure: COLONOSCOPY;  Surgeon:  Sid Ko MD;  Location: [facility] ENDO (4TH FLR);  Service: Endoscopy;  Laterality: N/A;  fully vaccinated -   instructions emailed-     HERNIA REPAIR  1996    inguinal bilateral    PROSTATE SURGERY  2008    brachytherapy    ROTATOR CUFF REPAIR Left 2/17/2023    Procedure: REPAIR, ROTATOR CUFF;  Surgeon: Claude S. Williams IV, MD;  Location: [facility];  Service: Orthopedics;  Laterality: Left;    SHOULDER ARTHROSCOPY Left 2/17/2023    Procedure: ARTHROSCOPY, SHOULDER;  Surgeon: Claude S. Williams IV, MD;  Location: [facility];  Service: Orthopedics;  Laterality: Left;    testicle removed      complication of hernia repair    TUMOR REMOVAL  1996    nail bed     Family History   Problem Relation Age of Onset    Arthritis Mother     Skin cancer Mother     Asthma Brother     Alcohol abuse Maternal Grandfather     Alcohol abuse Paternal Grandmother     Skin cancer Maternal Grandmother     Macular degeneration Maternal Grandmother     Coronary artery disease Brother 49    Drug abuse Other     Glaucoma Neg Hx     Strabismus Neg Hx     Stroke Neg Hx     Blindness Neg Hx      Social History     Socioeconomic History    Marital status:      Spouse name: Cecilia    Number of children: 3   Occupational History    Occupation:    Tobacco Use    Smoking status: Never    Smokeless tobacco: Never   Substance and Sexual Activity    Alcohol use: Yes     Alcohol/week: 1.0 standard drink     Types: 1 Cans of beer per week     Comment: less than one a week    Drug use: Never    Sexual activity: Yes     Partners: Female     Birth control/protection: Post-menopausal, See Surgical Hx   Other Topics Concern    Patient feels they ought to cut down on drinking/drug use No    Patient annoyed by others criticizing their drinking/drug use No    Patient has felt bad or guilty about drinking/drug use No    Patient has had a drink/used drugs as an eye opener in the AM No   Social History Narrative    Walking and cross training  5-6 times weekly. Lives w wife, 3 kids grown.    Works @ [employer] as .          Lives with wife Cecilia, his 28 yo son Anshul, and two cats.       Current Outpatient Medications:     aspirin (ECOTRIN) 81 MG EC tablet, Take 1 tablet (81 mg total) by mouth once daily., Disp: 90 tablet, Rfl: 3    cetirizine (ZYRTEC) 10 MG tablet, Take 10 mg by mouth once daily., Disp: , Rfl:     dextroamphetamine-amphetamine (ADDERALL XR) 20 MG 24 hr capsule, Take 20 mg by mouth every morning., Disp: , Rfl:     dextroamphetamine-amphetamine (ADDERALL XR) 30 MG 24 hr capsule, Take 30 mg by mouth every morning., Disp: , Rfl:     fluticasone propionate (FLONASE) 50 mcg/actuation nasal spray, 1 spray by Each Nostril route once daily., Disp: , Rfl:     rosuvastatin (CRESTOR) 20 MG tablet, Take 1 tablet (20 mg total) by mouth every evening., Disp: 90 tablet, Rfl: 3    sertraline (ZOLOFT) 100 MG tablet, Take 1 tablet (100 mg total) by mouth every evening., Disp: 90 tablet, Rfl: 0    sildenafiL (VIAGRA) 100 MG tablet, Take 100 mg by mouth., Disp: , Rfl:     tadalafiL (CIALIS) 20 MG Tab, Take 1 tablet (20 mg total) by mouth every 72 hours as needed (ED). Take at least 2 hours before activity, Disp: 30 tablet, Rfl: 11    tamsulosin (FLOMAX) 0.4 mg Cap, Take 1 capsule (0.4 mg total) by mouth once daily., Disp: 30 capsule, Rfl: 11    REVIEW OF SYSTEMS:  General: No fevers or chills; ENT: No sore throat; Allergy and Immunology: no persistent infections; Hematological and Lymphatic: No history of bleeding or easy bruising; Endocrine: negative; Respiratory: no cough, shortness of breath, or wheezing; Cardiovascular: no chest pain or dyspnea on exertion; Gastrointestinal: no abdominal pain/back, change in bowel habits, or bloody stools; Genito-Urinary: no dysuria, trouble voiding, or hematuria; Musculoskeletal: edema; Neurological: no TIA or stroke symptoms; Psychiatric: no nervousness, anxiety or depression.    PHYSICAL EXAM:      Pulse: 75          General appearance:  Alert, well-appearing, and in no distress.  Oriented to person, place, and time                    Neurological: Normal speech, no focal findings noted; CN II - XII grossly intact. RLE with sensation to light touch, LLE with sensation to light touch.            Musculoskeletal: Digits/nail without cyanosis/clubbing.  Strength 5/5 BLE.                    Neck: Supple, no significant adenopathy                  Chest:  No wheezes, symmetric air entry. No use of accessory muscles               Cardiac: Normal rate and regular rhythm            Abdomen: Soft, nontender, nondistended      Extremities:   2+ R DP pulse, 2+ L DP pulse      1+ RLE edema, 1+ LLE edema    Skin:  RLE no ulcer; LLE no ulcer      RLE + spider veins, LLE + spider veins      RLE + varicose veins, LLE + varicose veins    CEAP 3/3    VCSS 4    LAB RESULTS:  No results found for: CBC  No results found for: LABPROT, INR  Lab Results   Component Value Date     03/30/2023    K 4.3 03/30/2023     03/30/2023    CO2 26 03/30/2023     (H) 03/30/2023    BUN 22 03/30/2023    CREATININE 1.4 03/30/2023    CALCIUM 9.3 03/30/2023    ANIONGAP 7 (L) 03/30/2023    EGFRNONAA 53 (A) 01/07/2022     Lab Results   Component Value Date    WBC 4.41 03/30/2023    RBC 5.11 03/30/2023    HGB 14.9 03/30/2023    HCT 42.6 03/30/2023    MCV 83 03/30/2023    MCH 29.2 03/30/2023    MCHC 35.0 03/30/2023    RDW 13.4 03/30/2023     (L) 03/30/2023    MPV 8.6 (L) 03/30/2023    GRAN 2.7 03/30/2023    GRAN 60.7 03/30/2023    LYMPH 1.3 03/30/2023    LYMPH 30.4 03/30/2023    MONO 0.3 03/30/2023    MONO 6.3 03/30/2023    EOS 0.1 03/30/2023    BASO 0.02 03/30/2023    EOSINOPHIL 1.4 03/30/2023    BASOPHIL 0.5 03/30/2023    DIFFMETHOD Automated 03/30/2023     .  Lab Results   Component Value Date    HGBA1C 6.5 (H) 03/30/2023       IMAGING:  All pertinent imaging has been reviewed and interpreted independently.    Venous US 5/2023  Impression:  EXAMINATION:  US LOWER EXTREMITY VEINS BILATERAL INSUFFICIENCY     CLINICAL HISTORY:  Varicose veins of bilateral lower extremities with other complications     FINDINGS:  All veins demonstrate normal flow, compressibility, and no reflux.  No popliteal fossa mass, cyst, or aneurysm seen.     Impression:     No DVT seen bilaterally.        Electronically signed by: Maurisio Kebede MD  Date:                                            06/02/2023  Time:                                           12:28    IMP/PLAN:  67 y.o. male with   Patient Active Problem List   Diagnosis    Prostate cancer    Atypical nevus    ADHD (attention deficit hyperactivity disorder)    CHEO (generalized anxiety disorder)    Varicose veins of both legs with edema    Controlled diabetes mellitus type 1 without complications    being managed by PCP and specialists who is here today for evaluation of BLE varicose veins and edema, asymptomatic.    -recommend compression with Rx stockings, elevation, dietary changes associated with water and sodium intake discussed at length with patient  -Exercise   -Recommend warm compresses, NSAID and elevation for thrombophlebitis prn  -RTC 6 months for further evaluation    I spent 11 minutes evaluating this patient and greater than 50% of the time was spent counseling, coordinator care and discussing the plan of care.  All questions were answered and patient stated understanding with agreement with the above treatment plan.    Robert Kasper MD [facility]  Vascular and Endovascular Surgery

## 2023-06-27 ENCOUNTER — PATIENT MESSAGE (OUTPATIENT)
Dept: INTERNAL MEDICINE | Facility: CLINIC | Age: 68
End: 2023-06-27
Payer: COMMERCIAL

## 2023-07-17 ENCOUNTER — PATIENT MESSAGE (OUTPATIENT)
Dept: INTERNAL MEDICINE | Facility: CLINIC | Age: 68
End: 2023-07-17
Payer: COMMERCIAL

## 2023-07-20 ENCOUNTER — OFFICE VISIT (OUTPATIENT)
Dept: INTERNAL MEDICINE | Facility: CLINIC | Age: 68
End: 2023-07-20
Attending: INTERNAL MEDICINE
Payer: COMMERCIAL

## 2023-07-20 VITALS
HEART RATE: 76 BPM | OXYGEN SATURATION: 100 % | DIASTOLIC BLOOD PRESSURE: 67 MMHG | SYSTOLIC BLOOD PRESSURE: 101 MMHG | BODY MASS INDEX: 25.15 KG/M2 | WEIGHT: 195.88 LBS

## 2023-07-20 DIAGNOSIS — D22.9 ATYPICAL NEVUS: ICD-10-CM

## 2023-07-20 DIAGNOSIS — N18.31 STAGE 3A CHRONIC KIDNEY DISEASE: ICD-10-CM

## 2023-07-20 DIAGNOSIS — L72.0 EPIDERMOID CYST: Primary | ICD-10-CM

## 2023-07-20 DIAGNOSIS — E11.9 TYPE 2 DIABETES MELLITUS WITHOUT COMPLICATION, WITHOUT LONG-TERM CURRENT USE OF INSULIN: ICD-10-CM

## 2023-07-20 PROBLEM — R73.03 PREDIABETES: Status: ACTIVE | Noted: 2023-04-04

## 2023-07-20 PROCEDURE — 99999 PR PBB SHADOW E&M-EST. PATIENT-LVL IV: ICD-10-PCS | Mod: PBBFAC,,, | Performed by: INTERNAL MEDICINE

## 2023-07-20 PROCEDURE — 99214 OFFICE O/P EST MOD 30 MIN: CPT | Mod: S$GLB,,, | Performed by: INTERNAL MEDICINE

## 2023-07-20 PROCEDURE — 99214 PR OFFICE/OUTPT VISIT, EST, LEVL IV, 30-39 MIN: ICD-10-PCS | Mod: S$GLB,,, | Performed by: INTERNAL MEDICINE

## 2023-07-20 PROCEDURE — 99999 PR PBB SHADOW E&M-EST. PATIENT-LVL IV: CPT | Mod: PBBFAC,,, | Performed by: INTERNAL MEDICINE

## 2023-07-20 RX ORDER — METFORMIN HYDROCHLORIDE 500 MG/1
500 TABLET ORAL
Qty: 90 TABLET | Refills: 3 | Status: SHIPPED | OUTPATIENT
Start: 2023-07-20 | End: 2024-07-19

## 2023-07-20 NOTE — PROGRESS NOTES
Received lab reports dated 7/11/23 from pt employer. Made copies, gave originals to pt and sent copies to scan.

## 2023-07-20 NOTE — PROGRESS NOTES
Subjective:       Patient ID: Lewis Fish is a 67 y.o. male.    Chief Complaint: Cyst (Posterior r shoulder)     Lewis Fish is a 67 y.o.  male who presents for Cyst (Posterior r shoulder)  .  Has had cysts before, removed. This one started mos ago, growing rapidly now. Would like it removed.  No follow up regarding lesion on his forearm, looks better today  Has changed diet, restricted carbs. Labs showing hba1c of 5.8. saw nutritionist.     Has area on forearm, unchanged, concerned.     Recently diagnosed with diabetes. Saw dietician, has been following diabetic diet, exercing and losing wt. Has had significant improvement in hba1c.  Still above normal.    Cyst  Pertinent negatives include no chest pain, chills, coughing, fever, nausea or vomiting.     Review of Systems   Constitutional:  Negative for chills and fever.   Respiratory:  Negative for cough and shortness of breath.    Cardiovascular:  Negative for chest pain and palpitations.   Gastrointestinal:  Negative for nausea and vomiting.   Genitourinary:  Negative for dysuria and hematuria.     Patient Active Problem List   Diagnosis    Prostate cancer    Atypical nevus    ADHD (attention deficit hyperactivity disorder)    CHEO (generalized anxiety disorder)    Varicose veins of both legs with edema    Prediabetes     PSA wnl    Past Medical History:   Diagnosis Date    ADHD (attention deficit hyperactivity disorder)     Allergy seasonal    Anxiety     Controlled diabetes mellitus type 1 without complications 4/4/2023    Depression     Family history of early CAD     brother with CAD age 49    Gilbert syndrome     History of psychiatric care     Prostate cancer prostate    bradytherapy    Psychiatric problem     Therapy        Past Surgical History:   Procedure Laterality Date    ANKLE LIGAMENT RECONSTRUCTION  2014    right    ARTHROSCOPY,SHOULDER,WITH BICEPS TENODESIS Left 2/17/2023    Procedure: ARTHROSCOPY,SHOULDER,WITH BICEPS  TENODESIS;  Surgeon: Claude S. Williams IV, MD;  Location: [facility];  Service: Orthopedics;  Laterality: Left;    COLONOSCOPY N/A 5/12/2022    Procedure: COLONOSCOPY;  Surgeon: Sid Ko MD;  Location: [facility] ENDO (4TH FLR);  Service: Endoscopy;  Laterality: N/A;  fully vaccinated -   instructions emailed-     HERNIA REPAIR  1996    inguinal bilateral    PROSTATE SURGERY  2008    brachytherapy    ROTATOR CUFF REPAIR Left 2/17/2023    Procedure: REPAIR, ROTATOR CUFF;  Surgeon: Claude S. Williams IV, MD;  Location: [facility];  Service: Orthopedics;  Laterality: Left;    SHOULDER ARTHROSCOPY Left 2/17/2023    Procedure: ARTHROSCOPY, SHOULDER;  Surgeon: Claude S. Williams IV, MD;  Location: [facility];  Service: Orthopedics;  Laterality: Left;    testicle removed      complication of hernia repair    TUMOR REMOVAL  1996    nail bed       Family History   Problem Relation Age of Onset    Arthritis Mother     Skin cancer Mother     Asthma Brother     Alcohol abuse Maternal Grandfather     Alcohol abuse Paternal Grandmother     Skin cancer Maternal Grandmother     Macular degeneration Maternal Grandmother     Coronary artery disease Brother 49    Drug abuse Other     Glaucoma Neg Hx     Strabismus Neg Hx     Stroke Neg Hx     Blindness Neg Hx        Social History     Tobacco Use    Smoking status: Never    Smokeless tobacco: Never   Substance Use Topics    Alcohol use: Yes     Alcohol/week: 1.0 standard drink     Types: 1 Cans of beer per week     Comment: less than one a week    Drug use: Never       Objective:   Blood pressure 101/67, pulse 76, weight 88.9 kg (195 lb 14.1 oz), SpO2 100 %.     Physical Exam  Constitutional:       Appearance: Normal appearance.   Skin:     Comments: Epidermoid cysts on right shoulder   Neurological:      Mental Status: He is alert.         Prior labs reviewed  Assessment/Plan:       1. Epidermoid cyst  -     Ambulatory referral/consult to Dermatology; Future;  Expected date: 07/27/2023    2. Atypical nevus  Overview:  Precancerous tumor of nailbed, removed 1996    Orders:  -     Ambulatory referral/consult to Dermatology; Future; Expected date: 07/27/2023    3. Type 2 diabetes mellitus without complication, without long-term current use of insulin  Comments:  improved with diet and exercise  start metformin 500 mg q am  check labs on rtc  Overview:  One hba1c at 6.5 2/23  Recent surgery may have increased glucose numbers  followed healthy diet, reduced sweets, cont'd exercise  7/23 labs at work hba1c now 5.8  Cont healthy lifestyle, start metformin        Assessment & Plan:  Keep plan to repeat labs in 3 mo       4. Stage 3a chronic kidney disease  -     Comprehensive Metabolic Panel; Future; Expected date: 10/20/2023    Other orders  -     metFORMIN (GLUCOPHAGE) 500 MG tablet; Take 1 tablet (500 mg total) by mouth daily with breakfast.  Dispense: 90 tablet; Refill: 3         Medication List with Changes/Refills   New Medications    METFORMIN (GLUCOPHAGE) 500 MG TABLET    Take 1 tablet (500 mg total) by mouth daily with breakfast.   Current Medications    ASPIRIN (ECOTRIN) 81 MG EC TABLET    Take 1 tablet (81 mg total) by mouth once daily.    CETIRIZINE (ZYRTEC) 10 MG TABLET    Take 10 mg by mouth once daily.    DEXTROAMPHETAMINE-AMPHETAMINE (ADDERALL XR) 20 MG 24 HR CAPSULE    Take 20 mg by mouth every morning.    DEXTROAMPHETAMINE-AMPHETAMINE (ADDERALL XR) 30 MG 24 HR CAPSULE    Take 30 mg by mouth every morning.    FLUTICASONE PROPIONATE (FLONASE) 50 MCG/ACTUATION NASAL SPRAY    1 spray by Each Nostril route once daily.    ROSUVASTATIN (CRESTOR) 20 MG TABLET    Take 1 tablet (20 mg total) by mouth every evening.    SERTRALINE (ZOLOFT) 100 MG TABLET    Take 1 tablet (100 mg total) by mouth every evening.    SILDENAFIL (VIAGRA) 100 MG TABLET    Take 100 mg by mouth.    TADALAFIL (CIALIS) 20 MG TAB    Take 1 tablet (20 mg total) by mouth every 72 hours as needed (ED).  Take at least 2 hours before activity    TAMSULOSIN (FLOMAX) 0.4 MG CAP    Take 1 capsule (0.4 mg total) by mouth once daily.

## 2023-09-25 ENCOUNTER — LAB VISIT (OUTPATIENT)
Dept: LAB | Facility: OTHER | Age: 68
End: 2023-09-25
Attending: INTERNAL MEDICINE
Payer: COMMERCIAL

## 2023-09-25 DIAGNOSIS — E11.9 CONTROLLED TYPE 2 DIABETES MELLITUS WITHOUT COMPLICATION, WITHOUT LONG-TERM CURRENT USE OF INSULIN: ICD-10-CM

## 2023-09-25 LAB
ALBUMIN/CREAT UR: 3.9 UG/MG (ref 0–30)
CREAT UR-MCNC: 127.2 MG/DL (ref 23–375)
ESTIMATED AVG GLUCOSE: 111 MG/DL (ref 68–131)
HBA1C MFR BLD: 5.5 % (ref 4–5.6)
MICROALBUMIN UR DL<=1MG/L-MCNC: 5 UG/ML

## 2023-09-25 PROCEDURE — 36415 COLL VENOUS BLD VENIPUNCTURE: CPT | Performed by: INTERNAL MEDICINE

## 2023-09-25 PROCEDURE — 82043 UR ALBUMIN QUANTITATIVE: CPT | Performed by: INTERNAL MEDICINE

## 2023-09-25 PROCEDURE — 83036 HEMOGLOBIN GLYCOSYLATED A1C: CPT | Performed by: INTERNAL MEDICINE

## 2023-09-27 DIAGNOSIS — E11.9 TYPE 2 DIABETES MELLITUS WITHOUT COMPLICATION, UNSPECIFIED WHETHER LONG TERM INSULIN USE: ICD-10-CM

## 2023-10-02 ENCOUNTER — LAB VISIT (OUTPATIENT)
Dept: LAB | Facility: OTHER | Age: 68
End: 2023-10-02
Attending: INTERNAL MEDICINE
Payer: COMMERCIAL

## 2023-10-02 DIAGNOSIS — N18.31 STAGE 3A CHRONIC KIDNEY DISEASE: ICD-10-CM

## 2023-10-02 LAB
ALBUMIN SERPL BCP-MCNC: 4.1 G/DL (ref 3.5–5.2)
ALP SERPL-CCNC: 52 U/L (ref 55–135)
ALT SERPL W/O P-5'-P-CCNC: 19 U/L (ref 10–44)
ANION GAP SERPL CALC-SCNC: 8 MMOL/L (ref 8–16)
AST SERPL-CCNC: 19 U/L (ref 10–40)
BILIRUB SERPL-MCNC: 1.2 MG/DL (ref 0.1–1)
BUN SERPL-MCNC: 26 MG/DL (ref 8–23)
CALCIUM SERPL-MCNC: 9.1 MG/DL (ref 8.7–10.5)
CHLORIDE SERPL-SCNC: 113 MMOL/L (ref 95–110)
CO2 SERPL-SCNC: 20 MMOL/L (ref 23–29)
CREAT SERPL-MCNC: 1.4 MG/DL (ref 0.5–1.4)
EST. GFR  (NO RACE VARIABLE): 56 ML/MIN/1.73 M^2
GLUCOSE SERPL-MCNC: 106 MG/DL (ref 70–110)
POTASSIUM SERPL-SCNC: 3.6 MMOL/L (ref 3.5–5.1)
PROT SERPL-MCNC: 6.3 G/DL (ref 6–8.4)
SODIUM SERPL-SCNC: 141 MMOL/L (ref 136–145)

## 2023-10-02 PROCEDURE — 80053 COMPREHEN METABOLIC PANEL: CPT | Performed by: INTERNAL MEDICINE

## 2023-10-02 PROCEDURE — 36415 COLL VENOUS BLD VENIPUNCTURE: CPT | Performed by: INTERNAL MEDICINE

## 2023-10-04 ENCOUNTER — OFFICE VISIT (OUTPATIENT)
Dept: INTERNAL MEDICINE | Facility: CLINIC | Age: 68
End: 2023-10-04
Attending: INTERNAL MEDICINE
Payer: COMMERCIAL

## 2023-10-04 DIAGNOSIS — E11.9 ENCOUNTER FOR DIABETES TYPE 2 EYE EXAM: ICD-10-CM

## 2023-10-04 DIAGNOSIS — L98.9 ARM SKIN LESION, RIGHT: ICD-10-CM

## 2023-10-04 DIAGNOSIS — E11.9 CONTROLLED TYPE 2 DIABETES MELLITUS WITHOUT COMPLICATION, WITHOUT LONG-TERM CURRENT USE OF INSULIN: Primary | ICD-10-CM

## 2023-10-04 DIAGNOSIS — N18.31 STAGE 3A CHRONIC KIDNEY DISEASE: ICD-10-CM

## 2023-10-04 DIAGNOSIS — Z01.00 ENCOUNTER FOR DIABETES TYPE 2 EYE EXAM: ICD-10-CM

## 2023-10-04 DIAGNOSIS — C61 PROSTATE CANCER: ICD-10-CM

## 2023-10-04 DIAGNOSIS — D22.9 ATYPICAL NEVUS: ICD-10-CM

## 2023-10-04 PROCEDURE — 99214 PR OFFICE/OUTPT VISIT, EST, LEVL IV, 30-39 MIN: ICD-10-PCS | Mod: 95,,, | Performed by: INTERNAL MEDICINE

## 2023-10-04 PROCEDURE — 99214 OFFICE O/P EST MOD 30 MIN: CPT | Mod: 95,,, | Performed by: INTERNAL MEDICINE

## 2023-10-04 NOTE — PROGRESS NOTES
The patient location is:  Patient Home   The chief complaint leading to consultation is:  Diabetes    Subjective:         One increased hba1c, two elevated fasting glucoses  Started metformin q am and diet an dexercise, hba1c now wnl.   Denies hypoglycemia.    Sees dr blanca Hawthorne  He has type 2 diabetes mellitus. No MedicAlert identification noted. The initial diagnosis of diabetes was made 6 months ago. Pertinent negatives for hypoglycemia include no confusion, dizziness, headaches, hunger, mood changes, nervousness/anxiousness, pallor, seizures, sleepiness, speech difficulty, sweats or tremors. Pertinent negatives for diabetes include no blurred vision, no chest pain, no fatigue, no foot paresthesias, no foot ulcerations, no polydipsia, no polyphagia, no polyuria, no visual change, no weakness and no weight loss. Pertinent negatives for hypoglycemia complications include no blackouts, no hospitalization, no nocturnal hypoglycemia, no required assistance and no required glucagon injection. Symptoms are stable. Pertinent negatives for diabetic complications include no autonomic neuropathy, CVA, heart disease, impotence, nephropathy, peripheral neuropathy, PVD or retinopathy. There are no known risk factors for coronary artery disease. Current diabetic treatment includes oral agent (monotherapy). He is compliant with treatment all of the time. His weight is stable. He is following a generally healthy and high fiber diet. Meal planning includes avoidance of concentrated sweets and carbohydrate counting. He has not had a previous visit with a dietitian. He participates in exercise daily. His home blood glucose trend is decreasing steadily. He does not see a podiatrist.Eye exam is not current.      Lewis Fish is a 67 y.o.  male who presents for Diabetes  .   Review of Systems   Constitutional:  Negative for fatigue and weight loss.   Eyes:  Negative for blurred vision.   Cardiovascular:  Negative for chest  pain.   Endocrine: Negative for polydipsia, polyphagia and polyuria.   Genitourinary:  Negative for impotence.   Skin:  Negative for pallor.   Neurological:  Negative for dizziness, tremors, seizures, speech difficulty, weakness and headaches.   Psychiatric/Behavioral:  Negative for confusion. The patient is not nervous/anxious.        Patient Active Problem List   Diagnosis    Prostate cancer    Atypical nevus    ADHD (attention deficit hyperactivity disorder)    CHEO (generalized anxiety disorder)    Varicose veins of both legs with edema    Controlled type 2 diabetes mellitus without complication, without long-term current use of insulin    Stage 3a chronic kidney disease       Past Medical History:   Diagnosis Date    ADHD (attention deficit hyperactivity disorder)     Allergy seasonal    Anxiety     Controlled diabetes mellitus type 1 without complications 4/4/2023    Depression     Family history of early CAD     brother with CAD age 49    Gilbert syndrome     History of psychiatric care     Prostate cancer prostate    bradytherapy    Psychiatric problem     Therapy        Past Surgical History:   Procedure Laterality Date    ANKLE LIGAMENT RECONSTRUCTION  2014    right    ARTHROSCOPY,SHOULDER,WITH BICEPS TENODESIS Left 2/17/2023    Procedure: ARTHROSCOPY,SHOULDER,WITH BICEPS TENODESIS;  Surgeon: Claude S. Williams IV, MD;  Location: [facility];  Service: Orthopedics;  Laterality: Left;    COLONOSCOPY N/A 5/12/2022    Procedure: COLONOSCOPY;  Surgeon: Sid Ko MD;  Location: [facility] ENDO ([address] [place] [address]);  Service: Endoscopy;  Laterality: N/A;  fully vaccinated -   instructions emailed-     HERNIA REPAIR  1996    inguinal bilateral    PROSTATE SURGERY  2008    brachytherapy    ROTATOR CUFF REPAIR Left 2/17/2023    Procedure: REPAIR, ROTATOR CUFF;  Surgeon: Claude S. Williams IV, MD;  Location: [facility];  Service: Orthopedics;  Laterality: Left;    SHOULDER ARTHROSCOPY Left 2/17/2023    Procedure: ARTHROSCOPY,  SHOULDER;  Surgeon: Claude S. Williams IV, MD;  Location: [facility];  Service: Orthopedics;  Laterality: Left;    testicle removed      complication of hernia repair    TUMOR REMOVAL  1996    nail bed       Family History   Problem Relation Age of Onset    Arthritis Mother     Skin cancer Mother     Asthma Brother     Alcohol abuse Maternal Grandfather     Alcohol abuse Paternal Grandmother     Skin cancer Maternal Grandmother     Macular degeneration Maternal Grandmother     Coronary artery disease Brother 49    Drug abuse Other     Glaucoma Neg Hx     Strabismus Neg Hx     Stroke Neg Hx     Blindness Neg Hx        Social History     Tobacco Use    Smoking status: Never    Smokeless tobacco: Never   Substance Use Topics    Alcohol use: Yes     Alcohol/week: 1.0 standard drink of alcohol     Types: 1 Cans of beer per week     Comment: less than one a week    Drug use: Never       Objective:   There were no vitals taken for this visit.     Physical Exam  Constitutional:       General: He is not in acute distress.     Appearance: He is well-developed. He is not diaphoretic.   HENT:      Head: Normocephalic and atraumatic.   Eyes:      General: No scleral icterus.        Right eye: No discharge.         Left eye: No discharge.   Pulmonary:      Effort: Pulmonary effort is normal. No respiratory distress.   Skin:     Coloration: Skin is not pale.      Findings: No erythema.   Neurological:      Mental Status: He is alert and oriented to person, place, and time.   Psychiatric:         Behavior: Behavior normal.         Thought Content: Thought content normal.           Prior labs reviewed  Assessment/Plan:       1. Controlled type 2 diabetes mellitus without complication, without long-term current use of insulin  Overview:  One hba1c at 6.5 2/23  Recent surgery may have increased glucose numbers  followed healthy diet, reduced sweets, cont'd exercise  7/23 labs at work hba1c now 5.8  Cont healthy lifestyle, start  metformin  10/23 hba1c wnl, on metformin 500 mg daily, no hypoglycemia            Assessment & Plan:  Foot exam in clinic with BP in one week with jerrysevero Silva meds, consider stopping on rtc in 6 mo if continues doing well     Orders:  -     Ambulatory referral/consult to Optometry; Future; Expected date: 10/11/2023  -     Microalbumin/Creatinine Ratio, Urine; Future; Expected date: 04/01/2024  -     Comprehensive Metabolic Panel; Future; Expected date: 04/05/2024  -     Lipid Panel; Future; Expected date: 04/05/2024  -     Hemoglobin A1C; Future; Expected date: 04/01/2024  -     CBC Auto Differential; Future; Expected date: 04/05/2024    2. Stage 3a chronic kidney disease  Overview:  avoid nsaids, increase po water intake  avoid all nephrotoxins, cont to keep BP low (no HTN) and diabetes controlled      3. Prostate cancer  Overview:  Brachytherapy 2009   Repeat PSAs negative  No longer needs screening       4. Encounter for diabetes type 2 eye exam  -     Ambulatory referral/consult to Optometry; Future; Expected date: 10/11/2023    5. Arm skin lesion, right  -     Ambulatory referral/consult to Dermatology; Future; Expected date: 10/11/2023    6. Atypical nevus  Overview:  Precancerous tumor of nailbed, removed 1996  10/23 lesion on arm not seen in derm, pt reports no changes  Referral placed and pt instructed to contact dr felipe to schedule apt              Visit type: Virtual visit with synchronous audio and video    Total time spent with patient: 23 minutes min spent in care of patient including history, physical, chart review, orders and coordination of care.       Each patient to whom he or she provides medical services by telemedicine is:  (1) informed of the relationship between the physician and patient and the respective role of any other health care provider with respect to management of the patient; and (2) notified that he or she may decline to receive medical services by telemedicine and may withdraw  from such care at any time.  Medication List with Changes/Refills   Current Medications    CETIRIZINE (ZYRTEC) 10 MG TABLET    Take 10 mg by mouth once daily.    DEXTROAMPHETAMINE-AMPHETAMINE (ADDERALL XR) 20 MG 24 HR CAPSULE    Take 20 mg by mouth every morning.    DEXTROAMPHETAMINE-AMPHETAMINE (ADDERALL XR) 30 MG 24 HR CAPSULE    Take 30 mg by mouth every morning.    FLUTICASONE PROPIONATE (FLONASE) 50 MCG/ACTUATION NASAL SPRAY    1 spray by Each Nostril route once daily.    METFORMIN (GLUCOPHAGE) 500 MG TABLET    Take 1 tablet (500 mg total) by mouth daily with breakfast.    ROSUVASTATIN (CRESTOR) 20 MG TABLET    Take 1 tablet (20 mg total) by mouth every evening.    SERTRALINE (ZOLOFT) 100 MG TABLET    Take 1 tablet (100 mg total) by mouth every evening.    TADALAFIL (CIALIS) 20 MG TAB    Take 1 tablet (20 mg total) by mouth every 72 hours as needed (ED). Take at least 2 hours before activity    TAMSULOSIN (FLOMAX) 0.4 MG CAP    Take 1 capsule (0.4 mg total) by mouth once daily.   Discontinued Medications    ASPIRIN (ECOTRIN) 81 MG EC TABLET    Take 1 tablet (81 mg total) by mouth once daily.    SILDENAFIL (VIAGRA) 100 MG TABLET    Take 100 mg by mouth.

## 2023-10-04 NOTE — Clinical Note
He needs diabetic foot exam and BP in record so seeing you this week. Talk to me if you need help with foot exam- he's new well controlled diabetic so should be totally normal, No htn. Thanks!  He should be scheduled with me in 6 mo for his annual

## 2023-10-04 NOTE — ASSESSMENT & PLAN NOTE
Foot exam in clinic with BP in one week with jerry jackson, consider stopping on rtc in 6 mo if continues doing well

## 2023-10-05 ENCOUNTER — PATIENT OUTREACH (OUTPATIENT)
Dept: ADMINISTRATIVE | Facility: HOSPITAL | Age: 68
End: 2023-10-05
Payer: COMMERCIAL

## 2023-10-05 ENCOUNTER — OFFICE VISIT (OUTPATIENT)
Dept: INTERNAL MEDICINE | Facility: CLINIC | Age: 68
End: 2023-10-05
Payer: COMMERCIAL

## 2023-10-05 VITALS
DIASTOLIC BLOOD PRESSURE: 66 MMHG | OXYGEN SATURATION: 100 % | HEIGHT: 74 IN | SYSTOLIC BLOOD PRESSURE: 102 MMHG | HEART RATE: 66 BPM | WEIGHT: 191.13 LBS | BODY MASS INDEX: 24.53 KG/M2

## 2023-10-05 DIAGNOSIS — E11.9 CONTROLLED TYPE 2 DIABETES MELLITUS WITHOUT COMPLICATION, WITHOUT LONG-TERM CURRENT USE OF INSULIN: ICD-10-CM

## 2023-10-05 DIAGNOSIS — E11.9 ENCOUNTER FOR DIABETIC FOOT EXAM: Primary | ICD-10-CM

## 2023-10-05 PROCEDURE — 99999 PR PBB SHADOW E&M-EST. PATIENT-LVL III: ICD-10-PCS | Mod: PBBFAC,,, | Performed by: PHYSICIAN ASSISTANT

## 2023-10-05 PROCEDURE — 99999 PR PBB SHADOW E&M-EST. PATIENT-LVL III: CPT | Mod: PBBFAC,,, | Performed by: PHYSICIAN ASSISTANT

## 2023-10-05 PROCEDURE — 99212 OFFICE O/P EST SF 10 MIN: CPT | Mod: S$GLB,,, | Performed by: PHYSICIAN ASSISTANT

## 2023-10-05 PROCEDURE — 99212 PR OFFICE/OUTPT VISIT, EST, LEVL II, 10-19 MIN: ICD-10-PCS | Mod: S$GLB,,, | Performed by: PHYSICIAN ASSISTANT

## 2023-10-05 NOTE — PROGRESS NOTES
INTERNAL MEDICINE PROGRESS NOTE    CHIEF COMPLAINT     Chief Complaint   Patient presents with    Diabetic Foot Exam     both       [facility]     Lewis Fish is a 67 y.o. male who presents for a follow-up visit today.    PCP is Arlene Finch MD, patient is new to me.     He is here today for diabetic foot exam. He was last seen by PCP yesterday.  He has DM Type II and A1c is well controlled at 5.5.   He has no complaints about his feet.     Past Medical History:  Past Medical History:   Diagnosis Date    ADHD (attention deficit hyperactivity disorder)     Allergy seasonal    Anxiety     Controlled diabetes mellitus type 1 without complications 4/4/2023    Depression     Family history of early CAD     brother with CAD age 49    Gilbert syndrome     History of psychiatric care     Prostate cancer prostate    bradytherapy    Psychiatric problem     Therapy        Home Medications:  Prior to Admission medications    Medication Sig Start Date End Date Taking? Authorizing Provider   cetirizine (ZYRTEC) 10 MG tablet Take 10 mg by mouth once daily.   Yes Provider, Historical   dextroamphetamine-amphetamine (ADDERALL XR) 20 MG 24 hr capsule Take 20 mg by mouth every morning. 1/6/22  Yes Provider, Historical   dextroamphetamine-amphetamine (ADDERALL XR) 30 MG 24 hr capsule Take 30 mg by mouth every morning.   Yes Provider, Historical   fluticasone propionate (FLONASE) 50 mcg/actuation nasal spray 1 spray by Each Nostril route once daily.   Yes Provider, Historical   metFORMIN (GLUCOPHAGE) 500 MG tablet Take 1 tablet (500 mg total) by mouth daily with breakfast. 7/20/23 7/19/24 Yes Arlene Finch MD   rosuvastatin (CRESTOR) 20 MG tablet Take 1 tablet (20 mg total) by mouth every evening. 4/4/23 4/3/24 Yes Arlene Finch MD   sertraline (ZOLOFT) 100 MG tablet Take 1 tablet (100 mg total) by mouth every evening. 10/28/21  Yes Viri Waller NP-C   tadalafiL (CIALIS) 20 MG Tab Take 1 tablet (20 mg total)  "by mouth every 72 hours as needed (ED). Take at least 2 hours before activity 3/9/23 3/8/24 Yes Jagruti Castillo, NP   tamsulosin (FLOMAX) 0.4 mg Cap Take 1 capsule (0.4 mg total) by mouth once daily. 3/9/23 3/8/24 Yes Jagruti Castillo, NP       Review of Systems:  Review of Systems   Constitutional:  Negative for chills and fever.   HENT:  Negative for sore throat and trouble swallowing.    Eyes:  Negative for visual disturbance.   Respiratory:  Negative for cough and shortness of breath.    Cardiovascular:  Negative for chest pain.   Gastrointestinal:  Negative for abdominal pain, constipation, diarrhea, nausea and vomiting.   Genitourinary:  Negative for dysuria and flank pain.   Musculoskeletal:  Negative for back pain, neck pain and neck stiffness.   Skin:  Negative for rash.   Neurological:  Negative for dizziness, syncope, weakness and headaches.   Psychiatric/Behavioral:  Negative for confusion.        Health Maintainence:   Immunizations:  Health Maintenance         Date Due Completion Date    Foot Exam Never done ---    Pneumococcal Vaccines (Age 65+) (2 - PCV) 01/17/2020 1/17/2019    Eye Exam 03/25/2022 3/25/2021    PROSTATE-SPECIFIC ANTIGEN 01/07/2023 1/7/2022    Influenza Vaccine (1) 09/01/2023 10/13/2022    COVID-19 Vaccine (6 - Pfizer series) 09/15/2023 5/15/2023    Hemoglobin A1c 03/25/2024 9/25/2023    Lipid Panel 03/30/2024 3/30/2023    Diabetes Urine Screening 09/25/2024 9/25/2023    Low Dose Statin 10/05/2024 10/5/2023    Colorectal Cancer Screening 05/12/2029 5/12/2022    Override on 7/12/2011: Done    TETANUS VACCINE 03/23/2032 3/23/2022             PHYSICAL EXAM     /66 (BP Location: Left arm, Patient Position: Sitting, BP Method: Large (Manual))   Pulse 66   Ht 6' 2" (1.88 m)   Wt 86.7 kg (191 lb 2.2 oz)   SpO2 100%   BMI 24.54 kg/m²     Physical Exam  Vitals and nursing note reviewed.   Constitutional:       Appearance: Normal appearance.      Comments: Healthy appearing male in " NAD or apparent pain. He makes good eye contact, speaks in clear full sentences and ambulates with ease.        HENT:      Head: Normocephalic and atraumatic.      Nose: Nose normal.      Mouth/Throat:      Pharynx: Oropharynx is clear.   Eyes:      Conjunctiva/sclera: Conjunctivae normal.   Cardiovascular:      Rate and Rhythm: Normal rate.      Pulses: Normal pulses.           Dorsalis pedis pulses are 2+ on the right side and 2+ on the left side.        Posterior tibial pulses are 2+ on the right side and 2+ on the left side.   Pulmonary:      Effort: No respiratory distress.   Musculoskeletal:         General: Normal range of motion.      Cervical back: No rigidity.      Right foot: Normal range of motion. No deformity, bunion, Charcot foot, foot drop or prominent metatarsal heads.      Left foot: Normal range of motion. No deformity, bunion, Charcot foot, foot drop or prominent metatarsal heads.   Feet:      Right foot:      Protective Sensation: 4 sites tested.  4 sites sensed.      Skin integrity: Callus present.      Toenail Condition: Right toenails are normal.      Left foot:      Protective Sensation: 4 sites tested.  4 sites sensed.      Skin integrity: Callus present.      Toenail Condition: Left toenails are normal.   Skin:     General: Skin is warm and dry.      Capillary Refill: Capillary refill takes less than 2 seconds.      Findings: No rash.   Neurological:      General: No focal deficit present.      Mental Status: He is alert.      Gait: Gait normal.   Psychiatric:         Mood and Affect: Mood normal.         LABS     Lab Results   Component Value Date    HGBA1C 5.5 09/25/2023     CMP  Sodium   Date Value Ref Range Status   10/02/2023 141 136 - 145 mmol/L Final     Potassium   Date Value Ref Range Status   10/02/2023 3.6 3.5 - 5.1 mmol/L Final     Chloride   Date Value Ref Range Status   10/02/2023 113 (H) 95 - 110 mmol/L Final     CO2   Date Value Ref Range Status   10/02/2023 20 (L) 23 - 29  mmol/L Final     Glucose   Date Value Ref Range Status   10/02/2023 106 70 - 110 mg/dL Final     BUN   Date Value Ref Range Status   10/02/2023 26 (H) 8 - 23 mg/dL Final     Creatinine   Date Value Ref Range Status   10/02/2023 1.4 0.5 - 1.4 mg/dL Final     Calcium   Date Value Ref Range Status   10/02/2023 9.1 8.7 - 10.5 mg/dL Final     Total Protein   Date Value Ref Range Status   10/02/2023 6.3 6.0 - 8.4 g/dL Final     Albumin   Date Value Ref Range Status   10/02/2023 4.1 3.5 - 5.2 g/dL Final     Total Bilirubin   Date Value Ref Range Status   10/02/2023 1.2 (H) 0.1 - 1.0 mg/dL Final     Comment:     For infants and newborns, interpretation of results should be based  on gestational age, weight and in agreement with clinical  observations.    Premature Infant recommended reference ranges:  Up to 24 hours.............<8.0 mg/dL  Up to 48 hours............<12.0 mg/dL  3-5 days..................<15.0 mg/dL  6-29 days.................<15.0 mg/dL       Alkaline Phosphatase   Date Value Ref Range Status   10/02/2023 52 (L) 55 - 135 U/L Final     AST   Date Value Ref Range Status   10/02/2023 19 10 - 40 U/L Final     ALT   Date Value Ref Range Status   10/02/2023 19 10 - 44 U/L Final     Anion Gap   Date Value Ref Range Status   10/02/2023 8 8 - 16 mmol/L Final     eGFR if    Date Value Ref Range Status   01/07/2022 >60 >60 mL/min/1.73 m^2 Final     eGFR if non    Date Value Ref Range Status   01/07/2022 53 (A) >60 mL/min/1.73 m^2 Final     Comment:     Calculation used to obtain the estimated glomerular filtration  rate (eGFR) is the CKD-EPI equation.        Lab Results   Component Value Date    WBC 4.41 03/30/2023    HGB 14.9 03/30/2023    HCT 42.6 03/30/2023    MCV 83 03/30/2023     (L) 03/30/2023     Lab Results   Component Value Date    CHOL 160 03/30/2023    CHOL 171 04/07/2017    CHOL 178 11/30/2016     Lab Results   Component Value Date    HDL 39 (L) 03/30/2023    HDL 35  (L) 04/07/2017    HDL 37 (L) 11/30/2016     Lab Results   Component Value Date    LDLCALC 97.2 03/30/2023    LDLCALC 106.4 04/07/2017    LDLCALC 111.8 11/30/2016     Lab Results   Component Value Date    TRIG 119 03/30/2023    TRIG 148 04/07/2017    TRIG 146 11/30/2016     Lab Results   Component Value Date    CHOLHDL 24.4 03/30/2023    CHOLHDL 20.5 04/07/2017    CHOLHDL 20.8 11/30/2016     Lab Results   Component Value Date    TSH 1.459 10/12/2020       ASSESSMENT/PLAN     Lewis Fish is a 67 y.o. male     Lewis was seen today for diabetic foot exam.    Diagnoses and all orders for this visit:    Encounter for diabetic foot exam   -no concerning abn on exam. Pt made aware of minor calluses    Controlled type 2 diabetes mellitus without complication, without long-term current use of insulin    RTC with PCP as previously planned for routine health maintenance.     Ashleigh Donahue PA-C   Department of Internal Medicine - [facility]   10:17 AM

## 2024-01-17 ENCOUNTER — OFFICE VISIT (OUTPATIENT)
Dept: OPTOMETRY | Facility: CLINIC | Age: 69
End: 2024-01-17
Payer: COMMERCIAL

## 2024-01-17 DIAGNOSIS — E11.9 CONTROLLED TYPE 2 DIABETES MELLITUS WITHOUT COMPLICATION, WITHOUT LONG-TERM CURRENT USE OF INSULIN: ICD-10-CM

## 2024-01-17 DIAGNOSIS — H52.4 PRESBYOPIA: ICD-10-CM

## 2024-01-17 DIAGNOSIS — E11.9 TYPE 2 DIABETES MELLITUS WITHOUT OPHTHALMIC MANIFESTATIONS: ICD-10-CM

## 2024-01-17 DIAGNOSIS — E11.9 ENCOUNTER FOR DIABETES TYPE 2 EYE EXAM: ICD-10-CM

## 2024-01-17 DIAGNOSIS — Z01.00 ENCOUNTER FOR DIABETES TYPE 2 EYE EXAM: ICD-10-CM

## 2024-01-17 DIAGNOSIS — H52.13 MYOPIA, BILATERAL: ICD-10-CM

## 2024-01-17 DIAGNOSIS — H25.13 NS (NUCLEAR SCLEROSIS), BILATERAL: Primary | ICD-10-CM

## 2024-01-17 DIAGNOSIS — H43.811 PVD (POSTERIOR VITREOUS DETACHMENT), RIGHT EYE: ICD-10-CM

## 2024-01-17 PROCEDURE — 92015 DETERMINE REFRACTIVE STATE: CPT | Mod: S$GLB,,, | Performed by: OPTOMETRIST

## 2024-01-17 PROCEDURE — 99999 PR PBB SHADOW E&M-EST. PATIENT-LVL III: CPT | Mod: PBBFAC,,, | Performed by: OPTOMETRIST

## 2024-01-17 PROCEDURE — 92014 COMPRE OPH EXAM EST PT 1/>: CPT | Mod: S$GLB,,, | Performed by: OPTOMETRIST

## 2024-01-17 NOTE — PROGRESS NOTES
JAQUELINE    DA: 3/25/2021 - Dr. Whipple     CC: Pt is here today for a routine eye exam. Pt states that he noticed a   change with mostly his distance vision and his right eye is worse.     (+)Dryness - only due to the current weather changes  (-)Burning  (-)Itchiness  (-)Tearing  (-)Flashes  (-)Floaters   (+)Photophobia  (-)Eye Pain      Diabetic: yes  A1C: 5.5 on 9/23/2023    Contact Lens: no    Eye Meds: no  Last edited by Macie Gould MA on 1/17/2024  2:44 PM.            Assessment /Plan     For exam results, see Encounter Report.    NS (nuclear sclerosis), bilateral   OD>OS   Monitor yearly    Encounter for diabetes type 2 eye exam  Type 2 diabetes mellitus without ophthalmic manifestations  Controlled type 2 diabetes mellitus without complication, without long-term current use of insulin  No retinopathy, monitor yearly    PVD (posterior vitreous detachment), right eye    Myopia, bilateral  Presbyopia   Rx specs, change OD likely 2/2 central NS OD      RTC 1 year

## 2024-03-01 DIAGNOSIS — R39.198 SLOW URINARY STREAM: ICD-10-CM

## 2024-03-01 RX ORDER — TAMSULOSIN HYDROCHLORIDE 0.4 MG/1
1 CAPSULE ORAL
Qty: 90 CAPSULE | Refills: 3 | Status: SHIPPED | OUTPATIENT
Start: 2024-03-01

## 2024-03-05 ENCOUNTER — PATIENT MESSAGE (OUTPATIENT)
Dept: OPTOMETRY | Facility: CLINIC | Age: 69
End: 2024-03-05
Payer: COMMERCIAL

## 2024-03-06 ENCOUNTER — OFFICE VISIT (OUTPATIENT)
Dept: OPTOMETRY | Facility: CLINIC | Age: 69
End: 2024-03-06
Payer: COMMERCIAL

## 2024-03-06 DIAGNOSIS — H52.4 PRESBYOPIA: Primary | ICD-10-CM

## 2024-03-06 PROCEDURE — 99999 PR PBB SHADOW E&M-EST. PATIENT-LVL II: CPT | Mod: PBBFAC,,, | Performed by: OPTOMETRIST

## 2024-03-06 PROCEDURE — 99499 UNLISTED E&M SERVICE: CPT | Mod: S$GLB,,, | Performed by: OPTOMETRIST

## 2024-03-06 NOTE — PROGRESS NOTES
Assessment /Plan     For exam results, see Encounter Report.    Presbyopia      Rx change, remake OU

## 2024-04-01 ENCOUNTER — LAB VISIT (OUTPATIENT)
Dept: LAB | Facility: OTHER | Age: 69
End: 2024-04-01
Attending: INTERNAL MEDICINE
Payer: COMMERCIAL

## 2024-04-01 DIAGNOSIS — E11.9 CONTROLLED TYPE 2 DIABETES MELLITUS WITHOUT COMPLICATION, WITHOUT LONG-TERM CURRENT USE OF INSULIN: ICD-10-CM

## 2024-04-01 LAB
ALBUMIN SERPL BCP-MCNC: 4.1 G/DL (ref 3.5–5.2)
ALBUMIN/CREAT UR: 5.9 UG/MG (ref 0–30)
ALP SERPL-CCNC: 51 U/L (ref 55–135)
ALT SERPL W/O P-5'-P-CCNC: 20 U/L (ref 10–44)
ANION GAP SERPL CALC-SCNC: 8 MMOL/L (ref 8–16)
AST SERPL-CCNC: 22 U/L (ref 10–40)
BASOPHILS # BLD AUTO: 0.02 K/UL (ref 0–0.2)
BASOPHILS NFR BLD: 0.5 % (ref 0–1.9)
BILIRUB SERPL-MCNC: 1.1 MG/DL (ref 0.1–1)
BUN SERPL-MCNC: 32 MG/DL (ref 8–23)
CALCIUM SERPL-MCNC: 9 MG/DL (ref 8.7–10.5)
CHLORIDE SERPL-SCNC: 112 MMOL/L (ref 95–110)
CHOLEST SERPL-MCNC: 104 MG/DL (ref 120–199)
CHOLEST/HDLC SERPL: 2.4 {RATIO} (ref 2–5)
CO2 SERPL-SCNC: 21 MMOL/L (ref 23–29)
CREAT SERPL-MCNC: 1.5 MG/DL (ref 0.5–1.4)
CREAT UR-MCNC: 102.4 MG/DL (ref 23–375)
DIFFERENTIAL METHOD BLD: ABNORMAL
EOSINOPHIL # BLD AUTO: 0.1 K/UL (ref 0–0.5)
EOSINOPHIL NFR BLD: 2.9 % (ref 0–8)
ERYTHROCYTE [DISTWIDTH] IN BLOOD BY AUTOMATED COUNT: 13.8 % (ref 11.5–14.5)
EST. GFR  (NO RACE VARIABLE): 51 ML/MIN/1.73 M^2
ESTIMATED AVG GLUCOSE: 111 MG/DL (ref 68–131)
GLUCOSE SERPL-MCNC: 109 MG/DL (ref 70–110)
HBA1C MFR BLD: 5.5 % (ref 4–5.6)
HCT VFR BLD AUTO: 38.9 % (ref 40–54)
HDLC SERPL-MCNC: 44 MG/DL (ref 40–75)
HDLC SERPL: 42.3 % (ref 20–50)
HGB BLD-MCNC: 13.1 G/DL (ref 14–18)
IMM GRANULOCYTES # BLD AUTO: 0.01 K/UL (ref 0–0.04)
IMM GRANULOCYTES NFR BLD AUTO: 0.3 % (ref 0–0.5)
LDLC SERPL CALC-MCNC: 44.8 MG/DL (ref 63–159)
LYMPHOCYTES # BLD AUTO: 1.3 K/UL (ref 1–4.8)
LYMPHOCYTES NFR BLD: 35 % (ref 18–48)
MCH RBC QN AUTO: 27.9 PG (ref 27–31)
MCHC RBC AUTO-ENTMCNC: 33.7 G/DL (ref 32–36)
MCV RBC AUTO: 83 FL (ref 82–98)
MICROALBUMIN UR DL<=1MG/L-MCNC: 6 UG/ML
MONOCYTES # BLD AUTO: 0.3 K/UL (ref 0.3–1)
MONOCYTES NFR BLD: 7.6 % (ref 4–15)
NEUTROPHILS # BLD AUTO: 2 K/UL (ref 1.8–7.7)
NEUTROPHILS NFR BLD: 53.7 % (ref 38–73)
NONHDLC SERPL-MCNC: 60 MG/DL
NRBC BLD-RTO: 0 /100 WBC
PLATELET # BLD AUTO: 119 K/UL (ref 150–450)
PMV BLD AUTO: 9.3 FL (ref 9.2–12.9)
POTASSIUM SERPL-SCNC: 4.2 MMOL/L (ref 3.5–5.1)
PROT SERPL-MCNC: 6.4 G/DL (ref 6–8.4)
RBC # BLD AUTO: 4.7 M/UL (ref 4.6–6.2)
SODIUM SERPL-SCNC: 141 MMOL/L (ref 136–145)
TRIGL SERPL-MCNC: 76 MG/DL (ref 30–150)
WBC # BLD AUTO: 3.8 K/UL (ref 3.9–12.7)

## 2024-04-01 PROCEDURE — 82043 UR ALBUMIN QUANTITATIVE: CPT | Performed by: INTERNAL MEDICINE

## 2024-04-01 PROCEDURE — 80053 COMPREHEN METABOLIC PANEL: CPT | Performed by: INTERNAL MEDICINE

## 2024-04-01 PROCEDURE — 80061 LIPID PANEL: CPT | Performed by: INTERNAL MEDICINE

## 2024-04-01 PROCEDURE — 83036 HEMOGLOBIN GLYCOSYLATED A1C: CPT | Performed by: INTERNAL MEDICINE

## 2024-04-01 PROCEDURE — 85025 COMPLETE CBC W/AUTO DIFF WBC: CPT | Performed by: INTERNAL MEDICINE

## 2024-04-01 PROCEDURE — 36415 COLL VENOUS BLD VENIPUNCTURE: CPT | Performed by: INTERNAL MEDICINE

## 2024-04-08 RX ORDER — ROSUVASTATIN CALCIUM 20 MG/1
20 TABLET, COATED ORAL NIGHTLY
Qty: 90 TABLET | Refills: 1 | Status: SHIPPED | OUTPATIENT
Start: 2024-04-08

## 2024-04-08 NOTE — TELEPHONE ENCOUNTER
No care due was identified.  [facility] Embedded Care Due Messages. Reference number: [id number].   4/08/2024 8:01:55 AM CDT

## 2024-04-09 NOTE — TELEPHONE ENCOUNTER
Refill Decision Note   Lewis Fish  is requesting a refill authorization.  Brief Assessment and Rationale for Refill:  Approve     Medication Therapy Plan:        Comments:     Note composed:7:45 PM 04/08/2024

## 2024-04-18 DIAGNOSIS — N52.01 ERECTILE DYSFUNCTION DUE TO ARTERIAL INSUFFICIENCY: ICD-10-CM

## 2024-04-19 RX ORDER — TADALAFIL 20 MG/1
20 TABLET ORAL
Qty: 30 TABLET | Refills: 3 | Status: SHIPPED | OUTPATIENT
Start: 2024-04-19 | End: 2025-04-19

## 2024-05-15 ENCOUNTER — OFFICE VISIT (OUTPATIENT)
Dept: INTERNAL MEDICINE | Facility: CLINIC | Age: 69
End: 2024-05-15
Payer: COMMERCIAL

## 2024-05-15 ENCOUNTER — PATIENT MESSAGE (OUTPATIENT)
Dept: INTERNAL MEDICINE | Facility: CLINIC | Age: 69
End: 2024-05-15
Payer: COMMERCIAL

## 2024-05-15 ENCOUNTER — LAB VISIT (OUTPATIENT)
Dept: LAB | Facility: HOSPITAL | Age: 69
End: 2024-05-15
Payer: COMMERCIAL

## 2024-05-15 ENCOUNTER — PATIENT MESSAGE (OUTPATIENT)
Dept: INTERNAL MEDICINE | Facility: CLINIC | Age: 69
End: 2024-05-15

## 2024-05-15 VITALS
HEIGHT: 74 IN | BODY MASS INDEX: 23.97 KG/M2 | OXYGEN SATURATION: 99 % | HEART RATE: 77 BPM | DIASTOLIC BLOOD PRESSURE: 60 MMHG | SYSTOLIC BLOOD PRESSURE: 94 MMHG | WEIGHT: 186.75 LBS

## 2024-05-15 DIAGNOSIS — R55 PRE-SYNCOPE: Primary | ICD-10-CM

## 2024-05-15 DIAGNOSIS — R55 POSTURAL DIZZINESS WITH PRESYNCOPE: ICD-10-CM

## 2024-05-15 DIAGNOSIS — R42 POSTURAL DIZZINESS WITH PRESYNCOPE: ICD-10-CM

## 2024-05-15 DIAGNOSIS — F41.1 GAD (GENERALIZED ANXIETY DISORDER): ICD-10-CM

## 2024-05-15 LAB
ALBUMIN SERPL BCP-MCNC: 4.5 G/DL (ref 3.5–5.2)
ALP SERPL-CCNC: 54 U/L (ref 55–135)
ALT SERPL W/O P-5'-P-CCNC: 17 U/L (ref 10–44)
ANION GAP SERPL CALC-SCNC: 12 MMOL/L (ref 8–16)
AST SERPL-CCNC: 16 U/L (ref 10–40)
BASOPHILS # BLD AUTO: 0.03 K/UL (ref 0–0.2)
BASOPHILS NFR BLD: 0.5 % (ref 0–1.9)
BILIRUB SERPL-MCNC: 2.5 MG/DL (ref 0.1–1)
BUN SERPL-MCNC: 32 MG/DL (ref 8–23)
CALCIUM SERPL-MCNC: 9.8 MG/DL (ref 8.7–10.5)
CHLORIDE SERPL-SCNC: 108 MMOL/L (ref 95–110)
CO2 SERPL-SCNC: 21 MMOL/L (ref 23–29)
CREAT SERPL-MCNC: 1.5 MG/DL (ref 0.5–1.4)
DIFFERENTIAL METHOD BLD: ABNORMAL
EOSINOPHIL # BLD AUTO: 0.1 K/UL (ref 0–0.5)
EOSINOPHIL NFR BLD: 1.2 % (ref 0–8)
ERYTHROCYTE [DISTWIDTH] IN BLOOD BY AUTOMATED COUNT: 13.4 % (ref 11.5–14.5)
EST. GFR  (NO RACE VARIABLE): 51 ML/MIN/1.73 M^2
FERRITIN SERPL-MCNC: 82 NG/ML (ref 20–300)
GLUCOSE SERPL-MCNC: 124 MG/DL (ref 70–110)
HAV IGM SERPL QL IA: NORMAL
HBV CORE IGM SERPL QL IA: NORMAL
HBV SURFACE AG SERPL QL IA: NORMAL
HCT VFR BLD AUTO: 44.9 % (ref 40–54)
HCV AB SERPL QL IA: NORMAL
HGB BLD-MCNC: 15.1 G/DL (ref 14–18)
IMM GRANULOCYTES # BLD AUTO: 0.02 K/UL (ref 0–0.04)
IMM GRANULOCYTES NFR BLD AUTO: 0.3 % (ref 0–0.5)
IRON SERPL-MCNC: 61 UG/DL (ref 45–160)
LYMPHOCYTES # BLD AUTO: 0.4 K/UL (ref 1–4.8)
LYMPHOCYTES NFR BLD: 6.8 % (ref 18–48)
MCH RBC QN AUTO: 28.9 PG (ref 27–31)
MCHC RBC AUTO-ENTMCNC: 33.6 G/DL (ref 32–36)
MCV RBC AUTO: 86 FL (ref 82–98)
MONOCYTES # BLD AUTO: 0.2 K/UL (ref 0.3–1)
MONOCYTES NFR BLD: 3.8 % (ref 4–15)
NEUTROPHILS # BLD AUTO: 5.3 K/UL (ref 1.8–7.7)
NEUTROPHILS NFR BLD: 87.4 % (ref 38–73)
NRBC BLD-RTO: 0 /100 WBC
PLATELET # BLD AUTO: 152 K/UL (ref 150–450)
PMV BLD AUTO: 10.1 FL (ref 9.2–12.9)
POTASSIUM SERPL-SCNC: 4.5 MMOL/L (ref 3.5–5.1)
PROT SERPL-MCNC: 7 G/DL (ref 6–8.4)
RBC # BLD AUTO: 5.23 M/UL (ref 4.6–6.2)
RETICS/RBC NFR AUTO: 1.2 % (ref 0.4–2)
SATURATED IRON: 14 % (ref 20–50)
SODIUM SERPL-SCNC: 141 MMOL/L (ref 136–145)
TOTAL IRON BINDING CAPACITY: 441 UG/DL (ref 250–450)
TRANSFERRIN SERPL-MCNC: 298 MG/DL (ref 200–375)
WBC # BLD AUTO: 6.04 K/UL (ref 3.9–12.7)

## 2024-05-15 PROCEDURE — 85025 COMPLETE CBC W/AUTO DIFF WBC: CPT | Performed by: PHYSICIAN ASSISTANT

## 2024-05-15 PROCEDURE — 85045 AUTOMATED RETICULOCYTE COUNT: CPT | Performed by: PHYSICIAN ASSISTANT

## 2024-05-15 PROCEDURE — 99999 PR PBB SHADOW E&M-EST. PATIENT-LVL III: CPT | Mod: PBBFAC,,, | Performed by: PHYSICIAN ASSISTANT

## 2024-05-15 PROCEDURE — 80053 COMPREHEN METABOLIC PANEL: CPT | Performed by: PHYSICIAN ASSISTANT

## 2024-05-15 PROCEDURE — 80074 ACUTE HEPATITIS PANEL: CPT | Performed by: PHYSICIAN ASSISTANT

## 2024-05-15 PROCEDURE — 99214 OFFICE O/P EST MOD 30 MIN: CPT | Mod: S$GLB,,, | Performed by: PHYSICIAN ASSISTANT

## 2024-05-15 PROCEDURE — 83540 ASSAY OF IRON: CPT | Performed by: PHYSICIAN ASSISTANT

## 2024-05-15 PROCEDURE — 36415 COLL VENOUS BLD VENIPUNCTURE: CPT | Performed by: PHYSICIAN ASSISTANT

## 2024-05-15 PROCEDURE — 82728 ASSAY OF FERRITIN: CPT | Performed by: PHYSICIAN ASSISTANT

## 2024-05-15 NOTE — PROGRESS NOTES
INTERNAL MEDICINE URGENT VISIT NOTE    CHIEF COMPLAINT     Chief Complaint   Patient presents with    Diabetes       HPI     Lewis Fish is a 68 y.o. male who presents for an urgent visit today.    PCP is Arlene Finch MD, patient is known to me.     Patient presents with complaints of episodes of weakness and concern for hypoglycemia.   He has DM that is managed with metformin 500 mg daily. His latest A1C was 5.5 (1 month ago). He reports that he has noticed some dizziness with position changes for the past few months. Admits that today while in a meeting when he stood up, he felt dizzy, became diaphoretic and almost passed out. He denies chest pain. He was given a piece of dessert and he immediately felt better but he reports that his BP has been running low as well, both at home when he checks it and here in the office today.     He has no cardiac history, last EKG was 2020.     Has been taking Fe supplement she he saw his routine lab results from last month here his H&H was lower than his baseline. He has routine apt with PCP scheduled for next week. Was planning to discuss lab results with her then. He reports no bleeding, no lower extremity swelling, no chest pain or SOB. But over the past three days he has not been able to exercise because of fatigue.     Of note he has lost 20 lbs intentionally since changing his dietary routine, has been eating less sugar and carbohydrates.     Last C-scope was 5/2022, one polyp was found and he is due for repeat in 2029 (7 years).     The ASCVD Risk score (Walter DK, et al., 2019) failed to calculate for the following reasons:    The valid total cholesterol range is 130 to 320 mg/dL    He is on Crestor 20 mg, tolerates this well.     Wt Readings from Last 20 Encounters:   10/05/23 86.7 kg (191 lb 2.2 oz)   07/20/23 88.9 kg (195 lb 14.1 oz)   06/02/23 92.1 kg (203 lb)   04/03/23 92.3 kg (203 lb 7.8 oz)   03/21/23 93.3 kg (205 lb 11 oz)   03/09/23 93.2 kg (205 lb  9.3 oz)   03/06/23 90.7 kg (200 lb)   02/17/23 90.7 kg (200 lb)   02/14/23 90.7 kg (200 lb)   05/12/22 91.6 kg (202 lb)   01/06/22 95.3 kg (210 lb 1.6 oz)   10/12/20 95.8 kg (211 lb 3.2 oz)   01/17/19 92.5 kg (203 lb 14.8 oz)   04/03/17 91.4 kg (201 lb 8 oz)   09/12/16 86.2 kg (190 lb)   04/04/16 93.9 kg (207 lb 0.2 oz)   02/17/16 93.9 kg (207 lb 0.2 oz)   01/28/16 94.3 kg (207 lb 14.3 oz)   11/10/14 93.1 kg (205 lb 4.8 oz)   07/24/14 88.5 kg (195 lb)        Past Medical History:  Past Medical History:   Diagnosis Date    ADHD (attention deficit hyperactivity disorder)     Allergy seasonal    Anxiety     Controlled diabetes mellitus type 1 without complications 4/4/2023    Depression     Family history of early CAD     brother with CAD age 49    Gilbert syndrome     History of psychiatric care     Prostate cancer prostate    bradytherapy    Psychiatric problem     Therapy        Home Medications:  Prior to Admission medications    Medication Sig Start Date End Date Taking? Authorizing Provider   cetirizine (ZYRTEC) 10 MG tablet Take 10 mg by mouth once daily.    Provider, Historical   dextroamphetamine-amphetamine (ADDERALL XR) 20 MG 24 hr capsule Take 20 mg by mouth every morning. 1/6/22   Provider, Historical   dextroamphetamine-amphetamine (ADDERALL XR) 30 MG 24 hr capsule Take 30 mg by mouth every morning.    Provider, Historical   fluticasone propionate (FLONASE) 50 mcg/actuation nasal spray 1 spray by Each Nostril route once daily.    Provider, Historical   metFORMIN (GLUCOPHAGE) 500 MG tablet Take 1 tablet (500 mg total) by mouth daily with breakfast. 7/20/23 7/19/24  Arlene Finch MD   rosuvastatin (CRESTOR) 20 MG tablet Take 1 tablet (20 mg total) by mouth every evening. 4/8/24   Arlene Finch MD   sertraline (ZOLOFT) 100 MG tablet Take 1 tablet (100 mg total) by mouth every evening. 10/28/21   Viri Waller, NP-C   tadalafiL (CIALIS) 20 MG Tab Take 1 tablet (20 mg total) by mouth  "every 72 hours as needed (ED). Take at least 2 hours before activity 4/19/24 4/19/25  Jagruti Castillo, NP   tamsulosin (FLOMAX) 0.4 mg Cap TAKE 1 CAPSULE BY MOUTH EVERY DAY 3/1/24   Jagruti Castillo, NP       Review of Systems:  Review of Systems   Constitutional:  Negative for chills and fever.   HENT:  Negative for sore throat and trouble swallowing.    Eyes:  Negative for visual disturbance.   Respiratory:  Negative for cough and shortness of breath.    Cardiovascular:  Negative for chest pain.   Gastrointestinal:  Negative for abdominal pain, constipation, diarrhea, nausea and vomiting.   Genitourinary:  Negative for dysuria and flank pain.   Musculoskeletal:  Negative for back pain, neck pain and neck stiffness.   Skin:  Negative for rash.   Neurological:  Positive for syncope. Negative for dizziness, weakness and headaches.   Psychiatric/Behavioral:  Negative for confusion.        Health Maintainence:   Immunizations:  Health Maintenance         Date Due Completion Date    RSV Vaccine (Age 60+ and Pregnant patients) (1 - 1-dose 60+ series) Never done ---    Pneumococcal Vaccines (Age 65+) (2 of 2 - PCV) 01/17/2020 1/17/2019    COVID-19 Vaccine (7 - 2023-24 season) 12/15/2023 10/20/2023    Hemoglobin A1c 10/01/2024 4/1/2024    Foot Exam 10/05/2024 10/5/2023    Eye Exam 01/17/2025 1/17/2024    Override on 1/17/2024: Done    Diabetes Urine Screening 04/01/2025 4/1/2024    Lipid Panel 04/01/2025 4/1/2024    Low Dose Statin 04/08/2025 4/8/2024    Colorectal Cancer Screening 05/12/2029 5/12/2022    Override on 7/12/2011: Done    TETANUS VACCINE 03/23/2032 3/23/2022             PHYSICAL EXAM     BP 94/60   Pulse 77   Ht 6' 2" (1.88 m)   Wt 84.7 kg (186 lb 11.7 oz)   SpO2 99%   BMI 23.97 kg/m²     Physical Exam  Vitals and nursing note reviewed.   Constitutional:       Appearance: Normal appearance.      Comments: Healthy appearing male in NAD or apparent pain. He makes good eye contact, speaks in clear full " sentences and ambulates with ease.     He is not orthostatic   Sitting BP is 94/60 HR 80  Standing BP is 90/70 HR 79       HENT:      Head: Normocephalic and atraumatic.      Nose: Nose normal.      Mouth/Throat:      Pharynx: Oropharynx is clear.   Eyes:      Conjunctiva/sclera: Conjunctivae normal.   Cardiovascular:      Rate and Rhythm: Normal rate and regular rhythm.      Pulses: Normal pulses.   Pulmonary:      Effort: No respiratory distress.   Abdominal:      Tenderness: There is no abdominal tenderness.   Musculoskeletal:         General: Normal range of motion.      Cervical back: No rigidity.      Comments: Scant lower extremity pitting edema - reported to be pt's baseline because of chronic varicose veins.    Skin:     General: Skin is warm and dry.      Capillary Refill: Capillary refill takes less than 2 seconds.      Findings: No rash.   Neurological:      General: No focal deficit present.      Mental Status: He is alert.      Gait: Gait normal.   Psychiatric:         Mood and Affect: Mood normal.         LABS     Lab Results   Component Value Date    HGBA1C 5.5 04/01/2024     CMP  Sodium   Date Value Ref Range Status   04/01/2024 141 136 - 145 mmol/L Final     Potassium   Date Value Ref Range Status   04/01/2024 4.2 3.5 - 5.1 mmol/L Final     Chloride   Date Value Ref Range Status   04/01/2024 112 (H) 95 - 110 mmol/L Final     CO2   Date Value Ref Range Status   04/01/2024 21 (L) 23 - 29 mmol/L Final     Glucose   Date Value Ref Range Status   04/01/2024 109 70 - 110 mg/dL Final     BUN   Date Value Ref Range Status   04/01/2024 32 (H) 8 - 23 mg/dL Final     Creatinine   Date Value Ref Range Status   04/01/2024 1.5 (H) 0.5 - 1.4 mg/dL Final     Calcium   Date Value Ref Range Status   04/01/2024 9.0 8.7 - 10.5 mg/dL Final     Total Protein   Date Value Ref Range Status   04/01/2024 6.4 6.0 - 8.4 g/dL Final     Albumin   Date Value Ref Range Status   04/01/2024 4.1 3.5 - 5.2 g/dL Final     Total  Bilirubin   Date Value Ref Range Status   04/01/2024 1.1 (H) 0.1 - 1.0 mg/dL Final     Comment:     For infants and newborns, interpretation of results should be based  on gestational age, weight and in agreement with clinical  observations.    Premature Infant recommended reference ranges:  Up to 24 hours.............<8.0 mg/dL  Up to 48 hours............<12.0 mg/dL  3-5 days..................<15.0 mg/dL  6-29 days.................<15.0 mg/dL       Alkaline Phosphatase   Date Value Ref Range Status   04/01/2024 51 (L) 55 - 135 U/L Final     AST   Date Value Ref Range Status   04/01/2024 22 10 - 40 U/L Final     ALT   Date Value Ref Range Status   04/01/2024 20 10 - 44 U/L Final     Anion Gap   Date Value Ref Range Status   04/01/2024 8 8 - 16 mmol/L Final     eGFR if    Date Value Ref Range Status   01/07/2022 >60 >60 mL/min/1.73 m^2 Final     eGFR if non    Date Value Ref Range Status   01/07/2022 53 (A) >60 mL/min/1.73 m^2 Final     Comment:     Calculation used to obtain the estimated glomerular filtration  rate (eGFR) is the CKD-EPI equation.        Lab Results   Component Value Date    WBC 3.80 (L) 04/01/2024    HGB 13.1 (L) 04/01/2024    HCT 38.9 (L) 04/01/2024    MCV 83 04/01/2024     (L) 04/01/2024     Lab Results   Component Value Date    CHOL 104 (L) 04/01/2024    CHOL 160 03/30/2023    CHOL 171 04/07/2017     Lab Results   Component Value Date    HDL 44 04/01/2024    HDL 39 (L) 03/30/2023    HDL 35 (L) 04/07/2017     Lab Results   Component Value Date    LDLCALC 44.8 (L) 04/01/2024    LDLCALC 97.2 03/30/2023    LDLCALC 106.4 04/07/2017     Lab Results   Component Value Date    TRIG 76 04/01/2024    TRIG 119 03/30/2023    TRIG 148 04/07/2017     Lab Results   Component Value Date    CHOLHDL 42.3 04/01/2024    CHOLHDL 24.4 03/30/2023    CHOLHDL 20.5 04/07/2017     Lab Results   Component Value Date    TSH 1.459 10/12/2020       ASSESSMENT/PLAN     Lewis Fish is a  68 y.o. male     Lewis was seen today for pre-syncope. Will re-check labs, he has been having a trend of thrombocytopenia but no complaints of bleeding. Will check Fe studies as well. Will get EKG and ECHO - low threshold to report to the ED if any symptoms change or worsen. He has apt with PCP next week.     Diagnoses and all orders for this visit:    Pre-syncope  -     EKG 12-lead; Future  -     Echo; Future    Postural dizziness with presyncope  -     CBC Auto Differential; Future  -     COMPREHENSIVE METABOLIC PANEL; Future  -     FERRITIN; Future  -     Iron and TIBC; Future  -     RETICULOCYTES; Future  -     HEPATITIS PANEL, ACUTE; Future  -     EKG 12-lead; Future  -     Echo; Future     Patient was counseled on when and how to seek emergent care.       Ashleigh Donahue PA-C   Department of Internal Medicine - [facility]   2:44 PM

## 2024-05-15 NOTE — TELEPHONE ENCOUNTER
No care due was identified.  [facility] Embedded Care Due Messages. Reference number: [id number].   5/15/2024 2:12:44 PM CDT

## 2024-05-15 NOTE — TELEPHONE ENCOUNTER
Refill Routing Note   Medication(s) are not appropriate for processing by [facility] for the following reason(s):        No active prescription written by provider: Expiration Date: 10/28/22     OR action(s):  Defer          Appointments  past 12m or future 3m with PCP    Date Provider   Last Visit   10/4/2023 Arlene Finch MD   Next Visit   5/23/2024 Arlene Finch MD   ED visits in past 90 days: 0        Note composed:4:55 PM 05/15/2024

## 2024-05-15 NOTE — Clinical Note
Pre-syncope with some lab abn on routine labs last month. He is also down about 20 lbs (intentionally, was trying to manage DM with diet and exercise). Getting labs and EKG and ECHO today. Any other recs? -ACM

## 2024-05-16 ENCOUNTER — HOSPITAL ENCOUNTER (OUTPATIENT)
Dept: CARDIOLOGY | Facility: CLINIC | Age: 69
Discharge: HOME | End: 2024-05-16
Payer: COMMERCIAL

## 2024-05-16 ENCOUNTER — HOSPITAL ENCOUNTER (OUTPATIENT)
Dept: CARDIOLOGY | Facility: OTHER | Age: 69
Discharge: HOME | End: 2024-05-16
Attending: PHYSICIAN ASSISTANT
Payer: COMMERCIAL

## 2024-05-16 ENCOUNTER — HOSPITAL ENCOUNTER (OUTPATIENT)
Dept: RADIOLOGY | Facility: HOSPITAL | Age: 69
Discharge: HOME | End: 2024-05-16
Attending: PHYSICIAN ASSISTANT
Payer: COMMERCIAL

## 2024-05-16 ENCOUNTER — TELEPHONE (OUTPATIENT)
Dept: INTERNAL MEDICINE | Facility: CLINIC | Age: 69
End: 2024-05-16
Payer: COMMERCIAL

## 2024-05-16 VITALS
WEIGHT: 186 LBS | HEIGHT: 74 IN | BODY MASS INDEX: 23.87 KG/M2 | SYSTOLIC BLOOD PRESSURE: 94 MMHG | DIASTOLIC BLOOD PRESSURE: 60 MMHG

## 2024-05-16 DIAGNOSIS — R55 POSTURAL DIZZINESS WITH PRESYNCOPE: ICD-10-CM

## 2024-05-16 DIAGNOSIS — R42 POSTURAL DIZZINESS WITH PRESYNCOPE: ICD-10-CM

## 2024-05-16 DIAGNOSIS — R51.9 NONINTRACTABLE HEADACHE, UNSPECIFIED CHRONICITY PATTERN, UNSPECIFIED HEADACHE TYPE: ICD-10-CM

## 2024-05-16 DIAGNOSIS — R51.9 NONINTRACTABLE HEADACHE, UNSPECIFIED CHRONICITY PATTERN, UNSPECIFIED HEADACHE TYPE: Primary | ICD-10-CM

## 2024-05-16 DIAGNOSIS — R55 PRE-SYNCOPE: ICD-10-CM

## 2024-05-16 DIAGNOSIS — M54.50 ACUTE MIDLINE LOW BACK PAIN WITHOUT SCIATICA: ICD-10-CM

## 2024-05-16 DIAGNOSIS — E11.9 CONTROLLED TYPE 2 DIABETES MELLITUS WITHOUT COMPLICATION, WITHOUT LONG-TERM CURRENT USE OF INSULIN: ICD-10-CM

## 2024-05-16 LAB
ASCENDING AORTA: 3.44 CM
AV INDEX (PROSTH): 0.82
AV MEAN GRADIENT: 7 MMHG
AV PEAK GRADIENT: 11 MMHG
AV VALVE AREA BY VELOCITY RATIO: 2.87 CM²
AV VALVE AREA: 3.04 CM²
AV VELOCITY RATIO: 0.77
BSA FOR ECHO PROCEDURE: 2.1 M2
CV ECHO LV RWT: 0.36 CM
DOP CALC AO PEAK VEL: 1.64 M/S
DOP CALC AO VTI: 26.6 CM
DOP CALC LVOT AREA: 3.7 CM2
DOP CALC LVOT DIAMETER: 2.18 CM
DOP CALC LVOT PEAK VEL: 1.26 M/S
DOP CALC LVOT STROKE VOLUME: 80.95 CM3
DOP CALC RVOT PEAK VEL: 0.88 M/S
DOP CALCLVOT PEAK VEL VTI: 21.7 CM
E WAVE DECELERATION TIME: 226.48 MSEC
E/A RATIO: 0.74
E/E' RATIO: 6.75 M/S
ECHO LV POSTERIOR WALL: 0.82 CM (ref 0.6–1.1)
EJECTION FRACTION: 65 %
FRACTIONAL SHORTENING: 40 % (ref 28–44)
GLOBAL LONGITUIDAL STRAIN: 18 %
INTERVENTRICULAR SEPTUM: 0.99 CM (ref 0.6–1.1)
IVC DIAMETER: 1.69 CM
LA MAJOR: 5.66 CM
LA MINOR: 5.06 CM
LA WIDTH: 3.1 CM
LEFT ATRIUM SIZE: 3.58 CM
LEFT ATRIUM VOLUME INDEX MOD: 23.6 ML/M2
LEFT ATRIUM VOLUME INDEX: 23.9 ML/M2
LEFT ATRIUM VOLUME MOD: 49.79 CM3
LEFT ATRIUM VOLUME: 50.4 CM3
LEFT INTERNAL DIMENSION IN SYSTOLE: 2.73 CM (ref 2.1–4)
LEFT VENTRICLE DIASTOLIC VOLUME INDEX: 45.76 ML/M2
LEFT VENTRICLE DIASTOLIC VOLUME: 96.55 ML
LEFT VENTRICLE MASS INDEX: 65 G/M2
LEFT VENTRICLE SYSTOLIC VOLUME INDEX: 13.1 ML/M2
LEFT VENTRICLE SYSTOLIC VOLUME: 27.69 ML
LEFT VENTRICULAR INTERNAL DIMENSION IN DIASTOLE: 4.58 CM (ref 3.5–6)
LEFT VENTRICULAR MASS: 137.75 G
LV LATERAL E/E' RATIO: 6 M/S
LV SEPTAL E/E' RATIO: 7.71 M/S
LVOT MG: 4.21 MMHG
LVOT MV: 0.98 CM/S
MV PEAK A VEL: 0.73 M/S
MV PEAK E VEL: 0.54 M/S
MV STENOSIS PRESSURE HALF TIME: 65.68 MS
MV VALVE AREA P 1/2 METHOD: 3.35 CM2
OHS QRS DURATION: 90 MS
OHS QTC CALCULATION: 422 MS
PISA MRMAX VEL: 4.75 M/S
PISA TR MAX VEL: 2.62 M/S
PULM VEIN S/D RATIO: 1.35
PV PEAK D VEL: 0.4 M/S
PV PEAK S VEL: 0.54 M/S
RA MAJOR: 4.59 CM
RA PRESSURE ESTIMATED: 3 MMHG
RA WIDTH: 3.8 CM
RV TB RVSP: 6 MMHG
RV TISSUE DOPPLER FREE WALL SYSTOLIC VELOCITY 1 (APICAL 4 CHAMBER VIEW): 17.35 CM/S
SINUS: 3.3 CM
STJ: 3.19 CM
TDI LATERAL: 0.09 M/S
TDI SEPTAL: 0.07 M/S
TDI: 0.08 M/S
TR MAX PG: 27 MMHG
TR MEAN GRADIENT: 20 MMHG
TRICUSPID ANNULAR PLANE SYSTOLIC EXCURSION: 2.3 CM
TV REST PULMONARY ARTERY PRESSURE: 30 MMHG
Z-SCORE OF LEFT VENTRICULAR DIMENSION IN END DIASTOLE: -3.67
Z-SCORE OF LEFT VENTRICULAR DIMENSION IN END SYSTOLE: -3.09

## 2024-05-16 PROCEDURE — 93010 ELECTROCARDIOGRAM REPORT: CPT | Mod: S$GLB,,, | Performed by: INTERNAL MEDICINE

## 2024-05-16 PROCEDURE — 93306 TTE W/DOPPLER COMPLETE: CPT | Mod: 26,,, | Performed by: INTERNAL MEDICINE

## 2024-05-16 PROCEDURE — 70553 MRI BRAIN STEM W/O & W/DYE: CPT | Mod: TC

## 2024-05-16 PROCEDURE — 93356 MYOCRD STRAIN IMG SPCKL TRCK: CPT | Mod: ,,, | Performed by: INTERNAL MEDICINE

## 2024-05-16 PROCEDURE — 25500020 PHARM REV CODE 255: Performed by: PHYSICIAN ASSISTANT

## 2024-05-16 PROCEDURE — 93005 ELECTROCARDIOGRAM TRACING: CPT | Mod: S$GLB,,, | Performed by: PHYSICIAN ASSISTANT

## 2024-05-16 PROCEDURE — 70553 MRI BRAIN STEM W/O & W/DYE: CPT | Mod: 26,,, | Performed by: RADIOLOGY

## 2024-05-16 PROCEDURE — 72158 MRI LUMBAR SPINE W/O & W/DYE: CPT | Mod: TC

## 2024-05-16 PROCEDURE — 72158 MRI LUMBAR SPINE W/O & W/DYE: CPT | Mod: 26,,, | Performed by: RADIOLOGY

## 2024-05-16 PROCEDURE — A9585 GADOBUTROL INJECTION: HCPCS | Performed by: PHYSICIAN ASSISTANT

## 2024-05-16 PROCEDURE — 93356 MYOCRD STRAIN IMG SPCKL TRCK: CPT

## 2024-05-16 RX ORDER — GADOBUTROL 604.72 MG/ML
9 INJECTION INTRAVENOUS
Status: COMPLETED | OUTPATIENT
Start: 2024-05-16 | End: 2024-05-16

## 2024-05-16 RX ORDER — INSULIN PUMP SYRINGE, 3 ML
EACH MISCELLANEOUS
Qty: 1 EACH | Refills: 0 | Status: SHIPPED | OUTPATIENT
Start: 2024-05-16 | End: 2024-05-20 | Stop reason: SDUPTHER

## 2024-05-16 RX ORDER — SERTRALINE HYDROCHLORIDE 100 MG/1
100 TABLET, FILM COATED ORAL NIGHTLY
Qty: 90 TABLET | Refills: 1 | OUTPATIENT
Start: 2024-05-16

## 2024-05-16 RX ORDER — MECLIZINE HYDROCHLORIDE 25 MG/1
25 TABLET ORAL 3 TIMES DAILY PRN
Qty: 10 TABLET | Refills: 0 | Status: SHIPPED | OUTPATIENT
Start: 2024-05-16

## 2024-05-16 RX ORDER — LANCETS
EACH MISCELLANEOUS
Qty: 100 EACH | Refills: 0 | Status: SHIPPED | OUTPATIENT
Start: 2024-05-16

## 2024-05-16 RX ADMIN — GADOBUTROL 9 ML: 604.72 INJECTION INTRAVENOUS at 05:05

## 2024-05-17 ENCOUNTER — PATIENT MESSAGE (OUTPATIENT)
Dept: INTERNAL MEDICINE | Facility: CLINIC | Age: 69
End: 2024-05-17
Payer: COMMERCIAL

## 2024-05-17 ENCOUNTER — TELEPHONE (OUTPATIENT)
Dept: INTERNAL MEDICINE | Facility: CLINIC | Age: 69
End: 2024-05-17
Payer: COMMERCIAL

## 2024-05-17 DIAGNOSIS — R42 DIZZINESS: Primary | ICD-10-CM

## 2024-05-17 DIAGNOSIS — R51.9 GENERALIZED HEADACHES: ICD-10-CM

## 2024-05-17 DIAGNOSIS — F41.1 GAD (GENERALIZED ANXIETY DISORDER): ICD-10-CM

## 2024-05-17 RX ORDER — SERTRALINE HYDROCHLORIDE 100 MG/1
100 TABLET, FILM COATED ORAL NIGHTLY
Qty: 90 TABLET | Refills: 3 | Status: SHIPPED | OUTPATIENT
Start: 2024-05-17

## 2024-05-17 NOTE — TELEPHONE ENCOUNTER
4:03 PM called and spoke to patient. He is feeling a little better today. Will  his glucometer this afternoon. Will also get a BP cuff to monitor his home BP. He reports exertional SOB and significant fatigue still. Also reports that he ran out of sertraline one week ago and needs a refill -this could be contributing to HA but likely not the culprit for all of these symptoms. He is aware of ED prompts, I will place a neuro referral for dizziness and HAs. If he reports persistent hypotension, will also refer to cardiology. He is aware of and amenable to plan. -ALEE BURKETT

## 2024-05-20 ENCOUNTER — TELEPHONE (OUTPATIENT)
Dept: INTERNAL MEDICINE | Facility: CLINIC | Age: 69
End: 2024-05-20
Payer: COMMERCIAL

## 2024-05-20 DIAGNOSIS — E11.9 CONTROLLED TYPE 2 DIABETES MELLITUS WITHOUT COMPLICATION, WITHOUT LONG-TERM CURRENT USE OF INSULIN: ICD-10-CM

## 2024-05-20 RX ORDER — INSULIN PUMP SYRINGE, 3 ML
EACH MISCELLANEOUS
Qty: 1 EACH | Refills: 0 | Status: SHIPPED | OUTPATIENT
Start: 2024-05-20 | End: 2025-05-20

## 2024-05-20 RX ORDER — LANCETS
EACH MISCELLANEOUS
Qty: 100 EACH | Refills: 0 | Status: SHIPPED | OUTPATIENT
Start: 2024-05-20

## 2024-05-20 NOTE — TELEPHONE ENCOUNTER
4:00 PM called and spoke to patient - he is feeling much better. We talked about stopping metformin but because he is feeling better, will continue as it for now. Will try to get pt glucometer so that he can monitor his BS from home. He will follow-up with PCP on Thursday as planned. -ALEE BURKETT

## 2024-05-23 ENCOUNTER — OFFICE VISIT (OUTPATIENT)
Dept: INTERNAL MEDICINE | Facility: CLINIC | Age: 69
End: 2024-05-23
Attending: INTERNAL MEDICINE
Payer: COMMERCIAL

## 2024-05-23 VITALS
WEIGHT: 184.75 LBS | OXYGEN SATURATION: 98 % | HEART RATE: 73 BPM | DIASTOLIC BLOOD PRESSURE: 68 MMHG | HEIGHT: 74 IN | SYSTOLIC BLOOD PRESSURE: 106 MMHG | BODY MASS INDEX: 23.71 KG/M2

## 2024-05-23 DIAGNOSIS — E11.9 CONTROLLED TYPE 2 DIABETES MELLITUS WITHOUT COMPLICATION, WITHOUT LONG-TERM CURRENT USE OF INSULIN: ICD-10-CM

## 2024-05-23 DIAGNOSIS — Z23 NEED FOR STREPTOCOCCUS PNEUMONIAE VACCINATION: ICD-10-CM

## 2024-05-23 DIAGNOSIS — B34.9 VIRAL ILLNESS: Primary | ICD-10-CM

## 2024-05-23 DIAGNOSIS — N18.31 STAGE 3A CHRONIC KIDNEY DISEASE: ICD-10-CM

## 2024-05-23 PROCEDURE — 99999 PR PBB SHADOW E&M-EST. PATIENT-LVL IV: CPT | Mod: PBBFAC,,, | Performed by: INTERNAL MEDICINE

## 2024-05-23 PROCEDURE — 90677 PCV20 VACCINE IM: CPT | Mod: S$GLB,,, | Performed by: INTERNAL MEDICINE

## 2024-05-23 PROCEDURE — 90471 IMMUNIZATION ADMIN: CPT | Mod: S$GLB,,, | Performed by: INTERNAL MEDICINE

## 2024-05-23 PROCEDURE — 99214 OFFICE O/P EST MOD 30 MIN: CPT | Mod: 25,S$GLB,, | Performed by: INTERNAL MEDICINE

## 2024-05-23 NOTE — PROGRESS NOTES
Subjective:       Patient ID: Lewis Fish is a 68 y.o. male.    Chief Complaint: Fatigue     Lewis Fish is a 68 y.o.  male who presents for Fatigue  .  Reports last week he felt sweaty and weak, stood up and felt that he would pass out. He ate a piece of desert and he felt better. He reports symptoms of hypoglycemia in his early twentied but had not felt this in years.  He continued with significant fatigue, chills alternating with sweats, headache, low back pain.  No photophobia.  + myalgias.  He began to improve a few days late and symptoms have now resolved.     Over the past 6 months he has been feeling lightheaded and SOB when initiating any type of exertion. He stopped metformin three days ago and reports that his symptoms have gradual and now completely resolved.      Has historyof diabetes which he was taking metformin and working umair diet and exercise.  Has intentionally lost wt, avoiding excess carbs and exercising.        Review of Systems    Patient Active Problem List   Diagnosis    Prostate cancer    Atypical nevus    ADHD (attention deficit hyperactivity disorder)    CHEO (generalized anxiety disorder)    Varicose veins of both legs with edema    Controlled type 2 diabetes mellitus without complication, without long-term current use of insulin    Stage 3a chronic kidney disease         Past Medical History:   Diagnosis Date    ADHD (attention deficit hyperactivity disorder)     Allergy seasonal    Anxiety     Controlled diabetes mellitus type 1 without complications 4/4/2023    Depression     Family history of early CAD     brother with CAD age 49    Gilbert syndrome     History of psychiatric care     Prostate cancer prostate    bradytherapy    Psychiatric problem     Therapy        Past Surgical History:   Procedure Laterality Date    ANKLE LIGAMENT RECONSTRUCTION  2014    right    ARTHROSCOPY,SHOULDER,WITH BICEPS TENODESIS Left 2/17/2023    Procedure: ARTHROSCOPY,SHOULDER,WITH BICEPS  "TENODESIS;  Surgeon: Claude S. Williams IV, MD;  Location: [facility];  Service: Orthopedics;  Laterality: Left;    COLONOSCOPY N/A 5/12/2022    Procedure: COLONOSCOPY;  Surgeon: Sid Ko MD;  Location: [facility] ENDO (4TH FLR);  Service: Endoscopy;  Laterality: N/A;  fully vaccinated -   instructions emailed-     HERNIA REPAIR  1996    inguinal bilateral    PROSTATE SURGERY  2008    brachytherapy    ROTATOR CUFF REPAIR Left 2/17/2023    Procedure: REPAIR, ROTATOR CUFF;  Surgeon: Claude S. Williams IV, MD;  Location: [facility];  Service: Orthopedics;  Laterality: Left;    SHOULDER ARTHROSCOPY Left 2/17/2023    Procedure: ARTHROSCOPY, SHOULDER;  Surgeon: Claude S. Williams IV, MD;  Location: [facility];  Service: Orthopedics;  Laterality: Left;    testicle removed      complication of hernia repair    TUMOR REMOVAL  1996    nail bed       Family History   Problem Relation Name Age of Onset    Arthritis Mother Sunshine Fish     Skin cancer Mother Sunshine Fish     Asthma Brother Avni Fish     Alcohol abuse Maternal Grandfather Mr. Antoine     Alcohol abuse Paternal Grandmother Venus Fish     Skin cancer Maternal Grandmother      Macular degeneration Maternal Grandmother      Coronary artery disease Brother  49    Drug abuse Other nephew     Glaucoma Neg Hx      Strabismus Neg Hx      Stroke Neg Hx      Blindness Neg Hx         Social History     Tobacco Use    Smoking status: Never    Smokeless tobacco: Never   Substance Use Topics    Alcohol use: Yes     Alcohol/week: 1.0 standard drink of alcohol     Types: 1 Cans of beer per week     Comment: less than one a week    Drug use: Never       Objective:   Blood pressure 106/68, pulse 73, height 6' 2" (1.88 m), weight 83.8 kg (184 lb 11.9 oz), SpO2 98%.     Physical Exam  Constitutional:       Appearance: Normal appearance. He is well-developed. He is not ill-appearing.   HENT:      Head: Normocephalic and atraumatic.   Eyes:      General: No scleral icterus.     " Conjunctiva/sclera: Conjunctivae normal.   Cardiovascular:      Rate and Rhythm: Normal rate.      Heart sounds: Normal heart sounds. No murmur heard.     No friction rub. No gallop.   Pulmonary:      Effort: Pulmonary effort is normal.      Breath sounds: Normal breath sounds. No wheezing or rales.   Chest:      Chest wall: No tenderness.   Abdominal:      Palpations: Abdomen is soft.   Musculoskeletal:         General: No tenderness or signs of injury.   Skin:     General: Skin is warm and dry.   Neurological:      Mental Status: He is alert and oriented to person, place, and time. Mental status is at baseline.   Psychiatric:         Behavior: Behavior normal.         Thought Content: Thought content normal.         Prior labs reviewed  Assessment/Plan:       1. Viral illness  Resolved    2. Controlled type 2 diabetes mellitus without complication, without long-term current use of insulin  Overview:  One hba1c at 6.5 2/23  Recent surgery may have increased glucose numbers  followed healthy diet, reduced sweets, cont'd exercise  7/23 labs at work hba1c now 5.8  Cont healthy lifestyle, start metformin  10/23 hba1c wnl, on metformin 500 mg daily, no hypoglycemia  5/24 symptomatic on metfromin after wt loss  Stop metformin, repeat labs in 3 mo  4/24 illness with improvement off metfromin  Cont off medication, healthy lifestyle             Orders:  -     Hemoglobin A1C; Future; Expected date: 08/21/2024    3. Stage 3a chronic kidney disease  Overview:  avoid nsaids, increase po water intake  avoid all nephrotoxins, cont to keep BP low (no HTN) and diabetes controlled  ? Ten vs ckd, repeat labs  Orders:  -     Basic Metabolic Panel; Future; Expected date: 06/22/2024  -     Comprehensive Metabolic Panel; Future; Expected date: 08/23/2024    4. Need for Streptococcus pneumoniae vaccination  -     pneumoc 20-ernestina conj-dip cr(PF) (PREVNAR-20 (PF)) injection Syrg 0.5 mL             Visit today included increased complexity  associated with the care of the episodic problems and addressed and managing the longitudinal care of the patient due to the serious and/or complex managed problem(s) as above.     30 min spent in care of patient including history, physical, chart review, orders and coordination of care.     Medication List with Changes/Refills   Current Medications    BLOOD SUGAR DIAGNOSTIC STRP    To check BG 3 times daily, to use with insurance preferred meter    BLOOD SUGAR DIAGNOSTIC STRP    To check BG 3 times daily, to use with insurance preferred meter    BLOOD-GLUCOSE METER KIT    To check BG 3 times daily, to use with insurance preferred meter    BLOOD-GLUCOSE METER KIT    To check BG 3 times daily, to use with insurance preferred meter    CETIRIZINE (ZYRTEC) 10 MG TABLET    Take 10 mg by mouth once daily.    DEXTROAMPHETAMINE-AMPHETAMINE (ADDERALL XR) 20 MG 24 HR CAPSULE    Take 20 mg by mouth every morning.    DEXTROAMPHETAMINE-AMPHETAMINE (ADDERALL XR) 30 MG 24 HR CAPSULE    Take 30 mg by mouth every morning.    FLUTICASONE PROPIONATE (FLONASE) 50 MCG/ACTUATION NASAL SPRAY    1 spray by Each Nostril route once daily.    LANCETS MISC    To check BG 3 times daily, to use with insurance preferred meter    LANCETS MISC    To check BG 3 times daily, to use with insurance preferred meter    MECLIZINE (ANTIVERT) 25 MG TABLET    Take 1 tablet (25 mg total) by mouth 3 (three) times daily as needed for Dizziness.    METFORMIN (GLUCOPHAGE) 500 MG TABLET    Take 1 tablet (500 mg total) by mouth daily with breakfast.    ROSUVASTATIN (CRESTOR) 20 MG TABLET    Take 1 tablet (20 mg total) by mouth every evening.    SERTRALINE (ZOLOFT) 100 MG TABLET    Take 1 tablet (100 mg total) by mouth every evening.    TADALAFIL (CIALIS) 20 MG TAB    Take 1 tablet (20 mg total) by mouth every 72 hours as needed (ED). Take at least 2 hours before activity    TAMSULOSIN (FLOMAX) 0.4 MG CAP    TAKE 1 CAPSULE BY MOUTH EVERY DAY

## 2024-05-23 NOTE — Clinical Note
symptomatic on metfromin after wt loss Stop metformin, repeat labs in 3 mo  He looks great- back to baseline, seeing you in 3, then can see me in 6 for regular follow up

## 2024-06-10 ENCOUNTER — PATIENT MESSAGE (OUTPATIENT)
Dept: INTERNAL MEDICINE | Facility: CLINIC | Age: 69
End: 2024-06-10
Payer: COMMERCIAL

## 2024-06-12 PROBLEM — Z86.73 PRIOR CEREBELLAR INFARCT WITHOUT LATE EFFECT: Status: ACTIVE | Noted: 2024-06-12

## 2024-06-25 ENCOUNTER — PATIENT MESSAGE (OUTPATIENT)
Dept: INTERNAL MEDICINE | Facility: CLINIC | Age: 69
End: 2024-06-25
Payer: COMMERCIAL

## 2024-07-01 ENCOUNTER — OFFICE VISIT (OUTPATIENT)
Dept: INTERNAL MEDICINE | Facility: CLINIC | Age: 69
End: 2024-07-01
Payer: COMMERCIAL

## 2024-07-01 ENCOUNTER — LAB VISIT (OUTPATIENT)
Dept: LAB | Facility: HOSPITAL | Age: 69
End: 2024-07-01
Payer: COMMERCIAL

## 2024-07-01 VITALS
HEIGHT: 74 IN | HEART RATE: 73 BPM | WEIGHT: 185.44 LBS | OXYGEN SATURATION: 96 % | DIASTOLIC BLOOD PRESSURE: 60 MMHG | SYSTOLIC BLOOD PRESSURE: 110 MMHG | BODY MASS INDEX: 23.8 KG/M2

## 2024-07-01 DIAGNOSIS — N18.31 STAGE 3A CHRONIC KIDNEY DISEASE: ICD-10-CM

## 2024-07-01 DIAGNOSIS — R42 VERTIGO: Primary | ICD-10-CM

## 2024-07-01 DIAGNOSIS — R42 VERTIGO: ICD-10-CM

## 2024-07-01 LAB
ALBUMIN SERPL BCP-MCNC: 4.1 G/DL (ref 3.5–5.2)
ALP SERPL-CCNC: 52 U/L (ref 55–135)
ALT SERPL W/O P-5'-P-CCNC: 16 U/L (ref 10–44)
ANION GAP SERPL CALC-SCNC: 7 MMOL/L (ref 8–16)
AST SERPL-CCNC: 17 U/L (ref 10–40)
BASOPHILS # BLD AUTO: 0.03 K/UL (ref 0–0.2)
BASOPHILS NFR BLD: 0.8 % (ref 0–1.9)
BILIRUB SERPL-MCNC: 1.4 MG/DL (ref 0.1–1)
BUN SERPL-MCNC: 32 MG/DL (ref 8–23)
CALCIUM SERPL-MCNC: 8.9 MG/DL (ref 8.7–10.5)
CHLORIDE SERPL-SCNC: 107 MMOL/L (ref 95–110)
CO2 SERPL-SCNC: 24 MMOL/L (ref 23–29)
CREAT SERPL-MCNC: 1.6 MG/DL (ref 0.5–1.4)
DIFFERENTIAL METHOD BLD: ABNORMAL
EOSINOPHIL # BLD AUTO: 0.1 K/UL (ref 0–0.5)
EOSINOPHIL NFR BLD: 2 % (ref 0–8)
ERYTHROCYTE [DISTWIDTH] IN BLOOD BY AUTOMATED COUNT: 13.5 % (ref 11.5–14.5)
EST. GFR  (NO RACE VARIABLE): 47.2 ML/MIN/1.73 M^2
GLUCOSE SERPL-MCNC: 137 MG/DL (ref 70–110)
HCT VFR BLD AUTO: 40.5 % (ref 40–54)
HGB BLD-MCNC: 13.5 G/DL (ref 14–18)
IMM GRANULOCYTES # BLD AUTO: 0 K/UL (ref 0–0.04)
IMM GRANULOCYTES NFR BLD AUTO: 0 % (ref 0–0.5)
LYMPHOCYTES # BLD AUTO: 0.9 K/UL (ref 1–4.8)
LYMPHOCYTES NFR BLD: 23.8 % (ref 18–48)
MCH RBC QN AUTO: 28.7 PG (ref 27–31)
MCHC RBC AUTO-ENTMCNC: 33.3 G/DL (ref 32–36)
MCV RBC AUTO: 86 FL (ref 82–98)
MONOCYTES # BLD AUTO: 0.3 K/UL (ref 0.3–1)
MONOCYTES NFR BLD: 6.4 % (ref 4–15)
NEUTROPHILS # BLD AUTO: 2.6 K/UL (ref 1.8–7.7)
NEUTROPHILS NFR BLD: 67 % (ref 38–73)
NRBC BLD-RTO: 0 /100 WBC
PLATELET # BLD AUTO: 136 K/UL (ref 150–450)
PMV BLD AUTO: 9.8 FL (ref 9.2–12.9)
POTASSIUM SERPL-SCNC: 3.6 MMOL/L (ref 3.5–5.1)
PROT SERPL-MCNC: 6.3 G/DL (ref 6–8.4)
RBC # BLD AUTO: 4.71 M/UL (ref 4.6–6.2)
SODIUM SERPL-SCNC: 138 MMOL/L (ref 136–145)
WBC # BLD AUTO: 3.91 K/UL (ref 3.9–12.7)

## 2024-07-01 PROCEDURE — 80053 COMPREHEN METABOLIC PANEL: CPT | Performed by: PHYSICIAN ASSISTANT

## 2024-07-01 PROCEDURE — 36415 COLL VENOUS BLD VENIPUNCTURE: CPT | Performed by: PHYSICIAN ASSISTANT

## 2024-07-01 PROCEDURE — 85025 COMPLETE CBC W/AUTO DIFF WBC: CPT | Performed by: PHYSICIAN ASSISTANT

## 2024-07-01 PROCEDURE — 99999 PR PBB SHADOW E&M-EST. PATIENT-LVL IV: CPT | Mod: PBBFAC,,, | Performed by: PHYSICIAN ASSISTANT

## 2024-07-01 PROCEDURE — 99214 OFFICE O/P EST MOD 30 MIN: CPT | Mod: S$GLB,,, | Performed by: PHYSICIAN ASSISTANT

## 2024-07-01 NOTE — LETTER
July 1, 2024      [facility]  [address]  [facility] [address]  Phone: [phone]  Fax: [phone]       Patient: Lewis Fish   YOB: 1955  Date of Visit: 07/01/2024    To Whom It May Concern:    FLYNN Fish  was at [facility] on 07/01/2024. The patient may return to work on 7/1/2024 with no restrictions. If you have any questions or concerns, or if I can be of further assistance, please do not hesitate to contact me.    Sincerely,        Ashleigh Donahue PA-C

## 2024-07-01 NOTE — PROGRESS NOTES
INTERNAL MEDICINE PROGRESS NOTE    CHIEF COMPLAINT     Chief Complaint   Patient presents with    Follow-up       HPI     Lewis Fish is a 68 y.o. male who presents for an Follow-up visit today.    Patient presents for hospital follow-up. He was seen in the ED at [facility] in [place] on 6/25/2024.     HPI from ED:   66 y.o male was brought in by EMS from a hotel for evaluation of acute onset dizziness that began at 6:30 AM this morning. Patient awoke this morning to use the restroom when he endorsed room-spinning dizziness w/ standing. Patient fell onto his knees, caught himself against the bed, and waited for his sxs to pass. He did not hit his head or lose consciousness. Patient was then able to stand, get to the restroom, and get himself back into bed w/o difficulty. However, upon waking later in the morning, patient sat up in bed and endorsed the same room-spinning dizziness again. Patient had associated diaphoresis and nausea, but no chest pain, palpitations, shortness of breath, or blurred / double visions. No recent upper respiratory illnesses, cough, fevers, chills, urinary or bowel changes. No recent falls, trauma, MVC.     Physical Exam from ED visit:   Comments: Positive Negrito-Hallpike on the right. Performed Epley maneuver. No facial asymmetry. No dysmetria. Sensation intact in all extremities. Motor function 5/5 in all extremities.     Labs in the ED shows Cr elevated at 1.6, BUN elevated at 27, eGFR low at 48.   EKG showed NSR with rate of 55    Non-con CTH - normal  CTA of head and neck - normal   Troponin - negative     Symptoms improved with Meclizine in the ED -     Today in clinic he is feeling well -dizziness has resolved. He has no new symptoms.        Past Medical History:  Past Medical History:   Diagnosis Date    ADHD (attention deficit hyperactivity disorder)     Allergy seasonal    Anxiety     Controlled diabetes mellitus type 1 without complications 4/4/2023     Depression     Family history of early CAD     brother with CAD age 49    Gilbert syndrome     History of psychiatric care     Prostate cancer prostate    bradytherapy    Psychiatric problem     Therapy        Home Medications:  Prior to Admission medications    Medication Sig Start Date End Date Taking? Authorizing Provider   blood sugar diagnostic Strp To check BG 3 times daily, to use with insurance preferred meter  Patient not taking: Reported on 5/23/2024 5/20/24   Ashleigh Donahue PA-C   blood sugar diagnostic Strp To check BG 3 times daily, to use with insurance preferred meter  Patient not taking: Reported on 5/23/2024 5/20/24   Ashleigh Donahue PA-C   blood-glucose meter kit To check BG 3 times daily, to use with insurance preferred meter  Patient not taking: Reported on 5/23/2024 5/20/24 5/20/25  Ashleigh Donahue PA-C   blood-glucose meter kit To check BG 3 times daily, to use with insurance preferred meter  Patient not taking: Reported on 5/23/2024 5/20/24 5/20/25  Ashleigh Donahue PA-C   cetirizine (ZYRTEC) 10 MG tablet Take 10 mg by mouth once daily.    Provider, Historical   dextroamphetamine-amphetamine (ADDERALL XR) 20 MG 24 hr capsule Take 20 mg by mouth every morning. 1/6/22   Provider, Historical   dextroamphetamine-amphetamine (ADDERALL XR) 30 MG 24 hr capsule Take 30 mg by mouth every morning.    Provider, Historical   fluticasone propionate (FLONASE) 50 mcg/actuation nasal spray 1 spray by Each Nostril route once daily.    Provider, Historical   lancets Misc To check BG 3 times daily, to use with insurance preferred meter  Patient not taking: Reported on 5/23/2024 5/16/24   Ashleigh Donahue PA-C   lancets Misc To check BG 3 times daily, to use with insurance preferred meter  Patient not taking: Reported on 5/23/2024 5/20/24   Ashleigh Donahue PA-C   meclizine (ANTIVERT) 25 mg tablet Take 1 tablet (25 mg total) by mouth 3 (three) times daily as needed for Dizziness.  Patient not  taking: Reported on 5/23/2024 5/16/24   Ashleigh Donahue PA-C   metFORMIN (GLUCOPHAGE) 500 MG tablet Take 1 tablet (500 mg total) by mouth daily with breakfast.  Patient not taking: Reported on 5/23/2024 7/20/23 7/19/24  Arlene Finch MD   rosuvastatin (CRESTOR) 20 MG tablet Take 1 tablet (20 mg total) by mouth every evening. 4/8/24   Arlene Finch MD   sertraline (ZOLOFT) 100 MG tablet Take 1 tablet (100 mg total) by mouth every evening. 5/17/24   Ashleigh Donahue PA-C   tadalafiL (CIALIS) 20 MG Tab Take 1 tablet (20 mg total) by mouth every 72 hours as needed (ED). Take at least 2 hours before activity 4/19/24 4/19/25  Jagruti Castillo, NP   tamsulosin (FLOMAX) 0.4 mg Cap TAKE 1 CAPSULE BY MOUTH EVERY DAY 3/1/24   Jagruti Castillo NP       Review of Systems:  Review of Systems   Constitutional:  Negative for activity change, chills, fever and unexpected weight change.   HENT:  Negative for hearing loss, rhinorrhea, sore throat and trouble swallowing.    Eyes:  Negative for discharge and visual disturbance.   Respiratory:  Negative for cough, chest tightness, shortness of breath and wheezing.    Cardiovascular:  Negative for chest pain and palpitations.   Gastrointestinal:  Negative for abdominal pain, blood in stool, constipation, diarrhea, nausea and vomiting.   Endocrine: Negative for polydipsia and polyuria.   Genitourinary:  Negative for difficulty urinating, dysuria, flank pain, hematuria and urgency.   Musculoskeletal:  Negative for arthralgias, back pain, joint swelling, neck pain and neck stiffness.   Skin:  Negative for rash.   Neurological:  Negative for dizziness, syncope, weakness and headaches.   Psychiatric/Behavioral:  Negative for confusion and dysphoric mood.        Health Maintainence:   Immunizations:  Health Maintenance         Date Due Completion Date    RSV Vaccine (Age 60+ and Pregnant patients) (1 - 1-dose 60+ series) Never done ---    COVID-19 Vaccine (7 - 2023-24 season)  "12/15/2023 10/20/2023    Influenza Vaccine (1) 09/01/2024 10/20/2023    Hemoglobin A1c 10/01/2024 4/1/2024    Foot Exam 10/05/2024 10/5/2023    Eye Exam 01/17/2025 1/17/2024    Override on 1/17/2024: Done    Diabetes Urine Screening 04/01/2025 4/1/2024    Lipid Panel 04/01/2025 4/1/2024    Low Dose Statin 05/23/2025 5/23/2024    Colorectal Cancer Screening 05/12/2029 5/12/2022    Override on 7/12/2011: Done    TETANUS VACCINE 03/23/2032 3/23/2022             PHYSICAL EXAM     /60   Pulse 73   Ht 6' 2" (1.88 m)   Wt 84.1 kg (185 lb 6.5 oz)   SpO2 96%   BMI 23.80 kg/m²     Physical Exam  Vitals and nursing note reviewed.   Constitutional:       Appearance: Normal appearance.      Comments: Healthy appearing male in NAD or apparent pain. He makes good eye contact, speaks in clear full sentences and ambulates with ease.          HENT:      Head: Normocephalic and atraumatic.      Nose: Nose normal.      Mouth/Throat:      Pharynx: Oropharynx is clear.   Eyes:      Conjunctiva/sclera: Conjunctivae normal.   Cardiovascular:      Rate and Rhythm: Normal rate and regular rhythm.      Pulses: Normal pulses.   Pulmonary:      Effort: No respiratory distress.   Abdominal:      Tenderness: There is no abdominal tenderness.   Musculoskeletal:         General: Normal range of motion.      Cervical back: No rigidity.   Skin:     General: Skin is warm and dry.      Capillary Refill: Capillary refill takes less than 2 seconds.      Findings: No rash.   Neurological:      General: No focal deficit present.      Mental Status: He is alert.      Gait: Gait normal.      Comments: Steady gait   No nystagmus      Psychiatric:         Mood and Affect: Mood normal.         LABS     Lab Results   Component Value Date    HGBA1C 5.5 04/01/2024     CMP  Sodium   Date Value Ref Range Status   05/15/2024 141 136 - 145 mmol/L Final     Potassium   Date Value Ref Range Status   05/15/2024 4.5 3.5 - 5.1 mmol/L Final     Chloride   Date " Value Ref Range Status   05/15/2024 108 95 - 110 mmol/L Final     CO2   Date Value Ref Range Status   05/15/2024 21 (L) 23 - 29 mmol/L Final     Glucose   Date Value Ref Range Status   05/15/2024 124 (H) 70 - 110 mg/dL Final     BUN   Date Value Ref Range Status   05/15/2024 32 (H) 8 - 23 mg/dL Final     Creatinine   Date Value Ref Range Status   05/15/2024 1.5 (H) 0.5 - 1.4 mg/dL Final     Calcium   Date Value Ref Range Status   05/15/2024 9.8 8.7 - 10.5 mg/dL Final     Total Protein   Date Value Ref Range Status   05/15/2024 7.0 6.0 - 8.4 g/dL Final     Albumin   Date Value Ref Range Status   05/15/2024 4.5 3.5 - 5.2 g/dL Final     Total Bilirubin   Date Value Ref Range Status   05/15/2024 2.5 (H) 0.1 - 1.0 mg/dL Final     Comment:     For infants and newborns, interpretation of results should be based  on gestational age, weight and in agreement with clinical  observations.    Premature Infant recommended reference ranges:  Up to 24 hours.............<8.0 mg/dL  Up to 48 hours............<12.0 mg/dL  3-5 days..................<15.0 mg/dL  6-29 days.................<15.0 mg/dL       Alkaline Phosphatase   Date Value Ref Range Status   05/15/2024 54 (L) 55 - 135 U/L Final     AST   Date Value Ref Range Status   05/15/2024 16 10 - 40 U/L Final     ALT   Date Value Ref Range Status   05/15/2024 17 10 - 44 U/L Final     Anion Gap   Date Value Ref Range Status   05/15/2024 12 8 - 16 mmol/L Final     eGFR if    Date Value Ref Range Status   01/07/2022 >60 >60 mL/min/1.73 m^2 Final     eGFR if non    Date Value Ref Range Status   01/07/2022 53 (A) >60 mL/min/1.73 m^2 Final     Comment:     Calculation used to obtain the estimated glomerular filtration  rate (eGFR) is the CKD-EPI equation.        Lab Results   Component Value Date    WBC 6.04 05/15/2024    HGB 15.1 05/15/2024    HCT 44.9 05/15/2024    MCV 86 05/15/2024     05/15/2024     Lab Results   Component Value Date    CHOL 104  (L) 04/01/2024    CHOL 160 03/30/2023    CHOL 171 04/07/2017     Lab Results   Component Value Date    HDL 44 04/01/2024    HDL 39 (L) 03/30/2023    HDL 35 (L) 04/07/2017     Lab Results   Component Value Date    LDLCALC 44.8 (L) 04/01/2024    LDLCALC 97.2 03/30/2023    LDLCALC 106.4 04/07/2017     Lab Results   Component Value Date    TRIG 76 04/01/2024    TRIG 119 03/30/2023    TRIG 148 04/07/2017     Lab Results   Component Value Date    CHOLHDL 42.3 04/01/2024    CHOLHDL 24.4 03/30/2023    CHOLHDL 20.5 04/07/2017     Lab Results   Component Value Date    TSH 1.459 10/12/2020       ASSESSMENT/PLAN     Lewis Fish is a 68 y.o. male     Lewis was seen today for follow-up. Will recheck labs to ensure renal function is at baseline. Plan to follow-up with ENT vs PT for vestibular therapy if symptoms return. He is aware of and amenable to plan.     Diagnoses and all orders for this visit:    Vertigo  -     COMPREHENSIVE METABOLIC PANEL; Future  -     CBC Auto Differential; Future    Stage 3a chronic kidney disease   -avoid nephrotoxic medications   -hydrate well      Patient was counseled on when and how to seek emergent care.       Ashleigh Donahue PA-C   Department of Internal Medicine - [facility]   2:00 PM

## 2024-08-20 ENCOUNTER — LAB VISIT (OUTPATIENT)
Dept: LAB | Facility: OTHER | Age: 69
End: 2024-08-20
Attending: INTERNAL MEDICINE
Payer: COMMERCIAL

## 2024-08-20 ENCOUNTER — PATIENT MESSAGE (OUTPATIENT)
Dept: INTERNAL MEDICINE | Facility: CLINIC | Age: 69
End: 2024-08-20
Payer: COMMERCIAL

## 2024-08-20 DIAGNOSIS — E11.9 CONTROLLED TYPE 2 DIABETES MELLITUS WITHOUT COMPLICATION, WITHOUT LONG-TERM CURRENT USE OF INSULIN: ICD-10-CM

## 2024-08-20 DIAGNOSIS — N18.31 STAGE 3A CHRONIC KIDNEY DISEASE: Primary | ICD-10-CM

## 2024-08-20 DIAGNOSIS — N18.31 STAGE 3A CHRONIC KIDNEY DISEASE: ICD-10-CM

## 2024-08-20 LAB
ALBUMIN SERPL BCP-MCNC: 4.2 G/DL (ref 3.5–5.2)
ALP SERPL-CCNC: 56 U/L (ref 55–135)
ALT SERPL W/O P-5'-P-CCNC: 19 U/L (ref 10–44)
ANION GAP SERPL CALC-SCNC: 8 MMOL/L (ref 8–16)
ANION GAP SERPL CALC-SCNC: 8 MMOL/L (ref 8–16)
AST SERPL-CCNC: 20 U/L (ref 10–40)
BILIRUB SERPL-MCNC: 1.7 MG/DL (ref 0.1–1)
BUN SERPL-MCNC: 32 MG/DL (ref 8–23)
BUN SERPL-MCNC: 32 MG/DL (ref 8–23)
CALCIUM SERPL-MCNC: 9.2 MG/DL (ref 8.7–10.5)
CALCIUM SERPL-MCNC: 9.2 MG/DL (ref 8.7–10.5)
CHLORIDE SERPL-SCNC: 111 MMOL/L (ref 95–110)
CHLORIDE SERPL-SCNC: 111 MMOL/L (ref 95–110)
CO2 SERPL-SCNC: 23 MMOL/L (ref 23–29)
CO2 SERPL-SCNC: 23 MMOL/L (ref 23–29)
CREAT SERPL-MCNC: 1.7 MG/DL (ref 0.5–1.4)
CREAT SERPL-MCNC: 1.7 MG/DL (ref 0.5–1.4)
EST. GFR  (NO RACE VARIABLE): 44 ML/MIN/1.73 M^2
EST. GFR  (NO RACE VARIABLE): 44 ML/MIN/1.73 M^2
ESTIMATED AVG GLUCOSE: 108 MG/DL (ref 68–131)
GLUCOSE SERPL-MCNC: 108 MG/DL (ref 70–110)
GLUCOSE SERPL-MCNC: 108 MG/DL (ref 70–110)
HBA1C MFR BLD: 5.4 % (ref 4–5.6)
POTASSIUM SERPL-SCNC: 3.9 MMOL/L (ref 3.5–5.1)
POTASSIUM SERPL-SCNC: 3.9 MMOL/L (ref 3.5–5.1)
PROT SERPL-MCNC: 6.6 G/DL (ref 6–8.4)
SODIUM SERPL-SCNC: 142 MMOL/L (ref 136–145)
SODIUM SERPL-SCNC: 142 MMOL/L (ref 136–145)

## 2024-08-20 PROCEDURE — 80053 COMPREHEN METABOLIC PANEL: CPT | Performed by: INTERNAL MEDICINE

## 2024-08-20 PROCEDURE — 83036 HEMOGLOBIN GLYCOSYLATED A1C: CPT | Performed by: INTERNAL MEDICINE

## 2024-08-20 NOTE — TELEPHONE ENCOUNTER
KFRE 5-Year: 0.9% at 8/20/2024  7:02 AM  Calculated from:  Serum Creatinine: 1.7 mg/dL at 8/20/2024  7:02 AM  Urine Albumin Creatinine Ratio: 5.9 ug/mg at 4/1/2024  7:01 AM  Age: 68 years  Sex: Male at 8/20/2024  7:02 AM  Has CKD-3 to CKD-5: Yes

## 2024-08-21 NOTE — TELEPHONE ENCOUNTER
Called and spoke to pt on the phone -- pt is AMAZING and got his US booked already so booked labs to be prior to US at [facility] tomorrow afternoon

## 2024-08-22 ENCOUNTER — HOSPITAL ENCOUNTER (OUTPATIENT)
Dept: RADIOLOGY | Facility: OTHER | Age: 69
Discharge: HOME | End: 2024-08-22
Attending: INTERNAL MEDICINE
Payer: COMMERCIAL

## 2024-08-22 DIAGNOSIS — N18.31 STAGE 3A CHRONIC KIDNEY DISEASE: ICD-10-CM

## 2024-08-22 PROCEDURE — 76770 US EXAM ABDO BACK WALL COMP: CPT | Mod: 26,,, | Performed by: RADIOLOGY

## 2024-08-22 PROCEDURE — 76770 US EXAM ABDO BACK WALL COMP: CPT | Mod: TC

## 2024-08-23 ENCOUNTER — OFFICE VISIT (OUTPATIENT)
Dept: INTERNAL MEDICINE | Facility: CLINIC | Age: 69
End: 2024-08-23
Payer: COMMERCIAL

## 2024-08-23 VITALS
HEART RATE: 75 BPM | SYSTOLIC BLOOD PRESSURE: 102 MMHG | WEIGHT: 187.19 LBS | HEIGHT: 74 IN | BODY MASS INDEX: 24.02 KG/M2 | OXYGEN SATURATION: 99 % | DIASTOLIC BLOOD PRESSURE: 62 MMHG

## 2024-08-23 DIAGNOSIS — N18.31 STAGE 3A CHRONIC KIDNEY DISEASE: Primary | ICD-10-CM

## 2024-08-23 PROCEDURE — 99999 PR PBB SHADOW E&M-EST. PATIENT-LVL III: CPT | Mod: PBBFAC,,, | Performed by: PHYSICIAN ASSISTANT

## 2024-08-23 RX ORDER — MECLIZINE HYDROCHLORIDE 25 MG/1
25 TABLET ORAL 3 TIMES DAILY PRN
Start: 2024-08-23

## 2024-08-23 NOTE — PROGRESS NOTES
INTERNAL MEDICINE PROGRESS NOTE    CHIEF COMPLAINT     Chief Complaint   Patient presents with    Follow-up     Test on 8/22       HPI     Lewis Fish is a 68 y.o. male who presents for an Follow-up visit today.    Patient presents for 3 months follow-up. He is continuing to have CKD unclear etiology. Will check US, SPEP and kappa light chains.    He is feeling well and has not had any vertigo.     We reviewed his daily med list and supplements - he reports that he has been taking tumeric capsules for the past year. Was attempting to decrease inflammation in knees. He will stop taking this and we will continue to follow renal function.       Past Medical History:  Past Medical History:   Diagnosis Date    ADHD (attention deficit hyperactivity disorder)     Allergy seasonal    Anxiety     Controlled diabetes mellitus type 1 without complications 4/4/2023    Depression     Family history of early CAD     brother with CAD age 49    Gilbert syndrome     History of psychiatric care     Prostate cancer prostate    bradytherapy    Psychiatric problem     Therapy        Home Medications:  Prior to Admission medications    Medication Sig Start Date End Date Taking? Authorizing Provider   blood sugar diagnostic Strp To check BG 3 times daily, to use with insurance preferred meter  Patient not taking: Reported on 5/23/2024 5/20/24   Ashleigh Donahue PA-C   blood sugar diagnostic Strp To check BG 3 times daily, to use with insurance preferred meter  Patient not taking: Reported on 5/23/2024 5/20/24   sAhleigh Donahue PA-C   blood-glucose meter kit To check BG 3 times daily, to use with insurance preferred meter  Patient not taking: Reported on 5/23/2024 5/20/24 5/20/25  Ashleigh Donahue PA-C   blood-glucose meter kit To check BG 3 times daily, to use with insurance preferred meter  Patient not taking: Reported on 5/23/2024 5/20/24 5/20/25  Ashleigh Donahue PA-C   cetirizine (ZYRTEC) 10 MG tablet Take 10 mg by mouth  once daily.    Provider, Historical   dextroamphetamine-amphetamine (ADDERALL XR) 20 MG 24 hr capsule Take 20 mg by mouth every morning. 1/6/22   Provider, Historical   dextroamphetamine-amphetamine (ADDERALL XR) 30 MG 24 hr capsule Take 30 mg by mouth every morning.    Provider, Historical   fluticasone propionate (FLONASE) 50 mcg/actuation nasal spray 1 spray by Each Nostril route once daily.    Provider, Historical   lancets Misc To check BG 3 times daily, to use with insurance preferred meter  Patient not taking: Reported on 5/23/2024 5/16/24   Ashleigh Donahue PA-C   lancets Misc To check BG 3 times daily, to use with insurance preferred meter  Patient not taking: Reported on 5/23/2024 5/20/24   Ashleigh Donahue PA-C   meclizine (ANTIVERT) 25 mg tablet Take 1 tablet (25 mg total) by mouth 3 (three) times daily as needed for Dizziness.  Patient not taking: Reported on 5/23/2024 5/16/24   Ashleigh Donahue PA-C   metFORMIN (GLUCOPHAGE) 500 MG tablet Take 1 tablet (500 mg total) by mouth daily with breakfast.  Patient not taking: Reported on 5/23/2024 7/20/23 7/19/24  Arlene Finch MD   rosuvastatin (CRESTOR) 20 MG tablet Take 1 tablet (20 mg total) by mouth every evening. 4/8/24   Arlene Finch MD   sertraline (ZOLOFT) 100 MG tablet Take 1 tablet (100 mg total) by mouth every evening. 5/17/24   Ashleigh Donahue PA-C   tadalafiL (CIALIS) 20 MG Tab Take 1 tablet (20 mg total) by mouth every 72 hours as needed (ED). Take at least 2 hours before activity 4/19/24 4/19/25  Jagruti Castillo NP   tamsulosin (FLOMAX) 0.4 mg Cap TAKE 1 CAPSULE BY MOUTH EVERY DAY 3/1/24   Jagruti Castillo NP       Review of Systems:  Review of Systems   Constitutional:  Negative for chills and fever.   HENT:  Negative for sore throat and trouble swallowing.    Eyes:  Negative for visual disturbance.   Respiratory:  Negative for cough and shortness of breath.    Cardiovascular:  Negative for chest pain.  "  Gastrointestinal:  Negative for abdominal pain, constipation, diarrhea, nausea and vomiting.   Genitourinary:  Negative for dysuria and flank pain.   Musculoskeletal:  Negative for back pain, neck pain and neck stiffness.   Skin:  Negative for rash.   Neurological:  Negative for dizziness, syncope, weakness and headaches.   Psychiatric/Behavioral:  Negative for confusion.        Health Maintainence:   Immunizations:  Health Maintenance         Date Due Completion Date    RSV Vaccine (Age 60+ and Pregnant patients) (1 - 1-dose 60+ series) Never done ---    COVID-19 Vaccine (7 - 2023-24 season) 12/15/2023 10/20/2023    Foot Exam 10/05/2024 10/5/2023    Influenza Vaccine (1) 09/01/2024 10/20/2023    Eye Exam 01/17/2025 1/17/2024    Override on 1/17/2024: Done    Hemoglobin A1c 02/20/2025 8/20/2024    Diabetes Urine Screening 04/01/2025 4/1/2024    Lipid Panel 04/01/2025 4/1/2024    Low Dose Statin 05/23/2025 5/23/2024    Colorectal Cancer Screening 05/12/2029 5/12/2022    Override on 7/12/2011: Done    TETANUS VACCINE 03/23/2032 3/23/2022             PHYSICAL EXAM     /62 (BP Location: Left arm, Patient Position: Sitting, BP Method: Large (Manual))   Pulse 75   Ht 6' 2" (1.88 m)   Wt 84.9 kg (187 lb 2.7 oz)   SpO2 99%   BMI 24.03 kg/m²     Physical Exam  Vitals and nursing note reviewed.   Constitutional:       Appearance: Normal appearance.      Comments: Healthy appearing male in NAD or apparent pain. He makes good eye contact, speaks in clear full sentences and ambulates with ease.    HENT:      Head: Normocephalic and atraumatic.      Nose: Nose normal.      Mouth/Throat:      Pharynx: Oropharynx is clear.   Eyes:      Conjunctiva/sclera: Conjunctivae normal.   Cardiovascular:      Rate and Rhythm: Normal rate and regular rhythm.      Pulses: Normal pulses.   Pulmonary:      Effort: No respiratory distress.   Abdominal:      Tenderness: There is no abdominal tenderness.   Musculoskeletal:         " General: Normal range of motion.      Cervical back: No rigidity.   Skin:     General: Skin is warm and dry.      Capillary Refill: Capillary refill takes less than 2 seconds.      Findings: No rash.   Neurological:      General: No focal deficit present.      Mental Status: He is alert.      Gait: Gait normal.   Psychiatric:         Mood and Affect: Mood normal.         LABS     Lab Results   Component Value Date    HGBA1C 5.4 08/20/2024     CMP  Sodium   Date Value Ref Range Status   08/20/2024 142 136 - 145 mmol/L Final   08/20/2024 142 136 - 145 mmol/L Final     Potassium   Date Value Ref Range Status   08/20/2024 3.9 3.5 - 5.1 mmol/L Final   08/20/2024 3.9 3.5 - 5.1 mmol/L Final     Chloride   Date Value Ref Range Status   08/20/2024 111 (H) 95 - 110 mmol/L Final   08/20/2024 111 (H) 95 - 110 mmol/L Final     CO2   Date Value Ref Range Status   08/20/2024 23 23 - 29 mmol/L Final   08/20/2024 23 23 - 29 mmol/L Final     Glucose   Date Value Ref Range Status   08/20/2024 108 70 - 110 mg/dL Final   08/20/2024 108 70 - 110 mg/dL Final     BUN   Date Value Ref Range Status   08/20/2024 32 (H) 8 - 23 mg/dL Final   08/20/2024 32 (H) 8 - 23 mg/dL Final     Creatinine   Date Value Ref Range Status   08/20/2024 1.7 (H) 0.5 - 1.4 mg/dL Final   08/20/2024 1.7 (H) 0.5 - 1.4 mg/dL Final     Calcium   Date Value Ref Range Status   08/20/2024 9.2 8.7 - 10.5 mg/dL Final   08/20/2024 9.2 8.7 - 10.5 mg/dL Final     Total Protein   Date Value Ref Range Status   08/20/2024 6.6 6.0 - 8.4 g/dL Final     Albumin   Date Value Ref Range Status   08/20/2024 4.2 3.5 - 5.2 g/dL Final     Total Bilirubin   Date Value Ref Range Status   08/20/2024 1.7 (H) 0.1 - 1.0 mg/dL Final     Comment:     For infants and newborns, interpretation of results should be based  on gestational age, weight and in agreement with clinical  observations.    Premature Infant recommended reference ranges:  Up to 24 hours.............<8.0 mg/dL  Up to 48  hours............<12.0 mg/dL  3-5 days..................<15.0 mg/dL  6-29 days.................<15.0 mg/dL       Alkaline Phosphatase   Date Value Ref Range Status   08/20/2024 56 55 - 135 U/L Final     AST   Date Value Ref Range Status   08/20/2024 20 10 - 40 U/L Final     ALT   Date Value Ref Range Status   08/20/2024 19 10 - 44 U/L Final     Anion Gap   Date Value Ref Range Status   08/20/2024 8 8 - 16 mmol/L Final   08/20/2024 8 8 - 16 mmol/L Final     eGFR if    Date Value Ref Range Status   01/07/2022 >60 >60 mL/min/1.73 m^2 Final     eGFR if non    Date Value Ref Range Status   01/07/2022 53 (A) >60 mL/min/1.73 m^2 Final     Comment:     Calculation used to obtain the estimated glomerular filtration  rate (eGFR) is the CKD-EPI equation.        Lab Results   Component Value Date    WBC 3.91 07/01/2024    HGB 13.5 (L) 07/01/2024    HCT 40.5 07/01/2024    MCV 86 07/01/2024     (L) 07/01/2024     Lab Results   Component Value Date    CHOL 104 (L) 04/01/2024    CHOL 160 03/30/2023    CHOL 171 04/07/2017     Lab Results   Component Value Date    HDL 44 04/01/2024    HDL 39 (L) 03/30/2023    HDL 35 (L) 04/07/2017     Lab Results   Component Value Date    LDLCALC 44.8 (L) 04/01/2024    LDLCALC 97.2 03/30/2023    LDLCALC 106.4 04/07/2017     Lab Results   Component Value Date    TRIG 76 04/01/2024    TRIG 119 03/30/2023    TRIG 148 04/07/2017     Lab Results   Component Value Date    CHOLHDL 42.3 04/01/2024    CHOLHDL 24.4 03/30/2023    CHOLHDL 20.5 04/07/2017     Lab Results   Component Value Date    TSH 1.459 10/12/2020       ASSESSMENT/PLAN     Lewis Fish is a 68 y.o. male     Lewis was seen today for follow-up. Stop taking tumeric supplement - this could be the culprit for CKD. He is not taking metformin and his A1C is in good range. Continue current regimen.     Diagnoses and all orders for this visit:    Stage 3a chronic kidney disease    Other orders  -      meclizine (ANTIVERT) 25 mg tablet; Take 1 tablet (25 mg total) by mouth 3 (three) times daily as needed for Dizziness.      Patient was counseled on when and how to seek emergent care.       Ashleigh Donahue PA-C   Department of Internal Medicine - [facility]   9:20 AM      Admission

## 2024-08-29 ENCOUNTER — PATIENT MESSAGE (OUTPATIENT)
Dept: INTERNAL MEDICINE | Facility: CLINIC | Age: 69
End: 2024-08-29
Payer: COMMERCIAL

## 2024-08-29 DIAGNOSIS — N20.0 NEPHROLITHIASIS: Primary | ICD-10-CM

## 2024-09-05 ENCOUNTER — OFFICE VISIT (OUTPATIENT)
Dept: UROLOGY | Facility: CLINIC | Age: 69
End: 2024-09-05
Payer: COMMERCIAL

## 2024-09-05 VITALS
HEIGHT: 74 IN | HEART RATE: 65 BPM | WEIGHT: 187.38 LBS | DIASTOLIC BLOOD PRESSURE: 80 MMHG | BODY MASS INDEX: 24.05 KG/M2 | SYSTOLIC BLOOD PRESSURE: 122 MMHG

## 2024-09-05 DIAGNOSIS — N52.36 ERECTILE DYSFUNCTION FOLLOWING INTERSTITIAL SEED THERAPY: Primary | ICD-10-CM

## 2024-09-05 DIAGNOSIS — C61 PROSTATE CANCER: ICD-10-CM

## 2024-09-05 DIAGNOSIS — E11.8 DM TYPE 2 CAUSING COMPLICATION: ICD-10-CM

## 2024-09-05 PROBLEM — N52.01 ERECTILE DYSFUNCTION DUE TO ARTERIAL INSUFFICIENCY: Status: ACTIVE | Noted: 2024-09-05

## 2024-09-05 PROCEDURE — 99214 OFFICE O/P EST MOD 30 MIN: CPT | Mod: S$GLB,,, | Performed by: UROLOGY

## 2024-09-05 PROCEDURE — 99999 PR PBB SHADOW E&M-EST. PATIENT-LVL IV: CPT | Mod: PBBFAC,,, | Performed by: UROLOGY

## 2024-09-05 RX ORDER — PAPAVERINE HYDROCHLORIDE 30 MG/ML
INJECTION INTRAMUSCULAR; INTRAVENOUS
Qty: 5 ML | Refills: 0 | Status: SHIPPED | OUTPATIENT
Start: 2024-09-05

## 2024-09-05 RX ORDER — TAMSULOSIN HYDROCHLORIDE 0.4 MG/1
1 CAPSULE ORAL DAILY
Qty: 90 CAPSULE | Refills: 3 | Status: SHIPPED | OUTPATIENT
Start: 2024-09-05

## 2024-09-05 NOTE — PROGRESS NOTES
CHIEF COMPLAINT:    Mr. Fish is a 68 y.o. male presenting for a consultation at the request of Dr. Finch. Patient presents with ED.    PRESENTING ILLNESS:    Lewis Fish is a 68 y.o. male with prostate cancer s/p brachytherpay in [place] ~2011.  Unknown jose score.    He underwent a renal u/s due to CKD which was read as possibly having small renal stones.  However, I interpreted this independently, and there are no stones of significance present.    He has severe ED. Has tried and failed Cialis and Viagra.     He has peyronie's disease.  Has been present for > 1 year.  He has ~ 30 degrees of curve to the L.  It is not difficult to penetrate due to the curve.       He has LUTS.  + decreased FOS.  Is on flomax and pleased with how he voids.    REVIEW OF SYSTEMS:    Lewis Fish denies headache, blurred vision, fever, nausea, vomiting, chills, abdominal pain, chest pain, sore throat, bleeding per rectum, cough, SOB, recent loss of consciousness, recent mental status changes, seizures, dizziness, or upper or lower extremity weakness.    JAKY  1. 1  2. 1  3. 1  4. 1  5. 1      PATIENT HISTORY:    Past Medical History:   Diagnosis Date    ADHD (attention deficit hyperactivity disorder)     Allergy seasonal    Anxiety     Controlled diabetes mellitus type 1 without complications 4/4/2023    Depression     Family history of early CAD     brother with CAD age 49    Gilbert syndrome     History of psychiatric care     Prostate cancer prostate    bradytherapy    Psychiatric problem     Therapy        Past Surgical History:   Procedure Laterality Date    ANKLE LIGAMENT RECONSTRUCTION  2014    right    ARTHROSCOPY,SHOULDER,WITH BICEPS TENODESIS Left 2/17/2023    Procedure: ARTHROSCOPY,SHOULDER,WITH BICEPS TENODESIS;  Surgeon: Claude S. Williams IV, MD;  Location: [facility] OR;  Service: Orthopedics;  Laterality: Left;    COLONOSCOPY N/A 5/12/2022    Procedure: COLONOSCOPY;  Surgeon: Sid Ko MD;  Location:  [facility] (4TH FLR);  Service: Endoscopy;  Laterality: N/A;  fully vaccinated -   instructions emailed-     HERNIA REPAIR  1996    inguinal bilateral    PROSTATE SURGERY  2008    brachytherapy    ROTATOR CUFF REPAIR Left 2/17/2023    Procedure: REPAIR, ROTATOR CUFF;  Surgeon: Claude S. Williams IV, MD;  Location: [facility];  Service: Orthopedics;  Laterality: Left;    SHOULDER ARTHROSCOPY Left 2/17/2023    Procedure: ARTHROSCOPY, SHOULDER;  Surgeon: Claude S. Williams IV, MD;  Location: [facility];  Service: Orthopedics;  Laterality: Left;    testicle removed      complication of hernia repair    TUMOR REMOVAL  1996    nail bed       Family History   Problem Relation Name Age of Onset    Arthritis Mother Sunshine Fish     Skin cancer Mother Sunshine Fish     Asthma Brother Avni Fish     Alcohol abuse Maternal Grandfather Mr. Antoine     Alcohol abuse Paternal Grandmother Venus Fish     Skin cancer Maternal Grandmother      Macular degeneration Maternal Grandmother      Coronary artery disease Brother  49    Drug abuse Other nephew     Glaucoma Neg Hx      Strabismus Neg Hx      Stroke Neg Hx      Blindness Neg Hx         Social History     Socioeconomic History    Marital status:      Spouse name: Cecilia    Number of children: 3   Occupational History    Occupation:    Tobacco Use    Smoking status: Never    Smokeless tobacco: Never   Substance and Sexual Activity    Alcohol use: Yes     Alcohol/week: 1.0 standard drink of alcohol     Types: 1 Cans of beer per week     Comment: less than one a week    Drug use: Never    Sexual activity: Yes     Partners: Female     Birth control/protection: Post-menopausal, See Surgical Hx   Other Topics Concern    Patient feels they ought to cut down on drinking/drug use No    Patient annoyed by others criticizing their drinking/drug use No    Patient has felt bad or guilty about drinking/drug use No    Patient has had a drink/used drugs as an eye opener in the  AM No   Social History Narrative    Walking and cross training 5-6 times weekly. Lives w wife, 3 kids grown.    Works @ [employer] as .          Lives with wife Cecilia, his 28 yo son Anshul, and two cats.     Social Determinants of Health     Financial Resource Strain: Low Risk  (6/25/2024)    Received from [facility]    Financial Resource Strain     Financial Concerns: none of these   Food Insecurity: Low Risk  (6/25/2024)    Received from [facility]    Food Insecurity     Within the past 12 months, you worried that your food would run out before you got money to buy more.: Never True     Within the past 12 months, the food you bought just didnt last and you didnt have money to get more.: Never True   Transportation Needs: Low Risk  (6/25/2024)    Received from [facility]    Transportation Needs     In the past 12 months, has lack of transportation kept you from medical appointments, meetings, work or from getting things needed for daily living?: No   Physical Activity: Sufficiently Active (5/16/2024)    Exercise Vital Sign     Days of Exercise per Week: 5 days     Minutes of Exercise per Session: 100 min   Stress: No Stress Concern Present (5/16/2024)    [employer] [place] [employer] - Occupational Stress Questionnaire     Feeling of Stress : Not at all   Housing Stability: Low Risk  (6/25/2024)    Received from [facility]    Housing Stability     What is your living situation today?: I have a steady place to live       Allergies:  Patient has no known allergies.    Medications:    Current Outpatient Medications:     cetirizine (ZYRTEC) 10 MG tablet, Take 10 mg by mouth once daily., Disp: , Rfl:     dextroamphetamine-amphetamine (ADDERALL XR) 20 MG 24 hr capsule, Take 20 mg by mouth every morning., Disp: , Rfl:     dextroamphetamine-amphetamine (ADDERALL XR) 30 MG 24 hr capsule, Take 30 mg by mouth every  morning., Disp: , Rfl:     fluticasone propionate (FLONASE) 50 mcg/actuation nasal spray, 1 spray by Each Nostril route once daily., Disp: , Rfl:     meclizine (ANTIVERT) 25 mg tablet, Take 1 tablet (25 mg total) by mouth 3 (three) times daily as needed for Dizziness., Disp: , Rfl:     rosuvastatin (CRESTOR) 20 MG tablet, Take 1 tablet (20 mg total) by mouth every evening., Disp: 90 tablet, Rfl: 1    sertraline (ZOLOFT) 100 MG tablet, Take 1 tablet (100 mg total) by mouth every evening., Disp: 90 tablet, Rfl: 3    tadalafiL (CIALIS) 20 MG Tab, Take 1 tablet (20 mg total) by mouth every 72 hours as needed (ED). Take at least 2 hours before activity, Disp: 30 tablet, Rfl: 3    tamsulosin (FLOMAX) 0.4 mg Cap, TAKE 1 CAPSULE BY MOUTH EVERY DAY, Disp: 90 capsule, Rfl: 3    PHYSICAL EXAMINATION:    The patient generally appears in good health, is appropriately interactive, and is in no apparent distress.     Eyes: anicteric sclerae, moist conjunctivae; no lid-lag; PERRLA     HENT: Atraumatic; oropharynx clear with moist mucous membranes and no mucosal ulcerations;normal hard and soft palate.  No evidence of lymphadenopathy.    Neck: Trachea midline.  No thyromegaly.    Skin: No lesions.    Mental: Cooperative with normal affect.  Is oriented to time, place, and person.    Neuro: Grossly intact.    Chest: Normal inspiratory effort.   No accessory muscles.  No audible wheezes.  Respirations symmetric on inspiration and expiration.    Heart: Regular rhythm.      Abdomen:  Soft, non-tender. No masses or organomegaly. Bladder is not palpable. No evidence of flank discomfort. No evidence of inguinal hernia.    Genitourinary: The penis is circumcised with no a plaque c/w peyronie's disease.  The urethral meatus is normal. The testes, epididymides, and cord structures are normal in size and contour on the R.  L testicle is atrophic.   The scrotum is normal in size and contour.    Extremities: No clubbing, cyanosis, or  edema      LABS:      Lab Results   Component Value Date    PSA 0.02 04/07/2017    PSA 0.04 01/28/2016    PSA 0.17 01/17/2014    PSADIAG <0.01 01/07/2022    PSADIAG <0.01 01/17/2019       IMPRESSION:    Encounter Diagnoses   Name Primary?    Erectile dysfunction following interstitial seed therapy Yes    Prostate cancer     DM type 2 causing complication          PLAN:    1. Discussed that renal u/s is a poor modality for showing small stones.  I do not feel that there are stones present on my review.  Will hold off on getting a CT as he's asymptomatic.  2.  He's PSA has remained undetectable > 10 years from his PCa.  3. Discussed options for his LUTS.  Should continue flomax.  Refilled  4. Discussed options for his severe ED (affected by above comorbidities).  He'd like ICI. Discussed that the recommended treatment for both ED and peyronie's is an IPP.  However, he'd like to try ICI understanding that it can worsen peyronie's.  Will set up for teaching. Side effects discussed.  A new Rx was given   5. RTC 3 months.    Copy to:

## 2024-09-05 NOTE — LETTER
September 5, 2024        Arlene Finch MD  [address] [facility] [address]             [facility] 4th Fl  [address]  [place] [address]  Phone: [phone]   Patient: Lewis Fish   MR Number: [id number]   YOB: 1955   Date of Visit: 9/5/2024       Dear Dr. Finch:    Thank you for referring Lewis Fish to me for evaluation. Attached you will find relevant portions of my assessment and plan of care.    If you have questions, please do not hesitate to call me. I look forward to following Lewis Fish along with you.    Sincerely,      Zach Guillen MD            CC  No Recipients    Enclosure

## 2024-10-15 RX ORDER — ROSUVASTATIN CALCIUM 20 MG/1
20 TABLET, COATED ORAL NIGHTLY
Qty: 90 TABLET | Refills: 1 | Status: SHIPPED | OUTPATIENT
Start: 2024-10-15

## 2024-10-15 NOTE — TELEPHONE ENCOUNTER
No care due was identified.  [facility] Embedded Care Due Messages. Reference number: [id number].   10/15/2024 5:05:38 PM CDT

## 2024-10-16 NOTE — TELEPHONE ENCOUNTER
Refill Decision Note   Lewis Fish  is requesting a refill authorization.  Brief Assessment and Rationale for Refill:  Approve     Medication Therapy Plan:         Comments:     Note composed:10:24 PM 10/15/2024

## 2025-02-27 ENCOUNTER — LAB VISIT (OUTPATIENT)
Dept: LAB | Facility: HOSPITAL | Age: 70
End: 2025-02-27
Attending: INTERNAL MEDICINE
Payer: COMMERCIAL

## 2025-02-27 ENCOUNTER — OFFICE VISIT (OUTPATIENT)
Dept: INTERNAL MEDICINE | Facility: CLINIC | Age: 70
End: 2025-02-27
Payer: COMMERCIAL

## 2025-02-27 VITALS
OXYGEN SATURATION: 99 % | BODY MASS INDEX: 24.45 KG/M2 | HEART RATE: 65 BPM | SYSTOLIC BLOOD PRESSURE: 116 MMHG | HEIGHT: 74 IN | WEIGHT: 190.5 LBS | DIASTOLIC BLOOD PRESSURE: 80 MMHG

## 2025-02-27 DIAGNOSIS — E11.9 ENCOUNTER FOR DIABETIC FOOT EXAM: ICD-10-CM

## 2025-02-27 DIAGNOSIS — D75.839 THROMBOCYTOSIS: ICD-10-CM

## 2025-02-27 DIAGNOSIS — N18.31 STAGE 3A CHRONIC KIDNEY DISEASE: ICD-10-CM

## 2025-02-27 DIAGNOSIS — F90.2 ATTENTION DEFICIT HYPERACTIVITY DISORDER (ADHD), COMBINED TYPE: ICD-10-CM

## 2025-02-27 DIAGNOSIS — Z11.59 MEASLES SCREENING: ICD-10-CM

## 2025-02-27 DIAGNOSIS — E11.9 CONTROLLED TYPE 2 DIABETES MELLITUS WITHOUT COMPLICATION, WITHOUT LONG-TERM CURRENT USE OF INSULIN: ICD-10-CM

## 2025-02-27 DIAGNOSIS — Z00.00 ANNUAL PHYSICAL EXAM: Primary | ICD-10-CM

## 2025-02-27 DIAGNOSIS — J30.2 SEASONAL ALLERGIC RHINITIS, UNSPECIFIED TRIGGER: ICD-10-CM

## 2025-02-27 DIAGNOSIS — K21.9 GASTROESOPHAGEAL REFLUX DISEASE, UNSPECIFIED WHETHER ESOPHAGITIS PRESENT: ICD-10-CM

## 2025-02-27 LAB
ALBUMIN/CREAT UR: NORMAL UG/MG (ref 0–30)
CREAT UR-MCNC: 69 MG/DL (ref 23–375)
MICROALBUMIN UR DL<=1MG/L-MCNC: <5 UG/ML

## 2025-02-27 PROCEDURE — 99397 PER PM REEVAL EST PAT 65+ YR: CPT | Mod: S$GLB,,, | Performed by: INTERNAL MEDICINE

## 2025-02-27 PROCEDURE — 82570 ASSAY OF URINE CREATININE: CPT | Performed by: INTERNAL MEDICINE

## 2025-02-27 PROCEDURE — 99999 PR PBB SHADOW E&M-EST. PATIENT-LVL IV: CPT | Mod: PBBFAC,,, | Performed by: INTERNAL MEDICINE

## 2025-02-27 RX ORDER — AZELASTINE 1 MG/ML
1 SPRAY, METERED NASAL 2 TIMES DAILY
Qty: 30 ML | Refills: 3 | Status: SHIPPED | OUTPATIENT
Start: 2025-02-27 | End: 2026-02-27

## 2025-02-27 NOTE — Clinical Note
Please let pt know recent labs showed improvement in kidney function.  I would like to repeat this platelet count in 3 months, please schedule labs in 3 mo, thanks!

## 2025-02-27 NOTE — PROGRESS NOTES
Internal Medicine Annual Exam       CHIEF COMPLAINT     The patient, Lewis Fish, who is a 69 y.o. male presents for an annual exam.    HPI     PCP is Arlene Finch MD, patient is known to me.     Pt is here for annual examination today and follow up of CKD. Pt reports compliant of postnasal drip x1-2 months. He takes flonase and zyrtec with mild relief. He has a history of chronic sinusitis in the past. Pt has no congestion, headaches, sinus pressure or pain today. He also reports history of GERD which is diet controlled. Pt has a flare of symptoms 1x q 6 months. Pt denies chest pain, shortness of breath, headaches, dizziness, visual changes or abdominal pain. Pt states normal frequent bowel movements.     Pt was seen in the ED on 1/4/25 and was found to have a ureteral kidney stone. Pt denies previous history of recurrent kidney stones prior to this. Pt denies symptoms or pain today. No hematuria, dysuria, urinary incontinence.    Pt also presents for follow up on CKD3. Pt's most recent GFR on hospitalization was 47. Pt reports good water intake. Pt has stopped taking tumeric since last visit.  KFRE 5-Year: 0.3% at 2/28/2025  7:23 AM  Calculated from:  Serum Creatinine: 1.4 mg/dL at 2/28/2025  7:23 AM  Urine Albumin Creatinine Ratio: 5.9 ug/mg at 4/1/2024  7:01 AM  Age: 69 years  Sex: Male at 2/28/2025  7:23 AM  Has CKD-3 to CKD-5: Yes      Health Maintenance:  Flu and covid completed in oct 24  Optomety apt scheduled  Diabetic foot exam due   No smoking history  Drinks 1 beet/wk      Past Medical History:  Past Medical History:   Diagnosis Date    ADHD (attention deficit hyperactivity disorder)     Allergy seasonal    Anxiety     Controlled diabetes mellitus type 1 without complications 4/4/2023    Depression     Family history of early CAD     brother with CAD age 49    Gilbert syndrome     History of psychiatric care     Prostate cancer prostate    bradytherapy    Psychiatric problem     Therapy         Past Surgical History:   Procedure Laterality Date    ANKLE LIGAMENT RECONSTRUCTION  2014    right    ARTHROSCOPY,SHOULDER,WITH BICEPS TENODESIS Left 2/17/2023    Procedure: ARTHROSCOPY,SHOULDER,WITH BICEPS TENODESIS;  Surgeon: Claude S. Williams IV, MD;  Location: [facility];  Service: Orthopedics;  Laterality: Left;    COLONOSCOPY N/A 5/12/2022    Procedure: COLONOSCOPY;  Surgeon: Sid Ko MD;  Location: [facility] ENDO (4TH FLR);  Service: Endoscopy;  Laterality: N/A;  fully vaccinated -   instructions emailed-     HERNIA REPAIR  1996    inguinal bilateral    PROSTATE SURGERY  2008    brachytherapy    ROTATOR CUFF REPAIR Left 2/17/2023    Procedure: REPAIR, ROTATOR CUFF;  Surgeon: Claude S. Williams IV, MD;  Location: [facility] OR;  Service: Orthopedics;  Laterality: Left;    SHOULDER ARTHROSCOPY Left 2/17/2023    Procedure: ARTHROSCOPY, SHOULDER;  Surgeon: Claude S. Williams IV, MD;  Location: [facility];  Service: Orthopedics;  Laterality: Left;    testicle removed      complication of hernia repair    TUMOR REMOVAL  1996    nail bed        Family History   Problem Relation Name Age of Onset    Arthritis Mother Sunshine Fish     Skin cancer Mother Sunshine Fish     Asthma Brother Avni Fish     Alcohol abuse Maternal Grandfather Mr. Antoine     Alcohol abuse Paternal Grandmother Venus Fish     Skin cancer Maternal Grandmother      Macular degeneration Maternal Grandmother      Coronary artery disease Brother  49    Drug abuse Other nephew     Glaucoma Neg Hx      Strabismus Neg Hx      Stroke Neg Hx      Blindness Neg Hx          Social History[1]   1 beer/wk  Tobacco Use History[2]   Never smoker   Allergies as of 02/27/2025    (No Known Allergies)          Home Medications:  Prior to Admission medications    Medication Sig Start Date End Date Taking? Authorizing Provider   cetirizine (ZYRTEC) 10 MG tablet Take 10 mg by mouth once daily.   Yes Provider, Historical   dextroamphetamine-amphetamine  (ADDERALL XR) 20 MG 24 hr capsule Take 20 mg by mouth every morning. 1/6/22  Yes Provider, Historical   dextroamphetamine-amphetamine (ADDERALL XR) 30 MG 24 hr capsule Take 30 mg by mouth every morning.   Yes Provider, Historical   fluticasone propionate (FLONASE) 50 mcg/actuation nasal spray 1 spray by Each Nostril route once daily.   Yes Provider, Historical   papaverine 30 mg/mL injection Add Phentolamine 1 mg/cc  Add PGE1 10 mcg/cc    SIG:  Bring to office for test dose in physician office 9/5/24  Yes Zach Guillen MD   rosuvastatin (CRESTOR) 20 MG tablet Take 1 tablet (20 mg total) by mouth every evening. 10/15/24  Yes Arlene Finch MD   sertraline (ZOLOFT) 100 MG tablet Take 1 tablet (100 mg total) by mouth every evening. 5/17/24  Yes Ashleigh Donahue PA-C   tamsulosin (FLOMAX) 0.4 mg Cap Take 1 capsule (0.4 mg total) by mouth once daily. 9/5/24  Yes Zach Guillen MD   meclizine (ANTIVERT) 25 mg tablet Take 1 tablet (25 mg total) by mouth 3 (three) times daily as needed for Dizziness.  Patient not taking: Reported on 2/27/2025 8/23/24   Ashleigh Donahue PA-C   tadalafiL (CIALIS) 20 MG Tab Take 1 tablet (20 mg total) by mouth every 72 hours as needed (ED). Take at least 2 hours before activity  Patient not taking: Reported on 2/27/2025 4/19/24 4/19/25  Jagruti Castillo NP       Review of Systems:  Review of Systems   Constitutional:  Negative for chills, diaphoresis, fatigue and fever.   HENT:  Positive for postnasal drip. Negative for congestion, ear discharge, ear pain, rhinorrhea, sinus pressure, sinus pain and sore throat.    Eyes:  Negative for photophobia and visual disturbance.   Respiratory:  Positive for cough. Negative for chest tightness, shortness of breath, wheezing and stridor.    Cardiovascular:  Negative for chest pain and palpitations.   Gastrointestinal:  Negative for abdominal pain, blood in stool, constipation, diarrhea and vomiting.   Endocrine: Negative for polydipsia  "and polyuria.   Genitourinary:  Negative for difficulty urinating, hematuria and urgency.   Musculoskeletal:  Negative for arthralgias and myalgias.   Skin:  Negative for color change and pallor.   Neurological:  Negative for dizziness, light-headedness and headaches.   Hematological:  Negative for adenopathy.   Psychiatric/Behavioral:  The patient is not nervous/anxious.        Health Maintainence:   Immunizations:  Health Maintenance         Date Due Completion Date    RSV Vaccine (Age 60+ and Pregnant patients) (1 - Risk 60-74 years 1-dose series) Never done ---    COVID-19 Vaccine (8 - 2024-25 season) 11/05/2024 9/10/2024    Diabetic Eye Exam 01/17/2025 1/17/2024    Override on 1/17/2024: Done    Hemoglobin A1c 08/28/2025 2/28/2025    Low Dose Statin 02/27/2026 2/27/2025    Diabetes Urine Screening 02/27/2026 2/27/2025    Foot Exam 02/27/2026 2/27/2025    Lipid Panel 02/28/2026 2/28/2025    Colorectal Cancer Screening 05/12/2029 5/12/2022    Override on 7/12/2011: Done    TETANUS VACCINE 03/23/2032 3/23/2022             PHYSICAL EXAM     /80 (BP Location: Right arm, Patient Position: Sitting)   Pulse 65   Ht 6' 2" (1.88 m)   Wt 86.4 kg (190 lb 7.6 oz)   SpO2 99%   BMI 24.46 kg/m²  Body mass index is 24.46 kg/m².    Physical Exam  Vitals and nursing note reviewed.   Constitutional:       General: He is not in acute distress.     Appearance: Normal appearance. He is well-developed. He is not ill-appearing.      Comments: Pt is a well-appearing male in NAD   Ambulates with ease and speaks clearly in full sentences     HENT:      Head: Normocephalic and atraumatic.      Right Ear: Tympanic membrane, ear canal and external ear normal.      Left Ear: Tympanic membrane, ear canal and external ear normal.      Nose: Nose normal. No congestion or rhinorrhea.      Mouth/Throat:      Mouth: Mucous membranes are moist.      Pharynx: Oropharynx is clear. No oropharyngeal exudate or posterior oropharyngeal " erythema.   Eyes:      Extraocular Movements: Extraocular movements intact.      Conjunctiva/sclera: Conjunctivae normal.      Pupils: Pupils are equal, round, and reactive to light.   Cardiovascular:      Rate and Rhythm: Normal rate and regular rhythm.      Pulses: Normal pulses.           Dorsalis pedis pulses are 2+ on the right side and 2+ on the left side.      Heart sounds: Normal heart sounds. No murmur heard.     No friction rub. No gallop.      Comments: Varicose vv bilateral LE  Pulmonary:      Effort: Pulmonary effort is normal. No respiratory distress.      Breath sounds: Normal breath sounds. No stridor. No wheezing, rhonchi or rales.   Abdominal:      General: Abdomen is flat. There is no distension.      Palpations: There is no mass.      Tenderness: There is no abdominal tenderness. There is no guarding or rebound.   Musculoskeletal:      Cervical back: Normal range of motion.      Right foot: No deformity.      Left foot: No deformity.   Feet:      Right foot:      Protective Sensation: 6 sites tested.  6 sites sensed.      Skin integrity: No ulcer, blister or skin breakdown.      Left foot:      Protective Sensation: 6 sites tested.  6 sites sensed.      Skin integrity: No ulcer, blister or skin breakdown.   Lymphadenopathy:      Cervical: No cervical adenopathy.   Skin:     General: Skin is warm.      Coloration: Skin is not jaundiced or pale.   Neurological:      General: No focal deficit present.      Mental Status: He is alert and oriented to person, place, and time. Mental status is at baseline.   Psychiatric:         Mood and Affect: Mood normal.         Behavior: Behavior normal.         Thought Content: Thought content normal.         LABS     Lab Results   Component Value Date    HGBA1C 5.5 02/28/2025    HGBA1C 5.6 02/28/2025     CMP  Sodium   Date Value Ref Range Status   02/28/2025 140 136 - 145 mmol/L Final   02/28/2025 140 136 - 145 mmol/L Final     Potassium   Date Value Ref Range  Status   02/28/2025 4.1 3.5 - 5.1 mmol/L Final   02/28/2025 4.1 3.5 - 5.1 mmol/L Final     Chloride   Date Value Ref Range Status   02/28/2025 108 95 - 110 mmol/L Final   02/28/2025 108 95 - 110 mmol/L Final     CO2   Date Value Ref Range Status   02/28/2025 24 23 - 29 mmol/L Final   02/28/2025 24 23 - 29 mmol/L Final     Glucose   Date Value Ref Range Status   02/28/2025 169 (H) 70 - 110 mg/dL Final   02/28/2025 169 (H) 70 - 110 mg/dL Final     BUN   Date Value Ref Range Status   02/28/2025 27 (H) 8 - 23 mg/dL Final   02/28/2025 27 (H) 8 - 23 mg/dL Final     Creatinine   Date Value Ref Range Status   02/28/2025 1.4 0.5 - 1.4 mg/dL Final   02/28/2025 1.4 0.5 - 1.4 mg/dL Final     Calcium   Date Value Ref Range Status   02/28/2025 9.1 8.7 - 10.5 mg/dL Final   02/28/2025 9.1 8.7 - 10.5 mg/dL Final     Total Protein   Date Value Ref Range Status   02/28/2025 6.5 6.0 - 8.4 g/dL Final   02/28/2025 6.5 6.0 - 8.4 g/dL Final     Albumin   Date Value Ref Range Status   02/28/2025 4.0 3.5 - 5.2 g/dL Final   02/28/2025 4.0 3.5 - 5.2 g/dL Final     Total Bilirubin   Date Value Ref Range Status   02/28/2025 1.2 (H) 0.1 - 1.0 mg/dL Final     Comment:     For infants and newborns, interpretation of results should be based  on gestational age, weight and in agreement with clinical  observations.    Premature Infant recommended reference ranges:  Up to 24 hours.............<8.0 mg/dL  Up to 48 hours............<12.0 mg/dL  3-5 days..................<15.0 mg/dL  6-29 days.................<15.0 mg/dL     02/28/2025 1.2 (H) 0.1 - 1.0 mg/dL Final     Comment:     For infants and newborns, interpretation of results should be based  on gestational age, weight and in agreement with clinical  observations.    Premature Infant recommended reference ranges:  Up to 24 hours.............<8.0 mg/dL  Up to 48 hours............<12.0 mg/dL  3-5 days..................<15.0 mg/dL  6-29 days.................<15.0 mg/dL       Alkaline Phosphatase    Date Value Ref Range Status   02/28/2025 52 40 - 150 U/L Final   02/28/2025 52 40 - 150 U/L Final     AST   Date Value Ref Range Status   02/28/2025 22 10 - 40 U/L Final   02/28/2025 22 10 - 40 U/L Final     ALT   Date Value Ref Range Status   02/28/2025 24 10 - 44 U/L Final   02/28/2025 24 10 - 44 U/L Final     Anion Gap   Date Value Ref Range Status   02/28/2025 8 8 - 16 mmol/L Final   02/28/2025 8 8 - 16 mmol/L Final     eGFR if    Date Value Ref Range Status   01/07/2022 >60 >60 mL/min/1.73 m^2 Final     eGFR if non    Date Value Ref Range Status   01/07/2022 53 (A) >60 mL/min/1.73 m^2 Final     Comment:     Calculation used to obtain the estimated glomerular filtration  rate (eGFR) is the CKD-EPI equation.        Lab Results   Component Value Date    WBC 3.91 07/01/2024    HGB 13.5 (L) 07/01/2024    HCT 40.5 07/01/2024    MCV 86 07/01/2024     (L) 07/01/2024     Lab Results   Component Value Date    CHOL 106 (L) 02/28/2025    CHOL 104 (L) 04/01/2024    CHOL 160 03/30/2023     Lab Results   Component Value Date    HDL 44 02/28/2025    HDL 44 04/01/2024    HDL 39 (L) 03/30/2023     Lab Results   Component Value Date    LDLCALC 51.4 (L) 02/28/2025    LDLCALC 44.8 (L) 04/01/2024    LDLCALC 97.2 03/30/2023     Lab Results   Component Value Date    TRIG 53 02/28/2025    TRIG 76 04/01/2024    TRIG 119 03/30/2023     Lab Results   Component Value Date    CHOLHDL 41.5 02/28/2025    CHOLHDL 42.3 04/01/2024    CHOLHDL 24.4 03/30/2023     Lab Results   Component Value Date    TSH 1.459 10/12/2020       ASSESSMENT/PLAN     Lewis Fish is a 69 y.o. male seen today for annual examination and follow up on CKD.    Diagnoses and all orders for this visit:  Annual physical exam  -     Hemoglobin A1c; Future  -     Lipid Panel; Future  -     Hemoglobin A1C; Future  - Pt has continued dietary changes to maintain A1c at goal.   - Pt due for diabetic foot exam which was completed today.    - Pt has apt scheduled with optometry for formal evaluation.  - Pt encouraged to continue diet and exercise    Encounter for diabetic foot exam  - Completed today    Controlled type 2 diabetes mellitus without complication, without long-term current use of insulin  -     Ambulatory referral/consult to Optometry; Future  -     Hemoglobin A1c; Future  -     Lipid Panel; Future  -     Comprehensive Metabolic Panel; Future  -     Microalbumin/Creatinine Ratio, Urine; Future  -     Hemoglobin A1C; Future    Measles screening  -     Rubeola Antibody IgG; Future  -  Pt unsure about immunization status  -  Will draw titers for reassurance    Seasonal allergic rhinitis, unspecified trigger  Comments:  add astelin, cont flonase and zyrtec  Orders:  -     azelastine (ASTELIN) 137 mcg (0.1 %) nasal spray; 1 spray (137 mcg total) by Nasal route 2 (two) times daily.    Gastroesophageal reflux disease, unspecified whether esophagitis present  Comments:  occasional symptoms, postnasal drip may be related, two week trial of prilosec otc    Stage 3a chronic kidney disease  -     Microalbumin/Creatinine Ratio, Urine; Future  -     Comprehensive Metabolic Panel; Future  - avoid nsaids, increase po water intake  -  avoid all nephrotoxins, cont to keep BP low (no HTN) and diabetes controlled      Attention deficit hyperactivity disorder (ADHD), combined type  - Followed by Dr Dwight Rincon [id number] [facility] [phone]  - Controlled with adderall XR 50 mg daily    Thrombocytosis, mild, no bruising or bleeding noted  Cont to monitor, repeat in 3 mo    Brittany RIVER  [facility]          [1]   Social History  Socioeconomic History    Marital status:      Spouse name: Cecilia    Number of children: 3   Occupational History    Occupation:    Tobacco Use    Smoking status: Never    Smokeless tobacco: Never   Substance and Sexual Activity    Alcohol use: Yes     Alcohol/week: 1.0  standard drink of alcohol     Types: 1 Cans of beer per week     Comment: less than one a week    Drug use: Never    Sexual activity: Yes     Partners: Female     Birth control/protection: Post-menopausal, See Surgical Hx   Other Topics Concern    Patient feels they ought to cut down on drinking/drug use No    Patient annoyed by others criticizing their drinking/drug use No    Patient has felt bad or guilty about drinking/drug use No    Patient has had a drink/used drugs as an eye opener in the AM No   Social History Narrative    Walking and cross training 5-6 times weekly. Lives w wife, 3 kids grown.    Works @ [employer] as .          Lives with wife Cecilia, his 30 yo son Anshul, and two cats.     Social Drivers of Health     Financial Resource Strain: Low Risk  (6/25/2024)    Received from [facility]    Financial Resource Strain     Financial Concerns: none of these   Food Insecurity: Low Risk  (6/25/2024)    Received from [facility]    Food Insecurity     Within the past 12 months, you worried that your food would run out before you got money to buy more.: Never True     Within the past 12 months, the food you bought just didnt last and you didnt have money to get more.: Never True   Transportation Needs: Low Risk  (6/25/2024)    Received from [facility]    Transportation Needs     In the past 12 months, has lack of transportation kept you from medical appointments, meetings, work or from getting things needed for daily living?: No   Physical Activity: Sufficiently Active (5/16/2024)    Exercise Vital Sign     Days of Exercise per Week: 5 days     Minutes of Exercise per Session: 100 min   Stress: No Stress Concern Present (5/16/2024)    [employer] - Occupational Stress Questionnaire     Feeling of Stress : Not at all   Housing Stability: Low Risk  (6/25/2024)    Received from [facility]     Housing Stability     What is your living situation today?: I have a steady place to live   [2]   Social History  Tobacco Use   Smoking Status Never   Smokeless Tobacco Never

## 2025-02-27 NOTE — PROGRESS NOTES
Internal Medicine Annual Exam       CHIEF COMPLAINT     The patient, Lewis Fish, who is a 69 y.o. male presents for an annual exam.    HPI     PCP is Arlene Finch MD, patient is known to me.       Pt reports postnasal drip x1-2 months. He takes flonase and zyrtec. He has a history of sinusitis. Pt no congestion, headaches, sinus pressure or pain.   Denies chest pain, shortness of breath, headaches, dizziness,     No hematuria, dysuria, urinary incontinence.Stopped tumeric. Gfr 47. Drinks plenty of water.    KFRE 5-Year: 0.3% at 2/28/2025  7:23 AM  Calculated from:  Serum Creatinine: 1.4 mg/dL at 2/28/2025  7:23 AM  Urine Albumin Creatinine Ratio: 5.9 ug/mg at 4/1/2024  7:01 AM  Age: 69 years  Sex: Male at 2/28/2025  7:23 AM  Has CKD-3 to CKD-5: Yes    Health Maintenance:  Flu and covid completed in oct 24  Optomety apt scheduled    Past Medical History:  Past Medical History:   Diagnosis Date    ADHD (attention deficit hyperactivity disorder)     Allergy seasonal    Anxiety     Controlled diabetes mellitus type 1 without complications 4/4/2023    Depression     Family history of early CAD     brother with CAD age 49    Gilbert syndrome     History of psychiatric care     Prostate cancer prostate    bradytherapy    Psychiatric problem     Therapy        Past Surgical History:   Procedure Laterality Date    ANKLE LIGAMENT RECONSTRUCTION  2014    right    ARTHROSCOPY,SHOULDER,WITH BICEPS TENODESIS Left 2/17/2023    Procedure: ARTHROSCOPY,SHOULDER,WITH BICEPS TENODESIS;  Surgeon: Claude S. Williams IV, MD;  Location: [facility];  Service: Orthopedics;  Laterality: Left;    COLONOSCOPY N/A 5/12/2022    Procedure: COLONOSCOPY;  Surgeon: Sid Ko MD;  Location: [address]);  Service: Endoscopy;  Laterality: N/A;  fully vaccinated -   instructions emailed-     HERNIA REPAIR  1996    inguinal bilateral    PROSTATE SURGERY  2008    brachytherapy    ROTATOR CUFF REPAIR Left 2/17/2023     Procedure: REPAIR, ROTATOR CUFF;  Surgeon: Claude S. Williams IV, MD;  Location: [facility] OR;  Service: Orthopedics;  Laterality: Left;    SHOULDER ARTHROSCOPY Left 2/17/2023    Procedure: ARTHROSCOPY, SHOULDER;  Surgeon: Claude S. Williams IV, MD;  Location: [facility] OR;  Service: Orthopedics;  Laterality: Left;    testicle removed      complication of hernia repair    TUMOR REMOVAL  1996    nail bed        Family History   Problem Relation Name Age of Onset    Arthritis Mother Sunshine Fish     Skin cancer Mother Sunshine Fish     Asthma Brother Avni Fish     Alcohol abuse Maternal Grandfather Mr. Antoine     Alcohol abuse Paternal Grandmother Venus Fish     Skin cancer Maternal Grandmother      Macular degeneration Maternal Grandmother      Coronary artery disease Brother  49    Drug abuse Other nephew     Glaucoma Neg Hx      Strabismus Neg Hx      Stroke Neg Hx      Blindness Neg Hx          Social History[1]     Tobacco Use History[2]     Allergies as of 02/27/2025    (No Known Allergies)          Home Medications:  Prior to Admission medications    Medication Sig Start Date End Date Taking? Authorizing Provider   cetirizine (ZYRTEC) 10 MG tablet Take 10 mg by mouth once daily.   Yes Provider, Historical   dextroamphetamine-amphetamine (ADDERALL XR) 20 MG 24 hr capsule Take 20 mg by mouth every morning. 1/6/22  Yes Provider, Historical   dextroamphetamine-amphetamine (ADDERALL XR) 30 MG 24 hr capsule Take 30 mg by mouth every morning.   Yes Provider, Historical   fluticasone propionate (FLONASE) 50 mcg/actuation nasal spray 1 spray by Each Nostril route once daily.   Yes Provider, Historical   papaverine 30 mg/mL injection Add Phentolamine 1 mg/cc  Add PGE1 10 mcg/cc    SIG:  Bring to office for test dose in physician office 9/5/24  Yes Zach Guillen MD   rosuvastatin (CRESTOR) 20 MG tablet Take 1 tablet (20 mg total) by mouth every evening. 10/15/24  Yes Arlene Finch MD   sertraline (ZOLOFT) 100 MG  "tablet Take 1 tablet (100 mg total) by mouth every evening. 5/17/24  Yes Ashleigh Donahue PA-C   tamsulosin (FLOMAX) 0.4 mg Cap Take 1 capsule (0.4 mg total) by mouth once daily. 9/5/24  Yes Zach Guillen MD   meclizine (ANTIVERT) 25 mg tablet Take 1 tablet (25 mg total) by mouth 3 (three) times daily as needed for Dizziness.  Patient not taking: Reported on 2/27/2025 8/23/24   Ashleigh Donahue PA-C   tadalafiL (CIALIS) 20 MG Tab Take 1 tablet (20 mg total) by mouth every 72 hours as needed (ED). Take at least 2 hours before activity  Patient not taking: Reported on 2/27/2025 4/19/24 4/19/25  Jagruti Castillo NP       Review of Systems:  Review of Systems    Health Maintainence:   Immunizations:  Health Maintenance         Date Due Completion Date    RSV Vaccine (Age 60+ and Pregnant patients) (1 - Risk 60-74 years 1-dose series) Never done ---    COVID-19 Vaccine (8 - 2024-25 season) 11/05/2024 9/10/2024    Diabetic Eye Exam 01/17/2025 1/17/2024    Override on 1/17/2024: Done    Hemoglobin A1c 08/28/2025 2/28/2025    Low Dose Statin 02/27/2026 2/27/2025    Diabetes Urine Screening 02/27/2026 2/27/2025    Foot Exam 02/27/2026 2/27/2025    Lipid Panel 02/28/2026 2/28/2025    Colorectal Cancer Screening 05/12/2029 5/12/2022    Override on 7/12/2011: Done    TETANUS VACCINE 03/23/2032 3/23/2022             PHYSICAL EXAM     /80 (BP Location: Right arm, Patient Position: Sitting)   Pulse 65   Ht 6' 2" (1.88 m)   Wt 86.4 kg (190 lb 7.6 oz)   SpO2 99%   BMI 24.46 kg/m²  Body mass index is 24.46 kg/m².    Physical Exam  Constitutional:       Appearance: Normal appearance. He is well-developed. He is not ill-appearing.   HENT:      Head: Normocephalic and atraumatic.   Eyes:      General: No scleral icterus.     Conjunctiva/sclera: Conjunctivae normal.   Cardiovascular:      Rate and Rhythm: Normal rate.      Pulses:           Dorsalis pedis pulses are 2+ on the right side and 2+ on the left side.      " Heart sounds: Normal heart sounds. No murmur heard.     No friction rub. No gallop.      Comments: Varicose vv bilateral LE  Pulmonary:      Effort: Pulmonary effort is normal.      Breath sounds: Normal breath sounds. No wheezing or rales.   Chest:      Chest wall: No tenderness.   Musculoskeletal:         General: No tenderness or signs of injury.      Right foot: No deformity.      Left foot: No deformity.   Feet:      Right foot:      Protective Sensation: 6 sites tested.  6 sites sensed.      Skin integrity: No ulcer, blister or skin breakdown.      Left foot:      Protective Sensation: 6 sites tested.  6 sites sensed.      Skin integrity: No ulcer, blister or skin breakdown.   Skin:     General: Skin is warm and dry.   Neurological:      Mental Status: He is alert and oriented to person, place, and time. Mental status is at baseline.   Psychiatric:         Behavior: Behavior normal.         Thought Content: Thought content normal.         LABS     Lab Results   Component Value Date    HGBA1C 5.5 02/28/2025    HGBA1C 5.6 02/28/2025     CMP  Sodium   Date Value Ref Range Status   02/28/2025 140 136 - 145 mmol/L Final   02/28/2025 140 136 - 145 mmol/L Final     Potassium   Date Value Ref Range Status   02/28/2025 4.1 3.5 - 5.1 mmol/L Final   02/28/2025 4.1 3.5 - 5.1 mmol/L Final     Chloride   Date Value Ref Range Status   02/28/2025 108 95 - 110 mmol/L Final   02/28/2025 108 95 - 110 mmol/L Final     CO2   Date Value Ref Range Status   02/28/2025 24 23 - 29 mmol/L Final   02/28/2025 24 23 - 29 mmol/L Final     Glucose   Date Value Ref Range Status   02/28/2025 169 (H) 70 - 110 mg/dL Final   02/28/2025 169 (H) 70 - 110 mg/dL Final     BUN   Date Value Ref Range Status   02/28/2025 27 (H) 8 - 23 mg/dL Final   02/28/2025 27 (H) 8 - 23 mg/dL Final     Creatinine   Date Value Ref Range Status   02/28/2025 1.4 0.5 - 1.4 mg/dL Final   02/28/2025 1.4 0.5 - 1.4 mg/dL Final     Calcium   Date Value Ref Range Status    02/28/2025 9.1 8.7 - 10.5 mg/dL Final   02/28/2025 9.1 8.7 - 10.5 mg/dL Final     Total Protein   Date Value Ref Range Status   02/28/2025 6.5 6.0 - 8.4 g/dL Final   02/28/2025 6.5 6.0 - 8.4 g/dL Final     Albumin   Date Value Ref Range Status   02/28/2025 4.0 3.5 - 5.2 g/dL Final   02/28/2025 4.0 3.5 - 5.2 g/dL Final     Total Bilirubin   Date Value Ref Range Status   02/28/2025 1.2 (H) 0.1 - 1.0 mg/dL Final     Comment:     For infants and newborns, interpretation of results should be based  on gestational age, weight and in agreement with clinical  observations.    Premature Infant recommended reference ranges:  Up to 24 hours.............<8.0 mg/dL  Up to 48 hours............<12.0 mg/dL  3-5 days..................<15.0 mg/dL  6-29 days.................<15.0 mg/dL     02/28/2025 1.2 (H) 0.1 - 1.0 mg/dL Final     Comment:     For infants and newborns, interpretation of results should be based  on gestational age, weight and in agreement with clinical  observations.    Premature Infant recommended reference ranges:  Up to 24 hours.............<8.0 mg/dL  Up to 48 hours............<12.0 mg/dL  3-5 days..................<15.0 mg/dL  6-29 days.................<15.0 mg/dL       Alkaline Phosphatase   Date Value Ref Range Status   02/28/2025 52 40 - 150 U/L Final   02/28/2025 52 40 - 150 U/L Final     AST   Date Value Ref Range Status   02/28/2025 22 10 - 40 U/L Final   02/28/2025 22 10 - 40 U/L Final     ALT   Date Value Ref Range Status   02/28/2025 24 10 - 44 U/L Final   02/28/2025 24 10 - 44 U/L Final     Anion Gap   Date Value Ref Range Status   02/28/2025 8 8 - 16 mmol/L Final   02/28/2025 8 8 - 16 mmol/L Final     eGFR if    Date Value Ref Range Status   01/07/2022 >60 >60 mL/min/1.73 m^2 Final     eGFR if non    Date Value Ref Range Status   01/07/2022 53 (A) >60 mL/min/1.73 m^2 Final     Comment:     Calculation used to obtain the estimated glomerular filtration  rate (eGFR) is  the CKD-EPI equation.        Lab Results   Component Value Date    WBC 3.91 07/01/2024    HGB 13.5 (L) 07/01/2024    HCT 40.5 07/01/2024    MCV 86 07/01/2024     (L) 07/01/2024     Lab Results   Component Value Date    CHOL 106 (L) 02/28/2025    CHOL 104 (L) 04/01/2024    CHOL 160 03/30/2023     Lab Results   Component Value Date    HDL 44 02/28/2025    HDL 44 04/01/2024    HDL 39 (L) 03/30/2023     Lab Results   Component Value Date    LDLCALC 51.4 (L) 02/28/2025    LDLCALC 44.8 (L) 04/01/2024    LDLCALC 97.2 03/30/2023     Lab Results   Component Value Date    TRIG 53 02/28/2025    TRIG 76 04/01/2024    TRIG 119 03/30/2023     Lab Results   Component Value Date    CHOLHDL 41.5 02/28/2025    CHOLHDL 42.3 04/01/2024    CHOLHDL 24.4 03/30/2023     Lab Results   Component Value Date    TSH 1.459 10/12/2020       ASSESSMENT/PLAN     Lewis Fish is a 69 y.o. male         1. Annual physical exam  -     Hemoglobin A1c; Future; Expected date: 02/27/2025  -     Lipid Panel; Future; Expected date: 02/27/2025  -     Hemoglobin A1C; Future; Expected date: 08/26/2025    2. Encounter for diabetic foot exam    3. Controlled type 2 diabetes mellitus without complication, without long-term current use of insulin  -     Ambulatory referral/consult to Optometry; Future; Expected date: 03/06/2025  -     Hemoglobin A1c; Future; Expected date: 02/27/2025  -     Lipid Panel; Future; Expected date: 02/27/2025  -     Comprehensive Metabolic Panel; Future; Expected date: 02/27/2025  -     Microalbumin/Creatinine Ratio, Urine; Future  -     Hemoglobin A1C; Future; Expected date: 08/26/2025    4. Measles screening  -     Rubeola Antibody IgG; Future; Expected date: 02/27/2025    5. Seasonal allergic rhinitis, unspecified trigger  Comments:  add astelin, cont flonase and zyrtec  Orders:  -     azelastine (ASTELIN) 137 mcg (0.1 %) nasal spray; 1 spray (137 mcg total) by Nasal route 2 (two) times daily.  Dispense: 30 mL; Refill:  3    6. Gastroesophageal reflux disease, unspecified whether esophagitis present  Comments:  occasional symptoms, postnasal drip may be related, two week trial of prilosec otc    7. Stage 3a chronic kidney disease  Overview:  avoid nsaids, increase po water intake  avoid all nephrotoxins, cont to keep BP low (no HTN) and diabetes controlled  Avoid tumeric    Orders:  -     Microalbumin/Creatinine Ratio, Urine; Future  -     Comprehensive Metabolic Panel; Future; Expected date: 08/30/2025    8. Attention deficit hyperactivity disorder (ADHD), combined type  Overview:  Followed by Dr Dwight Rincon [id number] [facility] [phone]  Controlled with adderall XR 50 mg daily           Brittany RIVER  [facility]     I have personally taken the history and examined this patient and agree with the students note as stated above note.             [1]   Social History  Socioeconomic History    Marital status:      Spouse name: Cecilia    Number of children: 3   Occupational History    Occupation:    Tobacco Use    Smoking status: Never    Smokeless tobacco: Never   Substance and Sexual Activity    Alcohol use: Yes     Alcohol/week: 1.0 standard drink of alcohol     Types: 1 Cans of beer per week     Comment: less than one a week    Drug use: Never    Sexual activity: Yes     Partners: Female     Birth control/protection: Post-menopausal, See Surgical Hx   Other Topics Concern    Patient feels they ought to cut down on drinking/drug use No    Patient annoyed by others criticizing their drinking/drug use No    Patient has felt bad or guilty about drinking/drug use No    Patient has had a drink/used drugs as an eye opener in the AM No   Social History Narrative    Walking and cross training 5-6 times weekly. Lives w wife, 3 kids grown.    Works @ [employer] as .          Lives with wife Cecilia, his 28 yo son Anshul, and two cats.     Social Drivers of Health     Financial  Resource Strain: Low Risk  (6/25/2024)    Received from [facility]    Financial Resource Strain     Financial Concerns: none of these   Food Insecurity: Low Risk  (6/25/2024)    Received from [facility]    Food Insecurity     Within the past 12 months, you worried that your food would run out before you got money to buy more.: Never True     Within the past 12 months, the food you bought just didnt last and you didnt have money to get more.: Never True   Transportation Needs: Low Risk  (6/25/2024)    Received from [facility]    Transportation Needs     In the past 12 months, has lack of transportation kept you from medical appointments, meetings, work or from getting things needed for daily living?: No   Physical Activity: Sufficiently Active (5/16/2024)    Exercise Vital Sign     Days of Exercise per Week: 5 days     Minutes of Exercise per Session: 100 min   Stress: No Stress Concern Present (5/16/2024)    [employer] - Occupational Stress Questionnaire     Feeling of Stress : Not at all   Housing Stability: Low Risk  (6/25/2024)    Received from [facility]    Housing Stability     What is your living situation today?: I have a steady place to live   [2]   Social History  Tobacco Use   Smoking Status Never   Smokeless Tobacco Never

## 2025-02-28 ENCOUNTER — LAB VISIT (OUTPATIENT)
Dept: LAB | Facility: OTHER | Age: 70
End: 2025-02-28
Attending: INTERNAL MEDICINE
Payer: COMMERCIAL

## 2025-02-28 DIAGNOSIS — Z11.59 MEASLES SCREENING: ICD-10-CM

## 2025-02-28 DIAGNOSIS — E11.9 CONTROLLED TYPE 2 DIABETES MELLITUS WITHOUT COMPLICATION, WITHOUT LONG-TERM CURRENT USE OF INSULIN: ICD-10-CM

## 2025-02-28 DIAGNOSIS — Z00.00 ANNUAL PHYSICAL EXAM: ICD-10-CM

## 2025-02-28 DIAGNOSIS — N18.31 STAGE 3A CHRONIC KIDNEY DISEASE: ICD-10-CM

## 2025-02-28 LAB
ALBUMIN SERPL BCP-MCNC: 4 G/DL (ref 3.5–5.2)
ALBUMIN SERPL BCP-MCNC: 4 G/DL (ref 3.5–5.2)
ALP SERPL-CCNC: 52 U/L (ref 40–150)
ALP SERPL-CCNC: 52 U/L (ref 40–150)
ALT SERPL W/O P-5'-P-CCNC: 24 U/L (ref 10–44)
ALT SERPL W/O P-5'-P-CCNC: 24 U/L (ref 10–44)
ANION GAP SERPL CALC-SCNC: 8 MMOL/L (ref 8–16)
ANION GAP SERPL CALC-SCNC: 8 MMOL/L (ref 8–16)
AST SERPL-CCNC: 22 U/L (ref 10–40)
AST SERPL-CCNC: 22 U/L (ref 10–40)
BILIRUB SERPL-MCNC: 1.2 MG/DL (ref 0.1–1)
BILIRUB SERPL-MCNC: 1.2 MG/DL (ref 0.1–1)
BUN SERPL-MCNC: 27 MG/DL (ref 8–23)
BUN SERPL-MCNC: 27 MG/DL (ref 8–23)
CALCIUM SERPL-MCNC: 9.1 MG/DL (ref 8.7–10.5)
CALCIUM SERPL-MCNC: 9.1 MG/DL (ref 8.7–10.5)
CHLORIDE SERPL-SCNC: 108 MMOL/L (ref 95–110)
CHLORIDE SERPL-SCNC: 108 MMOL/L (ref 95–110)
CHOLEST SERPL-MCNC: 106 MG/DL (ref 120–199)
CHOLEST/HDLC SERPL: 2.4 {RATIO} (ref 2–5)
CO2 SERPL-SCNC: 24 MMOL/L (ref 23–29)
CO2 SERPL-SCNC: 24 MMOL/L (ref 23–29)
CREAT SERPL-MCNC: 1.4 MG/DL (ref 0.5–1.4)
CREAT SERPL-MCNC: 1.4 MG/DL (ref 0.5–1.4)
EST. GFR  (NO RACE VARIABLE): 55 ML/MIN/1.73 M^2
EST. GFR  (NO RACE VARIABLE): 55 ML/MIN/1.73 M^2
ESTIMATED AVG GLUCOSE: 111 MG/DL (ref 68–131)
ESTIMATED AVG GLUCOSE: 114 MG/DL (ref 68–131)
GLUCOSE SERPL-MCNC: 169 MG/DL (ref 70–110)
GLUCOSE SERPL-MCNC: 169 MG/DL (ref 70–110)
HBA1C MFR BLD: 5.5 % (ref 4–5.6)
HBA1C MFR BLD: 5.6 % (ref 4–5.6)
HDLC SERPL-MCNC: 44 MG/DL (ref 40–75)
HDLC SERPL: 41.5 % (ref 20–50)
LDLC SERPL CALC-MCNC: 51.4 MG/DL (ref 63–159)
NONHDLC SERPL-MCNC: 62 MG/DL
POTASSIUM SERPL-SCNC: 4.1 MMOL/L (ref 3.5–5.1)
POTASSIUM SERPL-SCNC: 4.1 MMOL/L (ref 3.5–5.1)
PROT SERPL-MCNC: 6.5 G/DL (ref 6–8.4)
PROT SERPL-MCNC: 6.5 G/DL (ref 6–8.4)
SODIUM SERPL-SCNC: 140 MMOL/L (ref 136–145)
SODIUM SERPL-SCNC: 140 MMOL/L (ref 136–145)
TRIGL SERPL-MCNC: 53 MG/DL (ref 30–150)

## 2025-02-28 PROCEDURE — 83036 HEMOGLOBIN GLYCOSYLATED A1C: CPT | Mod: 91 | Performed by: INTERNAL MEDICINE

## 2025-02-28 PROCEDURE — 86765 RUBEOLA ANTIBODY: CPT | Performed by: INTERNAL MEDICINE

## 2025-02-28 PROCEDURE — 36415 COLL VENOUS BLD VENIPUNCTURE: CPT | Performed by: INTERNAL MEDICINE

## 2025-02-28 PROCEDURE — 80053 COMPREHEN METABOLIC PANEL: CPT | Performed by: INTERNAL MEDICINE

## 2025-02-28 PROCEDURE — 80061 LIPID PANEL: CPT | Performed by: INTERNAL MEDICINE

## 2025-03-03 LAB
RUBEOLA IGG ANTIBODY: >300 AU/ML
RUBEOLA INTERPRETATION: POSITIVE

## 2025-03-09 PROBLEM — D75.839 THROMBOCYTOSIS: Status: ACTIVE | Noted: 2025-03-09

## 2025-03-10 ENCOUNTER — TELEPHONE (OUTPATIENT)
Dept: INTERNAL MEDICINE | Facility: CLINIC | Age: 70
End: 2025-03-10
Payer: COMMERCIAL

## 2025-03-10 NOTE — TELEPHONE ENCOUNTER
----- Message from Arlene Finch MD sent at 3/9/2025  1:46 PM CDT -----  Please let pt know recent labs showed improvement in kidney function.  I would like to repeat this platelet count in 3 months, please schedule labs in 3 mo, thanks!

## 2025-04-03 ENCOUNTER — OFFICE VISIT (OUTPATIENT)
Dept: OPTOMETRY | Facility: CLINIC | Age: 70
End: 2025-04-03
Payer: COMMERCIAL

## 2025-04-03 DIAGNOSIS — H35.433 COBBLESTONE RETINAL DEGENERATION, BILATERAL: ICD-10-CM

## 2025-04-03 DIAGNOSIS — Z01.00 ENCOUNTER FOR DIABETES TYPE 2 EYE EXAM: ICD-10-CM

## 2025-04-03 DIAGNOSIS — E11.9 CONTROLLED TYPE 2 DIABETES MELLITUS WITHOUT COMPLICATION, WITHOUT LONG-TERM CURRENT USE OF INSULIN: ICD-10-CM

## 2025-04-03 DIAGNOSIS — H25.13 NS (NUCLEAR SCLEROSIS), BILATERAL: Primary | ICD-10-CM

## 2025-04-03 DIAGNOSIS — H43.811 PVD (POSTERIOR VITREOUS DETACHMENT), RIGHT EYE: ICD-10-CM

## 2025-04-03 DIAGNOSIS — H52.13 MYOPIA, BILATERAL: ICD-10-CM

## 2025-04-03 DIAGNOSIS — E11.9 TYPE 2 DIABETES MELLITUS WITHOUT OPHTHALMIC MANIFESTATIONS: ICD-10-CM

## 2025-04-03 DIAGNOSIS — E11.9 ENCOUNTER FOR DIABETES TYPE 2 EYE EXAM: ICD-10-CM

## 2025-04-03 PROCEDURE — 99999 PR PBB SHADOW E&M-EST. PATIENT-LVL III: CPT | Mod: PBBFAC,,, | Performed by: OPTOMETRIST

## 2025-04-03 NOTE — PROGRESS NOTES
HPI    Dls: 3/6/24 Dr. Whipple    68 y/o male presents today for diabetic eye exam.  Pt c/o blurry vision at distance ou. Pt wears pal's.     LBS ?     No tearing  No itching  No burning  No pain  No ha's  + ou off/on floaters  No flashes    Eye meds  None    Pohx:  None    Fohx:   MD- Grandmother     Hemoglobin A1C       Date                     Value               Ref Range             Status                02/28/2025               5.5                 4.0 - 5.6 %           Final              Comment:    ADA Screening Guidelines:  5.7-6.4%  Consistent with   prediabetes  >or=6.5%  Consistent with diabetes    High levels of fetal   hemoglobin interfere with the HbA1C  assay. Heterozygous hemoglobin   variants (HbS, HgC, etc)do  not significantly interfere with this assay.     However, presence of multiple variants may affect accuracy.         02/28/2025               5.6                 4.0 - 5.6 %           Final              Comment:    ADA Screening Guidelines:  5.7-6.4%  Consistent with   prediabetes  >or=6.5%  Consistent with diabetes    High levels of fetal   hemoglobin interfere with the HbA1C  assay. Heterozygous hemoglobin   variants (HbS, HgC, etc)do  not significantly interfere with this assay.     However, presence of multiple variants may affect accuracy.         08/20/2024               5.4                 4.0 - 5.6 %           Final              Comment:    ADA Screening Guidelines:  5.7-6.4%  Consistent with   prediabetes  >or=6.5%  Consistent with diabetes    High levels of fetal   hemoglobin interfere with the HbA1C  assay. Heterozygous hemoglobin   variants (HbS, HgC, etc)do  not significantly interfere with this assay.     However, presence of multiple variants may affect accuracy.    ----------    Last edited by Fiona Juan MA on 4/3/2025 12:42 PM.            Assessment /Plan     For exam results, see Encounter Report.          NS (nuclear sclerosis), bilateral    OD>OS              Not  visually significant,  Monitor yearly     Encounter for diabetes type 2 eye exam  Type 2 diabetes mellitus without ophthalmic manifestations  Controlled type 2 diabetes mellitus without complication, without long-term current use of insulin  No retinopathy, monitor yearly     PVD (posterior vitreous detachment), right eye    Stable, monitor    [place] retinal degeneration OU   Inferior OU   Stable since last visit   Pt will call back if would like to consult retina    Myopia, bilateral  Presbyopia               Rx specs, change OD likely 2/2 central NS OD        RTC 1 year

## 2025-04-18 NOTE — TELEPHONE ENCOUNTER
No care due was identified.  [facility] Embedded Care Due Messages. Reference number: [id number].   4/18/2025 10:02:08 AM CDT

## 2025-04-19 RX ORDER — ROSUVASTATIN CALCIUM 20 MG/1
20 TABLET, COATED ORAL NIGHTLY
Qty: 90 TABLET | Refills: 3 | Status: SHIPPED | OUTPATIENT
Start: 2025-04-19

## 2025-04-19 NOTE — TELEPHONE ENCOUNTER
Refill Decision Note   Lewis Fish  is requesting a refill authorization.  Brief Assessment and Rationale for Refill:  Approve     Medication Therapy Plan:        Comments:     Note composed:11:13 AM 04/19/2025

## 2025-05-01 DIAGNOSIS — F41.1 GAD (GENERALIZED ANXIETY DISORDER): ICD-10-CM

## 2025-05-01 RX ORDER — SERTRALINE HYDROCHLORIDE 100 MG/1
100 TABLET, FILM COATED ORAL NIGHTLY
Qty: 90 TABLET | Refills: 3 | Status: SHIPPED | OUTPATIENT
Start: 2025-05-01

## 2025-05-01 NOTE — TELEPHONE ENCOUNTER
No care due was identified.  [facility] Embedded Care Due Messages. Reference number: [id number].   5/01/2025 8:04:15 AM CDT

## 2025-05-01 NOTE — TELEPHONE ENCOUNTER
Refill Decision Note   Lewis Fish  is requesting a refill authorization.  Brief Assessment and Rationale for Refill:  Approve     Medication Therapy Plan:         Comments:     Note composed:8:32 AM 05/01/2025

## 2025-06-10 ENCOUNTER — PATIENT MESSAGE (OUTPATIENT)
Dept: INTERNAL MEDICINE | Facility: CLINIC | Age: 70
End: 2025-06-10
Payer: COMMERCIAL

## 2025-06-10 ENCOUNTER — LAB VISIT (OUTPATIENT)
Dept: LAB | Facility: OTHER | Age: 70
End: 2025-06-10
Attending: INTERNAL MEDICINE
Payer: COMMERCIAL

## 2025-06-10 DIAGNOSIS — D75.839 THROMBOCYTOSIS: ICD-10-CM

## 2025-06-10 DIAGNOSIS — D61.818 PANCYTOPENIA: Primary | ICD-10-CM

## 2025-06-10 LAB
ABSOLUTE EOSINOPHIL (OHS): 0.09 K/UL
ABSOLUTE MONOCYTE (OHS): 0.25 K/UL (ref 0.3–1)
ABSOLUTE NEUTROPHIL COUNT (OHS): 2.21 K/UL (ref 1.8–7.7)
BASOPHILS # BLD AUTO: 0.02 K/UL
BASOPHILS NFR BLD AUTO: 0.6 %
ERYTHROCYTE [DISTWIDTH] IN BLOOD BY AUTOMATED COUNT: 13.5 % (ref 11.5–14.5)
HCT VFR BLD AUTO: 38.2 % (ref 40–54)
HGB BLD-MCNC: 12.6 GM/DL (ref 14–18)
IMM GRANULOCYTES # BLD AUTO: 0.02 K/UL (ref 0–0.04)
IMM GRANULOCYTES NFR BLD AUTO: 0.6 % (ref 0–0.5)
LYMPHOCYTES # BLD AUTO: 1.03 K/UL (ref 1–4.8)
MCH RBC QN AUTO: 27.5 PG (ref 27–31)
MCHC RBC AUTO-ENTMCNC: 33 G/DL (ref 32–36)
MCV RBC AUTO: 83 FL (ref 82–98)
NUCLEATED RBC (/100WBC) (OHS): 0 /100 WBC
PLATELET # BLD AUTO: 109 K/UL (ref 150–450)
PMV BLD AUTO: 9.1 FL (ref 9.2–12.9)
RBC # BLD AUTO: 4.58 M/UL (ref 4.6–6.2)
RELATIVE EOSINOPHIL (OHS): 2.5 %
RELATIVE LYMPHOCYTE (OHS): 28.5 % (ref 18–48)
RELATIVE MONOCYTE (OHS): 6.9 % (ref 4–15)
RELATIVE NEUTROPHIL (OHS): 60.9 % (ref 38–73)
WBC # BLD AUTO: 3.62 K/UL (ref 3.9–12.7)

## 2025-06-10 PROCEDURE — 36415 COLL VENOUS BLD VENIPUNCTURE: CPT

## 2025-06-10 PROCEDURE — 85025 COMPLETE CBC W/AUTO DIFF WBC: CPT

## 2025-06-10 NOTE — TELEPHONE ENCOUNTER
Please let pt know his blood counts remain a bit abnormal. Please schedule follow up labs ordered today and arrange a virtual visit with me next week to discuss a plan.

## 2025-06-11 NOTE — TELEPHONE ENCOUNTER
I spoke with the patient and we were able to schedule his labs for Friday and his virtual visit the following Wednesday.

## 2025-06-13 ENCOUNTER — LAB VISIT (OUTPATIENT)
Dept: LAB | Facility: HOSPITAL | Age: 70
End: 2025-06-13
Attending: INTERNAL MEDICINE
Payer: COMMERCIAL

## 2025-06-13 DIAGNOSIS — D61.818 PANCYTOPENIA: ICD-10-CM

## 2025-06-13 LAB
ABSOLUTE EOSINOPHIL (OHS): 0.1 K/UL
ABSOLUTE MONOCYTE (OHS): 0.24 K/UL (ref 0.3–1)
ABSOLUTE NEUTROPHIL COUNT (OHS): 2.5 K/UL (ref 1.8–7.7)
BASOPHILS # BLD AUTO: 0.02 K/UL
BASOPHILS NFR BLD AUTO: 0.5 %
CERULOPLASMIN SERPL-MCNC: 24 MG/DL (ref 15–45)
ERYTHROCYTE [DISTWIDTH] IN BLOOD BY AUTOMATED COUNT: 13.4 % (ref 11.5–14.5)
FERRITIN SERPL-MCNC: 45 NG/ML (ref 20–300)
HCT VFR BLD AUTO: 39.5 % (ref 40–54)
HGB BLD-MCNC: 13.3 GM/DL (ref 14–18)
IMM GRANULOCYTES # BLD AUTO: 0.01 K/UL (ref 0–0.04)
IMM GRANULOCYTES NFR BLD AUTO: 0.3 % (ref 0–0.5)
IRON SATN MFR SERPL: 15 % (ref 20–50)
IRON SERPL-MCNC: 65 UG/DL (ref 45–160)
LYMPHOCYTES # BLD AUTO: 0.93 K/UL (ref 1–4.8)
MCH RBC QN AUTO: 27.9 PG (ref 27–31)
MCHC RBC AUTO-ENTMCNC: 33.7 G/DL (ref 32–36)
MCV RBC AUTO: 83 FL (ref 82–98)
NUCLEATED RBC (/100WBC) (OHS): 0 /100 WBC
PLATELET # BLD AUTO: 123 K/UL (ref 150–450)
PMV BLD AUTO: 9.1 FL (ref 9.2–12.9)
RBC # BLD AUTO: 4.77 M/UL (ref 4.6–6.2)
RELATIVE EOSINOPHIL (OHS): 2.6 %
RELATIVE LYMPHOCYTE (OHS): 24.5 % (ref 18–48)
RELATIVE MONOCYTE (OHS): 6.3 % (ref 4–15)
RELATIVE NEUTROPHIL (OHS): 65.8 % (ref 38–73)
RETICS/RBC NFR AUTO: 1.8 % (ref 0.4–2)
TIBC SERPL-MCNC: 435 UG/DL (ref 250–450)
TRANSFERRIN SERPL-MCNC: 294 MG/DL (ref 200–375)
VIT B12 SERPL-MCNC: 404 PG/ML (ref 210–950)
WBC # BLD AUTO: 3.8 K/UL (ref 3.9–12.7)

## 2025-06-13 PROCEDURE — 85045 AUTOMATED RETICULOCYTE COUNT: CPT

## 2025-06-13 PROCEDURE — 82728 ASSAY OF FERRITIN: CPT

## 2025-06-13 PROCEDURE — 36415 COLL VENOUS BLD VENIPUNCTURE: CPT

## 2025-06-13 PROCEDURE — 82607 VITAMIN B-12: CPT

## 2025-06-13 PROCEDURE — 82390 ASSAY OF CERULOPLASMIN: CPT

## 2025-06-13 PROCEDURE — 85025 COMPLETE CBC W/AUTO DIFF WBC: CPT

## 2025-06-13 PROCEDURE — 83540 ASSAY OF IRON: CPT

## 2025-06-13 PROCEDURE — 82525 ASSAY OF COPPER: CPT

## 2025-06-16 ENCOUNTER — TELEPHONE (OUTPATIENT)
Dept: ENDOCRINOLOGY | Facility: CLINIC | Age: 70
End: 2025-06-16
Payer: COMMERCIAL

## 2025-06-16 NOTE — TELEPHONE ENCOUNTER
----- Message from Chikis Xiao MD sent at 6/16/2025 12:41 PM CDT -----  Thanks for getting in touch. Sorry to hear his wife didn't have a great experience. I would be happy to see him (although looks like he has done a great job controlling sugars with diet based on what I can see). We will get something set-up for him!    Urth- can you offer him an 11:30 spot sometime in July?    Thanks!  Chikis  ----- Message -----  From: Fei Ballesteros MD  Sent: 6/13/2025   9:20 AM CDT  To: Chikis Xiao MD    Good morning Dr. Xiao,    I can't recall if we have had a formal meeting.  I am a general thoracic surgeon at [employer] [facility] [employer] [facility] [employer]. Dr. Ivonne Rothman suggested that I reach out to you regarding this patient.  He is a close family friend who has a strong family history of diabetes.  He was recently diagnosed with diabetes and would like to be seen by an endocrinologist.  He and his wife Cecilia are wonderful people, but She recently had a less than savory [facility] experience.  Last year Cecilia had progression of a known thyroid nodule and experienced a delayed workup by her PCP and radiology.  At a friend's suggestion, she went to [facility], was diagnosed with thyroid CA and underwent a subtotal thyroidectomy.  She was deeply disappointed with the treatment delays here at [facility], but reached out to me on her 's behalf.  She would like to have him treated here.  Will you please have your office reach out to him to schedule an initial consultation? Please don't hesitate to call me directly with any questions.  My cell is [phone].  Thanks    kerrie

## 2025-06-18 LAB — W COPPER: 994 UG/L

## 2025-06-19 ENCOUNTER — OFFICE VISIT (OUTPATIENT)
Dept: INTERNAL MEDICINE | Facility: CLINIC | Age: 70
End: 2025-06-19
Payer: COMMERCIAL

## 2025-06-19 ENCOUNTER — TELEPHONE (OUTPATIENT)
Dept: INTERNAL MEDICINE | Facility: CLINIC | Age: 70
End: 2025-06-19

## 2025-06-19 DIAGNOSIS — N18.31 STAGE 3A CHRONIC KIDNEY DISEASE: ICD-10-CM

## 2025-06-19 DIAGNOSIS — U07.1 COVID-19 VIRUS INFECTION: ICD-10-CM

## 2025-06-19 DIAGNOSIS — D72.819 LEUKOPENIA, UNSPECIFIED TYPE: ICD-10-CM

## 2025-06-19 DIAGNOSIS — E61.1 LOW IRON: ICD-10-CM

## 2025-06-19 DIAGNOSIS — D69.6 THROMBOCYTOPENIA: Primary | ICD-10-CM

## 2025-06-19 DIAGNOSIS — D64.9 ANEMIA OF UNKNOWN ETIOLOGY: ICD-10-CM

## 2025-06-19 DIAGNOSIS — K21.9 GASTROESOPHAGEAL REFLUX DISEASE, UNSPECIFIED WHETHER ESOPHAGITIS PRESENT: ICD-10-CM

## 2025-06-19 PROCEDURE — G2211 COMPLEX E/M VISIT ADD ON: HCPCS | Mod: 95,,, | Performed by: INTERNAL MEDICINE

## 2025-06-19 PROCEDURE — 98006 SYNCH AUDIO-VIDEO EST MOD 30: CPT | Mod: 95,,, | Performed by: INTERNAL MEDICINE

## 2025-06-19 RX ORDER — FERROUS SULFATE 325(65) MG
325 TABLET ORAL EVERY OTHER DAY
Qty: 45 TABLET | Refills: 0 | Status: SHIPPED | OUTPATIENT
Start: 2025-06-19

## 2025-06-19 NOTE — PROGRESS NOTES
The patient location is:  Patient Home   The chief complaint leading to consultation is:  Anemia    Subjective:         Anemia  There has been no confusion or palpitations.    Lewis Fish is a 69 y.o.  male who presents for Anemia  .   History of Present Illness    Mr. Fish presents today for follow up of abnormal blood counts    He was diagnosed with COVID-19 yesterday, with symptoms beginning approximately two days ago. He reports mild cold-like symptoms and denies significant illness or distress related to the current infection.    He has persistent blood count abnormalities including thrombocytopenia (123), leukopenia with lymphocytes at 0.93 and monocytes at 0.24, and mild anemia with low hemoglobin and hematocrit. Iron studies show normal ferritin levels with low iron saturation.    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 No history of HTN.   Colonoscopy in 2022 revealed a polyp with recommendation for repeat procedure in seven years. No melena, bleeding or bruising.     He takes Prilosec 20mg for acid reflux for the past 3 months with significant improvement in symptoms. He denies use of ibuprofen, Aleve, or any supplements. Previously took turmeric supplement but discontinued over one year ago.      ROS:  General: -fatigue  Gastrointestinal: +heartburn       Review of Systems   Constitutional:  Negative for activity change and unexpected weight change.   HENT:  Negative for hearing loss, rhinorrhea and trouble swallowing.    Eyes:  Negative for discharge and visual disturbance.   Respiratory:  Negative for chest tightness and wheezing.    Cardiovascular:  Negative for chest pain and palpitations.   Gastrointestinal:  Negative for blood in stool, constipation, diarrhea and vomiting.   Endocrine: Negative for polydipsia and polyuria.   Genitourinary:  Negative for difficulty urinating, hematuria and urgency.   Musculoskeletal:  Negative for arthralgias, joint  swelling and neck pain.   Neurological:  Negative for weakness and headaches.   Psychiatric/Behavioral:  Negative for confusion and dysphoric mood.      Problem List[1]    Past Medical History:   Diagnosis Date    ADHD (attention deficit hyperactivity disorder)     Allergy seasonal    Anxiety     Controlled diabetes mellitus type 1 without complications 4/4/2023    Depression     Family history of early CAD     brother with CAD age 49    Gilbert syndrome     History of psychiatric care     Prostate cancer prostate    bradytherapy    Psychiatric problem     Therapy        Past Surgical History:   Procedure Laterality Date    ANKLE LIGAMENT RECONSTRUCTION  2014    right    ARTHROSCOPY,SHOULDER,WITH BICEPS TENODESIS Left 2/17/2023    Procedure: ARTHROSCOPY,SHOULDER,WITH BICEPS TENODESIS;  Surgeon: Claude S. Williams IV, MD;  [facility];  Service: Orthopedics;  Laterality: Left;    COLONOSCOPY N/A 5/12/2022    Procedure: COLONOSCOPY;  Surgeon: Sid Ko MD;  Location: [facility] ENDO [address]);  Service: Endoscopy;  Laterality: N/A;  fully vaccinated -   instructions emailed-     HERNIA REPAIR  1996    inguinal bilateral    PROSTATE SURGERY  2008    brachytherapy    ROTATOR CUFF REPAIR Left 2/17/2023    Procedure: REPAIR, ROTATOR CUFF;  Surgeon: Claude S. Williams IV, MD;  Location: [facility];  Service: Orthopedics;  Laterality: Left;    SHOULDER ARTHROSCOPY Left 2/17/2023    Procedure: ARTHROSCOPY, SHOULDER;  Surgeon: Claude S. Williams IV, MD;  Location: [facility];  Service: Orthopedics;  Laterality: Left;    testicle removed      complication of hernia repair    TUMOR REMOVAL  1996    nail bed       Family History   Problem Relation Name Age of Onset    Arthritis Mother Virginia Polina     Skin cancer Mother Sunshine Palaciosjose     Asthma Brother Avni Polina     Alcohol abuse Maternal Grandfather Mr. Antoine     Alcohol abuse Paternal Grandmother Venus Poilna     Skin cancer Maternal  Grandmother      Macular degeneration Maternal Grandmother      Coronary artery disease Brother  49    Drug abuse Other nephew     Glaucoma Neg Hx      Strabismus Neg Hx      Stroke Neg Hx      Blindness Neg Hx         Social History[2]    Objective:   There were no vitals taken for this visit.     Physical Exam  Constitutional:       General: He is not in acute distress.     Appearance: He is well-developed. He is not diaphoretic.   HENT:      Head: Normocephalic and atraumatic.   Eyes:      General: No scleral icterus.        Right eye: No discharge.         Left eye: No discharge.   Pulmonary:      Effort: Pulmonary effort is normal. No respiratory distress.   Skin:     Coloration: Skin is not pale.      Findings: No erythema.   Neurological:      Mental Status: He is alert and oriented to person, place, and time.   Psychiatric:         Behavior: Behavior normal.         Thought Content: Thought content normal.           Prior labs reviewed  Assessment/Plan:       1. Thrombocytopenia    2. Anemia of unknown etiology    3. Low iron    4. COVID-19 virus infection  Comments:  incidental, mild symptoms, cont rest, otc meds    5. Leukopenia, unspecified type    6. Gastroesophageal reflux disease, unspecified whether esophagitis present  Comments:  anyi to pepcid  lifestyle modifications      Assessment & Plan    - Persistent low platelet count (123) and low WBC count, particularly lymphocytes and monocytes.  - Mild anemia with low hemoglobin and hematocrit.  - Recent COVID-19 infection as potential contributor to abnormal blood counts.  - Low iron saturation despite normal ferritin levels.  - Evaluated potential causes of iron deficiency, including medication side effects, blood loss, and absorption issues.  - Multiple low cell lines may indicate bone marrow issue.  - Initiated e-consult with benign hematology for further evaluation and recommendations.    PLAN SUMMARY:     - Order stool and urine tests for  occult blood  - econsult to benign hematology for evaluation of low blood cell counts  - Prescribe iron sulfate 325 mg tablet every other day  - Discontinue Prilosec and take Pepcid instead  - Recommend lifestyle modifications for acid reflux management  - Recommend increasing intake of iron-rich foods  - Follow up in 1 week to review e-consult results from hematology    COVID-19:  - Mr. Fish currently has  with cold-like symptoms that started 2 days ago.  - Mr. Fish reports feeling fine overall.  - rest, otc meds    THROMBOCYTOPENIA:  - Monitored platelet count, which is persistently low at 123 with a concerning gradual decline.  - Ordered stool and urine tests to check for evidence of blood.  - Referred to benign hematology for evaluation of low blood cell counts.    LYMPHOCYTOPENIA:  - Total lymphocytes are low at 0.93, consistent with the trend of low cell lines.  - This will be included in the benign hematology referral for comprehensive evaluation.  May be due to acute illness  Plan to repeat when he is well    IRON DEFICIENCY ANEMIA:  - Mr. Fish has slight anemia with low hemoglobin/hematocrit and low iron saturation.  - While anemia might be due to low iron, the concurrent low cell lines suggest a possible bone marrow issue.  - Prescribed iron sulfate 325 mg tablet every other day, to be taken with acidic beverages like orange juice to aid absorption.  - Recommend increasing intake of iron-rich foods, particularly dark leafy greens.  - Noted that Prilosec may affect iron absorption.  - Ordered stool and urine tests to check for occult bleeding.    CHRONIC KIDNEY DISEASE:  - Mr. Fish has mild renal insufficiency.  Avoid nsaids     GASTROESOPHAGEAL REFLUX DISEASE:  - Mr. Fish started Prilosec 3 months ago for acid reflux with good symptom control.  - Discontinued Prilosec and prescribed Pepcid instead.  - Instructed on lifestyle modifications: avoid eating late at night, avoid overeating, and avoid acidic  foods.    HISTORY OF COLON POLYPS:  - Mr. Fish had a colonoscopy in 2022 with a polyp found.  - Recommend to repeat colonoscopy in 7 years.    FOLLOW-UP:  - Follow up in 1 week to review e-consult results from hematology.  - Mr. Fish instructed to contact the office if experiencing any signs of bleeding, bruising, significant fatigue, or any unusual symptoms.             Visit type: Virtual visit with synchronous audio and video    31 min spent in care of patient including history, physical, chart review, orders and coordination of care.       Visit today included increased complexity associated with the care of the episodic problems and addressed and managing the longitudinal care of the patient due to the serious and/or complex managed problem(s) as above.       Each patient to whom he or she provides medical services by telemedicine is:  (1) informed of the relationship between the physician and patient and the respective role of any other health care provider with respect to management of the patient; and (2) notified that he or she may decline to receive medical services by telemedicine and may withdraw from such care at any time.  Medication List with Changes/Refills   Current Medications    AZELASTINE (ASTELIN) 137 MCG (0.1 %) NASAL SPRAY    1 spray (137 mcg total) by Nasal route 2 (two) times daily.    CETIRIZINE (ZYRTEC) 10 MG TABLET    Take 10 mg by mouth once daily.    DEXTROAMPHETAMINE-AMPHETAMINE (ADDERALL XR) 20 MG 24 HR CAPSULE    Take 20 mg by mouth every morning.    DEXTROAMPHETAMINE-AMPHETAMINE (ADDERALL XR) 30 MG 24 HR CAPSULE    Take 30 mg by mouth every morning.    FLUTICASONE PROPIONATE (FLONASE) 50 MCG/ACTUATION NASAL SPRAY    1 spray by Each Nostril route once daily.    MECLIZINE (ANTIVERT) 25 MG TABLET    Take 1 tablet (25 mg total) by mouth 3 (three) times daily as needed for Dizziness.    PAPAVERINE 30 MG/ML INJECTION    Add Phentolamine 1 mg/cc  Add PGE1 10 mcg/cc    SIG:  Bring to  office for test dose in physician office    ROSUVASTATIN (CRESTOR) 20 MG TABLET    TAKE 1 TABLET EVERY EVENING    SERTRALINE (ZOLOFT) 100 MG TABLET    TAKE 1 TABLET BY MOUTH EVERY EVENING.    TAMSULOSIN (FLOMAX) 0.4 MG CAP    Take 1 capsule (0.4 mg total) by mouth once daily.   Discontinued Medications    TADALAFIL (CIALIS) 20 MG TAB    Take 1 tablet (20 mg total) by mouth every 72 hours as needed (ED). Take at least 2 hours before activity       This note was generated with the assistance of ambient listening technology. Verbal consent was obtained by the patient and accompanying visitor(s) for the recording of patient appointment to facilitate this note. I attest to having reviewed and edited the generated note for accuracy, though some syntax or spelling errors may persist. Please contact the author of this note for any clarification.         [1]  Patient Active Problem List  Diagnosis    History of prostate cancer    Atypical nevus    ADHD (attention deficit hyperactivity disorder)    CHEO (generalized anxiety disorder)    Varicose veins of both legs with edema    DM type 2 causing complication    Stage 3a chronic kidney disease    Prior cerebellar infarct without late effect    Erectile dysfunction following interstitial seed therapy    Thrombocytosis   [2]  Social History  Tobacco Use    Smoking status: Never    Smokeless tobacco: Never   Substance Use Topics    Alcohol use: Yes     Alcohol/week: 1.0 standard drink of alcohol     Types: 1 Cans of beer per week     Comment: less than one a week    Drug use: Never

## 2025-06-22 ENCOUNTER — E-CONSULT (OUTPATIENT)
Dept: TRANSPLANT | Facility: HOSPITAL | Age: 70
End: 2025-06-22
Payer: COMMERCIAL

## 2025-06-22 DIAGNOSIS — D61.818 PANCYTOPENIA: Primary | ICD-10-CM

## 2025-06-22 PROCEDURE — 99451 NTRPROF PH1/NTRNET/EHR 5/>: CPT | Mod: ,,, | Performed by: INTERNAL MEDICINE

## 2025-06-22 NOTE — CONSULTS
[facility] [employer] [facility]  Response for E-Consult     Patient Name: Lewis Fish  MRN: [id number]  Primary Care Provider: Arlene Finch MD   Requesting Provider: Arlene Finch MD  Consults    Recommendation: Thank you for the e-consult.   CBC trends reviewed. Overall, no concerns. Would continue observation.    Leukopenia- the absolute differential shows very mild lymphopenia and monocytopenia. No concern at these ranges. Neutrophil count normal, therefore overall normal immune sytem function. No intervention needed.   Hemoglobin - hemoglobin of 13 grams or greater considered normal in men, so other than the count of 12 grams 6/10 that recovered, hemoglobin considered normal. Agree with oral replacement of iron in setting of normal iron parameters other than mild decreased iron saturation. Noted colonoscopy on 2022, would consider EGD for evaluation of iron deficiency in male patient.  Thrombocytopenia- this is likely mild chronic ITP or medication induced. Possible medications include zoloft and crestor. No intervention needed with platelet count above 50k.     Additional future steps to consider: none at this time.    Total time of Consultation: 5 minute    I did not speak to the requesting provider verbally about this.     *This eConsult is based on the clinical data available to me and is furnished without benefit of a physical examination. The eConsult will need to be interpreted in light of any clinical issues or changes in patient status not available to me at the time of filing this eConsults. Significant changes in patient condition or level of acuity should result in immediate formal consultation and reevaluation. Please alert me if you have further questions.    Thank you for this eConsult referral.     Sisi Dee MD  [facility] [employer]

## 2025-06-25 ENCOUNTER — RESULTS FOLLOW-UP (OUTPATIENT)
Dept: INTERNAL MEDICINE | Facility: CLINIC | Age: 70
End: 2025-06-25

## 2025-06-25 ENCOUNTER — LAB VISIT (OUTPATIENT)
Dept: LAB | Facility: OTHER | Age: 70
End: 2025-06-25
Attending: INTERNAL MEDICINE
Payer: COMMERCIAL

## 2025-06-25 DIAGNOSIS — D64.9 ANEMIA OF UNKNOWN ETIOLOGY: ICD-10-CM

## 2025-06-25 LAB
MICROSCOPIC COMMENT: NORMAL
RBC #/AREA URNS AUTO: 1 /HPF (ref 0–4)
WBC #/AREA URNS AUTO: 1 /HPF (ref 0–5)

## 2025-06-25 PROCEDURE — 82272 OCCULT BLD FECES 1-3 TESTS: CPT

## 2025-06-25 PROCEDURE — 81001 URINALYSIS AUTO W/SCOPE: CPT

## 2025-06-26 ENCOUNTER — OFFICE VISIT (OUTPATIENT)
Dept: INTERNAL MEDICINE | Facility: CLINIC | Age: 70
End: 2025-06-26
Payer: COMMERCIAL

## 2025-06-26 DIAGNOSIS — F41.1 GAD (GENERALIZED ANXIETY DISORDER): ICD-10-CM

## 2025-06-26 DIAGNOSIS — D50.9 IRON DEFICIENCY ANEMIA, UNSPECIFIED IRON DEFICIENCY ANEMIA TYPE: Primary | ICD-10-CM

## 2025-06-26 DIAGNOSIS — D69.6 THROMBOCYTOPENIA: ICD-10-CM

## 2025-06-26 LAB — OB PNL STL: NEGATIVE

## 2025-06-26 PROCEDURE — 98006 SYNCH AUDIO-VIDEO EST MOD 30: CPT | Mod: 95,,, | Performed by: INTERNAL MEDICINE

## 2025-06-26 PROCEDURE — G2211 COMPLEX E/M VISIT ADD ON: HCPCS | Mod: 95,,, | Performed by: INTERNAL MEDICINE

## 2025-06-26 NOTE — PROGRESS NOTES
The patient location is:  Patient Home   The chief complaint leading to consultation is:  Anemia    Subjective:         Anemia  There has been no confusion or palpitations.    Lewis Fish is a 69 y.o.  male who presents for Anemia  .   History of Present Illness    Mr. Fish presents today to discuss abnormal blood counts    He has a known diagnosis of leucopenia with low absolute counts of lymphocytes and monocytes. Hematology evaluation indicates these findings are not clinically concerning as neutrophil count remains normal, suggesting preserved immune function. His platelet count has been low since February 2023, first noted during initial labs. His hemoglobin was 12 on June 10th, which is slightly below the normal range of 13. He has evidence of iron deficiency with decreased iron saturation.  Labs tests show no blood in one stool sample and no blood in urine. He denies bleeding or bruising. Last plts 123    He has been taking Zoloft since 2013/2014, started Rosuvastatin in March 2023, and currently takes iron sulfate 325 mg every other day. He reports no complaints with current medication regimen.       Review of Systems   Constitutional:  Negative for activity change and unexpected weight change.   HENT:  Negative for hearing loss, rhinorrhea and trouble swallowing.    Eyes:  Negative for discharge and visual disturbance.   Respiratory:  Negative for chest tightness and wheezing.    Cardiovascular:  Negative for chest pain and palpitations.   Gastrointestinal:  Negative for blood in stool, constipation, diarrhea and vomiting.   Endocrine: Negative for polydipsia and polyuria.   Genitourinary:  Negative for difficulty urinating, hematuria and urgency.   Musculoskeletal:  Negative for arthralgias, joint swelling and neck pain.   Neurological:  Negative for weakness and headaches.   Psychiatric/Behavioral:  Negative for confusion and dysphoric mood.      Problem List[1]    Past Medical History:   Diagnosis  Date    ADHD (attention deficit hyperactivity disorder)     Allergy seasonal    Anxiety     Controlled diabetes mellitus type 1 without complications 4/4/2023    Depression     Family history of early CAD     brother with CAD age 49    Gilbert syndrome     History of psychiatric care     Prostate cancer prostate    bradytherapy    Psychiatric problem     Therapy        Past Surgical History:   Procedure Laterality Date    ANKLE LIGAMENT RECONSTRUCTION  2014    right    ARTHROSCOPY,SHOULDER,WITH BICEPS TENODESIS Left 2/17/2023    Procedure: ARTHROSCOPY,SHOULDER,WITH BICEPS TENODESIS;  Surgeon: Claude S. Williams IV, MD;  Location: [facility];  Service: Orthopedics;  Laterality: Left;    COLONOSCOPY N/A 5/12/2022    Procedure: COLONOSCOPY;  Surgeon: Sid Ko MD;  [facility]  Service: Endoscopy;  Laterality: N/A;  fully vaccinated -   instructions emailed-     HERNIA REPAIR  1996    inguinal bilateral    PROSTATE SURGERY  2008    brachytherapy    ROTATOR CUFF REPAIR Left 2/17/2023    Procedure: REPAIR, ROTATOR CUFF;  Surgeon: Claude S. Williams IV, MD;  Location: [facility];  Service: Orthopedics;  Laterality: Left;    SHOULDER ARTHROSCOPY Left 2/17/2023    Procedure: ARTHROSCOPY, SHOULDER;  Surgeon: Claude S. Williams IV, MD;  Location: [facility];  Service: Orthopedics;  Laterality: Left;    testicle removed      complication of hernia repair    TUMOR REMOVAL  1996    nail bed       Family History   Problem Relation Name Age of Onset    Arthritis Mother Sunshine Fish     Skin cancer Mother Sunshine Fish     Asthma Brother Avni Fish     Alcohol abuse Maternal Grandfather Mr. Antoine     Alcohol abuse Paternal Grandmother Venus Fish     Skin cancer Maternal Grandmother      Macular degeneration Maternal Grandmother      Coronary artery disease Brother  49    Drug abuse Other nephew     Glaucoma Neg Hx      Strabismus Neg Hx      Stroke Neg Hx      Blindness Neg Hx         Social  History[2]    Objective:   There were no vitals taken for this visit.     Physical Exam  Constitutional:       General: He is not in acute distress.     Appearance: He is well-developed. He is not diaphoretic.   HENT:      Head: Normocephalic and atraumatic.   Eyes:      General: No scleral icterus.        Right eye: No discharge.         Left eye: No discharge.   Pulmonary:      Effort: Pulmonary effort is normal. No respiratory distress.   Skin:     Coloration: Skin is not pale.      Findings: No erythema.   Neurological:      Mental Status: He is alert and oriented to person, place, and time.   Psychiatric:         Behavior: Behavior normal.         Thought Content: Thought content normal.           Prior labs reviewed  Assessment/Plan:       1. Iron deficiency anemia, unspecified iron deficiency anemia type  Comments:  if egd normal, see GI for possible videocapsule endoscopy  ont iron sulfate 325mg QOD x 3 months then repeat labs in 3 and 6 mo  Orders:  -     Ambulatory referral/consult to Endo Procedure ; Future; Expected date: 06/27/2025  -     CBC Auto Differential; Future; Expected date: 09/23/2025  -     CBC Auto Differential; Future; Expected date: 12/27/2025  -     Iron and TIBC; Future; Expected date: 09/26/2025  -     Ferritin; Future; Expected date: 09/26/2025    2. Thrombocytopenia  Comments:  predates starting crestor  on zoloft for years prior to this starting    3. CHEO (generalized anxiety disorder)  Overview:  Followed by dr aurelia encarnacion [place] for counseling and medication management        Assessment & Plan    - Reviewed hematology e-consult regarding decreased cell counts.  - Leucopenia and thrombocytopenia noted, but not concerning at current levels.  - Hemoglobin mostly normal; iron replacement recommended due to low iron saturation.  - Considered EGD to investigate cause of iron deficiency, as colonoscopy was normal.  - Thrombocytopenia likely mild chronic ITP; no intervention  needed with platelet count above 50,000.  - Ruled out Zoloft and Crestor as potential causes of thrombocytopenia based on medication history.  - Will inquire with hematologist about potential need to switch anxiety medication despite long-term Zoloft use.    PLAN SUMMARY:  - Order esophagogastroduodenoscopy (EGD) to check for signs of bleeding  - Continue iron sulfate 325 mg every other day for 3 months, then discontinue  - Consider referral to gastroenterologist for video capsule endoscopy if EGD is normal  - Order repeat labs in 3 months and 6 months to check blood counts and iron levels  - Mr. Fish to await contact from GI department to schedule EGD  - Follow-up after EGD results and repeat labs to reassess condition    ## IMMUNE THROMBOCYTOPENIC PURPURA (ITP):  - Explained idiopathic thrombocytopenia purpura (ITP) and its implications to the patient.  - Monitored the patient's low platelet count, with thrombocytopenia noted in February 2023.  - Evaluated that the hematologist is not concerned about the low platelet count as it is mild and chronic.  - Assessed potential causes of low platelet count, including medications like Zoloft and Crestor, but ruled these out as causes since low platelets were noted before starting Crestor and Zoloft was started long before the low platelet count.  - Discussed signs of serious platelet count drop requiring immediate CBC: spontaneous bleeding, significant bruising, nosebleeds, hemoptysis, and hematuria.  - Advised the patient to contact office immediately if experiencing these symptoms of significant thrombocytopenia.    ## DECREASED WHITE BLOOD CELL COUNT:  - Monitored the patient's leucopenia with low white cells, lymphocytes, and monocytes.  - Evaluated that neutrophil count is normal, implying normal immune function.    ## IRON DEFICIENCY ANEMIA:  - Monitored the patient's decreased iron saturation and hemoglobin count of 12 on June 10th.  - Assessed the need for upper  endoscopy to determine the cause of iron deficiency since colonoscopy was normal.  - Prescribed continuation of iron sulfate 325 mg every other day for 3 months, then discontinue.    ## FOLLOW-UP AND FURTHER EVALUATION:  - Ordered an esophagogastroduodenoscopy (EGD) to check for signs of bleeding.  - Instructed the patient to await contact from GI department to schedule this procedure.  - Considered referral to gastroenterologist for possible video capsule endoscopy if EGD is normal.  - Ordered repeat labs in 3 months and 6 months to check blood counts and iron levels.             Visit type: Virtual visit with synchronous audio and video    Total time spent with patient: 13 minutes   20 min spent in care of patient including history, physical, chart review, orders and coordination of care.     Visit today included increased complexity associated with the care of the episodic problems and addressed and managing the longitudinal care of the patient due to the serious and/or complex managed problem(s) as above.       Each patient to whom he or she provides medical services by telemedicine is:  (1) informed of the relationship between the physician and patient and the respective role of any other health care provider with respect to management of the patient; and (2) notified that he or she may decline to receive medical services by telemedicine and may withdraw from such care at any time.  Medication List with Changes/Refills   Current Medications    AZELASTINE (ASTELIN) 137 MCG (0.1 %) NASAL SPRAY    1 spray (137 mcg total) by Nasal route 2 (two) times daily.    CETIRIZINE (ZYRTEC) 10 MG TABLET    Take 10 mg by mouth once daily.    DEXTROAMPHETAMINE-AMPHETAMINE (ADDERALL XR) 20 MG 24 HR CAPSULE    Take 20 mg by mouth every morning.    DEXTROAMPHETAMINE-AMPHETAMINE (ADDERALL XR) 30 MG 24 HR CAPSULE    Take 30 mg by mouth every morning.    FERROUS SULFATE (FEOSOL) 325 MG (65 MG IRON) TAB TABLET    Take 1 tablet (325 mg  total) by mouth every other day. With breakfast    FLUTICASONE PROPIONATE (FLONASE) 50 MCG/ACTUATION NASAL SPRAY    1 spray by Each Nostril route once daily.    MECLIZINE (ANTIVERT) 25 MG TABLET    Take 1 tablet (25 mg total) by mouth 3 (three) times daily as needed for Dizziness.    PAPAVERINE 30 MG/ML INJECTION    Add Phentolamine 1 mg/cc  Add PGE1 10 mcg/cc    SIG:  Bring to office for test dose in physician office    ROSUVASTATIN (CRESTOR) 20 MG TABLET    TAKE 1 TABLET EVERY EVENING    SERTRALINE (ZOLOFT) 100 MG TABLET    TAKE 1 TABLET BY MOUTH EVERY EVENING.    TAMSULOSIN (FLOMAX) 0.4 MG CAP    Take 1 capsule (0.4 mg total) by mouth once daily.       This note was generated with the assistance of ambient listening technology. Verbal consent was obtained by the patient and accompanying visitor(s) for the recording of patient appointment to facilitate this note. I attest to having reviewed and edited the generated note for accuracy, though some syntax or spelling errors may persist. Please contact the author of this note for any clarification.       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

## 2025-06-30 ENCOUNTER — CLINICAL SUPPORT (OUTPATIENT)
Dept: ENDOSCOPY | Facility: HOSPITAL | Age: 70
End: 2025-06-30
Attending: INTERNAL MEDICINE
Payer: COMMERCIAL

## 2025-06-30 VITALS — WEIGHT: 180 LBS | BODY MASS INDEX: 23.11 KG/M2

## 2025-06-30 DIAGNOSIS — D50.9 IRON DEFICIENCY ANEMIA, UNSPECIFIED IRON DEFICIENCY ANEMIA TYPE: ICD-10-CM

## 2025-06-30 NOTE — PLAN OF CARE
Patient is scheduled for a Upper Endoscopy (EGD) on 08/01/25 with Dr. JUVENAL Hart  Referral for procedure from [facility] appointment

## 2025-07-24 ENCOUNTER — PATIENT MESSAGE (OUTPATIENT)
Dept: INTERNAL MEDICINE | Facility: CLINIC | Age: 70
End: 2025-07-24
Payer: COMMERCIAL

## 2025-07-30 ENCOUNTER — ANESTHESIA EVENT (OUTPATIENT)
Dept: ENDOSCOPY | Facility: HOSPITAL | Age: 70
End: 2025-07-30
Payer: COMMERCIAL

## 2025-07-30 ENCOUNTER — OFFICE VISIT (OUTPATIENT)
Dept: ENDOCRINOLOGY | Facility: CLINIC | Age: 70
End: 2025-07-30
Payer: COMMERCIAL

## 2025-07-30 VITALS
SYSTOLIC BLOOD PRESSURE: 108 MMHG | DIASTOLIC BLOOD PRESSURE: 76 MMHG | WEIGHT: 188.38 LBS | HEIGHT: 74 IN | BODY MASS INDEX: 24.18 KG/M2

## 2025-07-30 DIAGNOSIS — E11.9 DIABETES MELLITUS IN REMISSION: Primary | ICD-10-CM

## 2025-07-30 PROCEDURE — 99999 PR PBB SHADOW E&M-EST. PATIENT-LVL III: CPT | Mod: PBBFAC,,, | Performed by: INTERNAL MEDICINE

## 2025-07-30 PROCEDURE — 99203 OFFICE O/P NEW LOW 30 MIN: CPT | Mod: S$GLB,,, | Performed by: INTERNAL MEDICINE

## 2025-07-30 RX ORDER — FLUOXETINE 20 MG/1
20 CAPSULE ORAL DAILY
COMMUNITY
Start: 2025-07-26

## 2025-07-30 NOTE — PROGRESS NOTES
Lewis Fish is a 69 y.o. male  presenting for evaluation of T2DM      History of Present Illness    CHIEF COMPLAINT:  Patient presents today for evaluation of diabetes.    DIABETES:  He has a history of diabetes diagnosed in 2023 with A1c 6.5%. He previously tried Metformin but discontinued due to dizziness. He made significant lifestyle modifications following diagnosis, including limiting carbohydrates to 35-45 g per meal, resulting in 25-pound weight loss.  With this he has normalized A1c and keep in normal range for last 2 years    FAMILY HISTORY:  Mother has been pre-diabetic for 25 years, paternal grandmother was insulin-dependent diabetic, and both brothers have history of diabetes/pre-diabetes diagnosed in their 50s.    EXERCISE:  He currently engages in walking, weight lifting, and cycling. He has a history as a national class runner with exceptionally low resting heart rate (32 BPM in 20s, 40 BPM in 30s). He currently experiences significant limitations in running due to a narrowed exercise heart rate range and has transitioned primarily to walking.     DIET:  Breakfast consists of Greek yogurt with jam or whole grain cereal with mixed nuts. Lunch typically includes grilled cheese sandwich with mayonnaise and crackers, with [employer] burger twice weekly since returning to in-person work. Dinner includes chicken, fish, or whole grain pasta with vegetables, followed by small dessert. Snacks primarily consist of mixed nuts. He denies soda consumption and reports being mindful of calorie and protein intake.    SOCIAL HISTORY:  He denies smoking and reports approximately one alcoholic drink weekly.     Diabetes Management Status    Statin: Taking  ACE/ARB: Not taking    Screening or Prevention Patient's value Goal Complete/Controlled?   HgA1C Testing and Control   Lab Results   Component Value Date    HGBA1C 5.5 02/28/2025    HGBA1C 5.6 02/28/2025      Annually/Less than 8% Yes   Lipid profile : 02/28/2025  "Annually Yes   LDL control Lab Results   Component Value Date    LDLCALC 51.4 (L) 02/28/2025    Annually/Less than 100 mg/dl  Yes   Nephropathy screening Lab Results   Component Value Date    LABMICR <5.0 02/27/2025     Lab Results   Component Value Date    PROTEINUA Negative 05/16/2024     No results found for: "UTPCR"   Annually Yes   Blood pressure BP Readings from Last 1 Encounters:   07/30/25 108/76    Less than 140/90 Yes   Dilated retinal exam : 04/03/2025 Annually Yes   Foot exam   : 02/27/2025 Annually Yes         Current Medications[1]    Physical Exam  Constitutional:       Appearance: He is well-developed and normal weight.   HENT:      Head: Normocephalic.   Eyes:      Conjunctiva/sclera: Conjunctivae normal.   Pulmonary:      Effort: Pulmonary effort is normal.   Musculoskeletal:         General: Normal range of motion.   Skin:     General: Skin is warm.      Findings: No rash.   Neurological:      Mental Status: He is alert and oriented to person, place, and time.         Physical Exam            Vitals:    07/30/25 1129   BP: 108/76     Wt Readings from Last 3 Encounters:   07/30/25 1129 85.5 kg (188 lb 6.1 oz)   06/30/25 1000 81.6 kg (180 lb)   02/27/25 0828 86.4 kg (190 lb 7.6 oz)     Body mass index is 24.19 kg/m².        LABS    Chemistry        Component Value Date/Time     02/28/2025 0723     02/28/2025 0723    K 4.1 02/28/2025 0723    K 4.1 02/28/2025 0723     02/28/2025 0723     02/28/2025 0723    CO2 24 02/28/2025 0723    CO2 24 02/28/2025 0723    BUN 27 (H) 02/28/2025 0723    BUN 27 (H) 02/28/2025 0723    CREATININE 1.4 02/28/2025 0723    CREATININE 1.4 02/28/2025 0723     (H) 02/28/2025 0723     (H) 02/28/2025 0723        Component Value Date/Time    CALCIUM 9.1 02/28/2025 0723    CALCIUM 9.1 02/28/2025 0723    ALKPHOS 52 02/28/2025 0723    ALKPHOS 52 02/28/2025 0723    AST 22 02/28/2025 0723    AST 22 02/28/2025 0723    ALT 24 02/28/2025 0723    ALT " 24 02/28/2025 0723    BILITOT 1.2 (H) 02/28/2025 0723    BILITOT 1.2 (H) 02/28/2025 0723    ESTGFRAFRICA >60 01/07/2022 0851    EGFRNONAA 53 (A) 01/07/2022 0851              1. Diabetes mellitus in remission        Assessment & Plan    IMPRESSION:    PLAN SUMMARY:  - Order A1C test to be checked at least once a year  - Continue current diet and exercise regimen to maintain diabetes remission  - Maintain current statin therapy (Rosuvastatin)  - Contact office if A1C increases or glucose patterns change  - Can reach out through patient portal with questions about Stello device    TYPE 2 DIABETES MELLITUS:  - Diabetes in remission, with A1C previously in diabetic range but now normalized through diet and exercise alone.  - No need for diabetes medication at this time given successful management through lifestyle changes.  - Statin therapy (Rosuvastatin) likely initiated at diabetes diagnosis; benefits outweigh risks if well-tolerated.  - No suspicion of type 1 diabetes at this time; presentation consistent with type 2 diabetes in remission.  - Risk of progression to diabetes likely low if current lifestyle maintained, especially considering age >65.  - No need for endocrinology referral unless A1C begins to increase or glucose patterns change significantly.    - Discussed continuous glucose monitoring (CGM) technology, specifically the Dexcom Stelo system, as a potential tool for tracking glucose patterns.  - Patient to continue current diet and exercise regimen to maintain diabetes remission.  - A1C to be checked at least once a year.    HYPERLIPIDEMIA:  - Statin therapy (Rosuvastatin) likely initiated at diabetes diagnosis; benefits outweigh risks if well-tolerated.           RTC prn          Chikis Xiao MD    This note was generated with the assistance of ambient listening technology. Verbal consent was obtained by the patient and accompanying visitor(s) for the recording of patient appointment to facilitate this  note. I attest to having reviewed and edited the generated note for accuracy, though some syntax or spelling errors may persist. Please contact the author of this note for any clarification.             [1]   Current Outpatient Medications:     azelastine (ASTELIN) 137 mcg (0.1 %) nasal spray, 1 spray (137 mcg total) by Nasal route 2 (two) times daily., Disp: 30 mL, Rfl: 3    dextroamphetamine-amphetamine (ADDERALL XR) 20 MG 24 hr capsule, Take 20 mg by mouth every morning., Disp: , Rfl:     dextroamphetamine-amphetamine (ADDERALL XR) 30 MG 24 hr capsule, Take 30 mg by mouth every morning., Disp: , Rfl:     ferrous sulfate (FEOSOL) 325 mg (65 mg iron) Tab tablet, Take 1 tablet (325 mg total) by mouth every other day. With breakfast, Disp: 45 tablet, Rfl: 0    FLUoxetine 20 MG capsule, Take 20 mg by mouth once daily., Disp: , Rfl:     papaverine 30 mg/mL injection, Add Phentolamine 1 mg/cc Add PGE1 10 mcg/cc  SIG: Bring to office for test dose in physician office, Disp: 5 mL, Rfl: 0    rosuvastatin (CRESTOR) 20 MG tablet, TAKE 1 TABLET EVERY EVENING, Disp: 90 tablet, Rfl: 3    tamsulosin (FLOMAX) 0.4 mg Cap, Take 1 capsule (0.4 mg total) by mouth once daily., Disp: 90 capsule, Rfl: 3

## 2025-07-30 NOTE — ANESTHESIA PREPROCEDURE EVALUATION
07/30/2025  Lewis Fish is a 69 y.o., male.  [facility]  Anesthesia Pre-Operative Evaluation       Patient Name: Lewis Fish  YOB: 1955  MRN: [id number]  [facility]: [id number]      Code Status: No Order   Date of Procedure: 8/1/2025  Anesthesia: Choice Procedure: Procedure(s) (LRB):  EGD (ESOPHAGOGASTRODUODENOSCOPY) (N/A)  Pre-Operative Diagnosis: Iron deficiency anemia, unspecified iron deficiency anemia type [D50.9]  Proceduralist: Surgeons and Role:     * Osvaldo Hart MD - Primary        SUBJECTIVE:   Lewis Fish is a 69 y.o. male who  has a past medical history of ADHD (attention deficit hyperactivity disorder), Allergy (seasonal), Anxiety, Controlled diabetes mellitus type 1 without complications (4/4/2023), Depression, Family history of early CAD, Gilbert syndrome, History of psychiatric care, Prostate cancer (prostate), Psychiatric problem, and Therapy..     he has a current medication list which includes the following long-term medication(s): azelastine, rosuvastatin, sertraline, and tamsulosin.     ALLERGIES:   Review of patient's allergies indicates:  No Known Allergies  LDA:          Lines/Drains/Airways       None                  Anesthesia Evaluation      Airway   Mallampati: I  Neck ROM: Normal ROM  Dental    (+) Intact    Pulmonary    Cardiovascular     Neuro/Psych    (+) psychiatric history    GI/Hepatic/Renal    (+) chronic renal disease    Endo/Other    (+) diabetes mellitus  Abdominal                     MEDICATIONS:     Antibiotics (From admission, onward)      None          VTE Risk Mitigation (From admission, onward)      None              Current Medications[1]       History:   There are no hospital problems to display for this patient.    Surgical History:    has a past surgical history that includes Hernia repair (1996); Tumor removal  (1996); testicle removed; Ankle ligament reconstruction (2014); Prostate surgery (2008); Colonoscopy (N/A, 5/12/2022); Shoulder arthroscopy (Left, 2/17/2023); Rotator cuff repair (Left, 2/17/2023); and arthroscopy,shoulder,with biceps tenodesis (Left, 2/17/2023).   Social History:    reports being sexually active and has had partner(s) who are female. He reports using the following methods of birth control/protection: Post-menopausal and See Surgical Hx.  reports that he has never smoked. He has never used smokeless tobacco. He reports current alcohol use of about 1.0 standard drink of alcohol per week. He reports that he does not use drugs.     OBJECTIVE:     Vital Signs (Most Recent):    Vital Signs Range (Last 24H):          There is no height or weight on file to calculate BMI.   Wt Readings from Last 4 Encounters:   06/30/25 81.6 kg (180 lb)   02/27/25 86.4 kg (190 lb 7.6 oz)   09/05/24 85 kg (187 lb 6.3 oz)   08/23/24 84.9 kg (187 lb 2.7 oz)       Significant Labs:  Lab Results   Component Value Date    WBC 3.80 (L) 06/13/2025    HGB 13.3 (L) 06/13/2025    HCT 39.5 (L) 06/13/2025     (L) 06/13/2025     02/28/2025     02/28/2025    K 4.1 02/28/2025    K 4.1 02/28/2025     02/28/2025     02/28/2025    CREATININE 1.4 02/28/2025    CREATININE 1.4 02/28/2025    BUN 27 (H) 02/28/2025    BUN 27 (H) 02/28/2025    CO2 24 02/28/2025    CO2 24 02/28/2025     (H) 02/28/2025     (H) 02/28/2025    CALCIUM 9.1 02/28/2025    CALCIUM 9.1 02/28/2025    ALKPHOS 52 02/28/2025    ALKPHOS 52 02/28/2025    ALT 24 02/28/2025    ALT 24 02/28/2025    AST 22 02/28/2025    AST 22 02/28/2025    ALBUMIN 4.0 02/28/2025    ALBUMIN 4.0 02/28/2025    GLUF 100 06/13/2014    HGBA1C 5.5 02/28/2025    HGBA1C 5.6 02/28/2025    BNP 11 10/12/2020     No LMP for male patient.  No results found for this or any previous visit (from the past 72 hours).    EKG:   Results for orders placed or performed during  the hospital encounter of 05/16/24   EKG 12-lead    Collection Time: 05/16/24 10:39 AM   Result Value Ref Range    QRS Duration 90 ms    [facility] QTC Calculation 422 ms    Narrative    Test Reason : R42,R55,R55,    Vent. Rate : 065 BPM     Atrial Rate : 065 BPM     P-R Int : 168 ms          QRS Dur : 090 ms      QT Int : 406 ms       P-R-T Axes : 049 016 049 degrees     QTc Int : 422 ms    Normal sinus rhythm  Normal ECG  When compared with ECG of 07-JAN-2022 09:44,  No significant change was found  Confirmed by Gerda Kathleen MD ([id number]) on 5/16/2024 11:17:33 AM    Referred By: PERCY [facility]           Confirmed By:Gerda Kathleen MD       TTE:  Results for orders placed or performed during the hospital encounter of 05/16/24   Echo   Result Value Ref Range    LVOT stroke volume 80.95 cm3    LVIDd 4.58 3.5 - 6.0 cm    LV Systolic Volume 27.69 mL    LVIDs 2.73 2.1 - 4.0 cm    LV Diastolic Volume 96.55 mL    IVS 0.99 0.6 - 1.1 cm    LVOT diameter 2.18 cm    LVOT area 3.7 cm2    FS 40 28 - 44 %    Left Ventricle Relative Wall Thickness 0.36 cm    PW 0.82 0.6 - 1.1 cm    LV mass 137.75 g    MV Peak E Luis 0.54 m/s    TDI LATERAL 0.09 m/s    TDI SEPTAL 0.07 m/s    E/E' ratio 6.75 m/s    MV Peak A Luis 0.73 m/s    TR Max Luis 2.62 m/s    E/A ratio 0.74     E wave deceleration time 226.48 msec    LV SEPTAL E/E' RATIO 7.71 m/s    LV LATERAL E/E' RATIO 6.00 m/s    PV Peak S Luis 0.54 m/s    PV Peak D Luis 0.40 m/s    Pulm vein S/D ratio 1.35     LVOT peak luis 1.26 m/s    Left Ventricular Outflow Tract Mean Velocity 0.98 cm/s    Left Ventricular Outflow Tract Mean Gradient 4.21 mmHg    RV S' 17.35 cm/s    LA size 3.58 cm    Left Atrium Minor Axis 5.06 cm    Left Atrium Major Axis 5.66 cm    LA Vol (MOD) 49.79 cm3    RA Major Axis 4.59 cm    AV mean gradient 7 mmHg    AV peak gradient 11 mmHg    Ao peak luis 1.64 m/s    Ao VTI 26.60 cm    LVOT peak VTI 21.70 cm    AV valve area 3.04 cm²    AV Velocity Ratio 0.77     AV index (prosthetic)  "0.82     MÓNICA by Velocity Ratio 2.87 cm²    Mr max gisella 4.75 m/s    MV stenosis pressure 1/2 time 65.68 ms    MV valve area p 1/2 method 3.35 cm2    TV mean gradient 20 mmHg    Triscuspid Valve Regurgitation Peak Gradient 27 mmHg    RVOT peak gisella 0.88 m/s    STJ 3.19 cm    Ascending aorta 3.44 cm    IVC diameter 1.69 cm    Mean e' 0.08 m/s    LA Vol 50.40 cm3    TAPSE 2.30 cm    LA WIDTH 3.1 cm    RA Width 3.8 cm    Sinus 3.3 cm    BSA 2.1 m2    STELLA (MOD) 23.6 mL/m2    LV Mass Index 65 g/m2    STELLA 23.9 mL/m2    LV Diastolic Volume Index 45.76 mL/m2    LV Systolic Volume Index 13.1 mL/m2    ZLVIDS -3.09     ZLVIDD -3.67     TV resting pulmonary artery pressure 30 mmHg    RV TB RVSP 6 mmHg    Est. RA pres 3 mmHg    EF 65 %    GLS 18.0 %    Narrative      Left Ventricle: The left ventricle is normal in size. Ventricular mass   is normal. Normal wall thickness. There is normal systolic function.   Ejection fraction by visual approximation is 65%. Global longitudinal   strain is -18.0%. Global longitudinal strain is normal. There is normal   diastolic function. Normal left ventricular filling pressure. Tissue   Doppler velocity is normal.    Right Ventricle: Normal right ventricular cavity size. Wall thickness   is normal. Systolic function is normal.       EF   Date Value Ref Range Status   05/16/2024 65 % Final      No results found for this or any previous visit.  MARSHAL:  No results found for this or any previous visit.  Stress Test:  No results found for this or any previous visit.     LHC:  No results found for this or any previous visit.     PFT:  No results found for: "FEV1", "FVC", "CYN0VCF", "TLC", "DLCO"     ASSESSMENT/PLAN:         Pre-op Assessment    I have reviewed the Patient Summary Reports.    I have reviewed the NPO Status.   I have reviewed the Medications.     Review of Systems  Anesthesia Hx:  No problems with previous Anesthesia                Social:  Non-Smoker, Social Alcohol Use     "   Renal/:  Chronic Renal Disease                Endocrine:  Diabetes           Psych:  Psychiatric History                  Physical Exam  General: Well nourished, Alert and Oriented    Airway:  Mallampati: I   Mouth Opening: Normal  Neck ROM: Normal ROM    Dental:  Intact        Anesthesia Plan  Type of Anesthesia, risks & benefits discussed:    Anesthesia Type: Gen Natural Airway  Intra-op Monitoring Plan: Standard ASA Monitors  Induction:  IV  Informed Consent: Informed consent signed with the Patient and all parties understand the risks and agree with anesthesia plan.  All questions answered. Patient consented to blood products? No  ASA Score: 2  Day of Surgery Review of History & Physical: H&P Update referred to the surgeon/provider.    Ready For Surgery From Anesthesia Perspective.     .           [1]   No current facility-administered medications for this encounter.     Current Outpatient Medications   Medication Sig Dispense Refill    azelastine (ASTELIN) 137 mcg (0.1 %) nasal spray 1 spray (137 mcg total) by Nasal route 2 (two) times daily. 30 mL 3    dextroamphetamine-amphetamine (ADDERALL XR) 20 MG 24 hr capsule Take 20 mg by mouth every morning.      dextroamphetamine-amphetamine (ADDERALL XR) 30 MG 24 hr capsule Take 30 mg by mouth every morning.      ferrous sulfate (FEOSOL) 325 mg (65 mg iron) Tab tablet Take 1 tablet (325 mg total) by mouth every other day. With breakfast 45 tablet 0    papaverine 30 mg/mL injection Add Phentolamine 1 mg/cc  Add PGE1 10 mcg/cc    SIG:  Bring to office for test dose in physician office 5 mL 0    rosuvastatin (CRESTOR) 20 MG tablet TAKE 1 TABLET EVERY EVENING 90 tablet 3    sertraline (ZOLOFT) 100 MG tablet TAKE 1 TABLET BY MOUTH EVERY EVENING. 90 tablet 3    tamsulosin (FLOMAX) 0.4 mg Cap Take 1 capsule (0.4 mg total) by mouth once daily. 90 capsule 3

## 2025-07-31 ENCOUNTER — PATIENT MESSAGE (OUTPATIENT)
Dept: ENDOSCOPY | Facility: HOSPITAL | Age: 70
End: 2025-07-31
Payer: COMMERCIAL

## 2025-08-01 ENCOUNTER — ANESTHESIA (OUTPATIENT)
Dept: ENDOSCOPY | Facility: HOSPITAL | Age: 70
End: 2025-08-01
Payer: COMMERCIAL

## 2025-08-01 ENCOUNTER — HOSPITAL ENCOUNTER (OUTPATIENT)
Facility: HOSPITAL | Age: 70
Discharge: HOME | End: 2025-08-01
Attending: STUDENT IN AN ORGANIZED HEALTH CARE EDUCATION/TRAINING PROGRAM | Admitting: STUDENT IN AN ORGANIZED HEALTH CARE EDUCATION/TRAINING PROGRAM
Payer: COMMERCIAL

## 2025-08-01 VITALS
RESPIRATION RATE: 19 BRPM | TEMPERATURE: 98 F | OXYGEN SATURATION: 96 % | SYSTOLIC BLOOD PRESSURE: 105 MMHG | WEIGHT: 182 LBS | HEART RATE: 55 BPM | BODY MASS INDEX: 23.36 KG/M2 | HEIGHT: 74 IN | DIASTOLIC BLOOD PRESSURE: 59 MMHG

## 2025-08-01 DIAGNOSIS — D50.9 IRON DEFICIENCY ANEMIA, UNSPECIFIED IRON DEFICIENCY ANEMIA TYPE: ICD-10-CM

## 2025-08-01 DIAGNOSIS — D64.9 ANEMIA OF UNKNOWN ETIOLOGY: Primary | ICD-10-CM

## 2025-08-01 DIAGNOSIS — D50.9 IDA (IRON DEFICIENCY ANEMIA): ICD-10-CM

## 2025-08-01 PROCEDURE — 88305 TISSUE EXAM BY PATHOLOGIST: CPT | Mod: TC | Performed by: STUDENT IN AN ORGANIZED HEALTH CARE EDUCATION/TRAINING PROGRAM

## 2025-08-01 PROCEDURE — 63600175 PHARM REV CODE 636 W HCPCS: Performed by: NURSE ANESTHETIST, CERTIFIED REGISTERED

## 2025-08-01 PROCEDURE — 88305 TISSUE EXAM BY PATHOLOGIST: CPT | Mod: 26,,, | Performed by: PATHOLOGY

## 2025-08-01 PROCEDURE — 43239 EGD BIOPSY SINGLE/MULTIPLE: CPT | Performed by: STUDENT IN AN ORGANIZED HEALTH CARE EDUCATION/TRAINING PROGRAM

## 2025-08-01 PROCEDURE — 43239 EGD BIOPSY SINGLE/MULTIPLE: CPT | Mod: ,,, | Performed by: STUDENT IN AN ORGANIZED HEALTH CARE EDUCATION/TRAINING PROGRAM

## 2025-08-01 PROCEDURE — 99900035 HC TECH TIME PER 15 MIN (STAT)

## 2025-08-01 PROCEDURE — 25000003 PHARM REV CODE 250: Performed by: NURSE ANESTHETIST, CERTIFIED REGISTERED

## 2025-08-01 PROCEDURE — 27201012 HC FORCEPS, HOT/COLD, DISP: Performed by: STUDENT IN AN ORGANIZED HEALTH CARE EDUCATION/TRAINING PROGRAM

## 2025-08-01 PROCEDURE — 37000009 HC ANESTHESIA EA ADD 15 MINS: Performed by: STUDENT IN AN ORGANIZED HEALTH CARE EDUCATION/TRAINING PROGRAM

## 2025-08-01 PROCEDURE — 94761 N-INVAS EAR/PLS OXIMETRY MLT: CPT

## 2025-08-01 PROCEDURE — 37000008 HC ANESTHESIA 1ST 15 MINUTES: Performed by: STUDENT IN AN ORGANIZED HEALTH CARE EDUCATION/TRAINING PROGRAM

## 2025-08-01 RX ORDER — FENTANYL CITRATE 50 UG/ML
INJECTION, SOLUTION INTRAMUSCULAR; INTRAVENOUS
Status: DISCONTINUED | OUTPATIENT
Start: 2025-08-01 | End: 2025-08-01

## 2025-08-01 RX ORDER — SODIUM CHLORIDE 9 MG/ML
INJECTION, SOLUTION INTRAVENOUS CONTINUOUS
Status: DISCONTINUED | OUTPATIENT
Start: 2025-08-01 | End: 2025-08-01 | Stop reason: HOSPADM

## 2025-08-01 RX ORDER — PROPOFOL 10 MG/ML
VIAL (ML) INTRAVENOUS
Status: DISCONTINUED | OUTPATIENT
Start: 2025-08-01 | End: 2025-08-01

## 2025-08-01 RX ORDER — LIDOCAINE HYDROCHLORIDE 20 MG/ML
INJECTION INTRAVENOUS
Status: DISCONTINUED | OUTPATIENT
Start: 2025-08-01 | End: 2025-08-01

## 2025-08-01 RX ADMIN — SODIUM CHLORIDE: 0.9 INJECTION, SOLUTION INTRAVENOUS at 07:08

## 2025-08-01 RX ADMIN — LIDOCAINE HYDROCHLORIDE 100 MG: 20 INJECTION INTRAVENOUS at 07:08

## 2025-08-01 RX ADMIN — PROPOFOL 60 MG: 10 INJECTION, EMULSION INTRAVENOUS at 07:08

## 2025-08-01 RX ADMIN — FENTANYL CITRATE 50 MCG: 50 INJECTION, SOLUTION INTRAMUSCULAR; INTRAVENOUS at 07:08

## 2025-08-01 RX ADMIN — GLYCOPYRROLATE 0.1 MG: 0.2 INJECTION, SOLUTION INTRAMUSCULAR; INTRAVENOUS at 07:08

## 2025-08-01 RX ADMIN — PROPOFOL 165 MCG/KG/MIN: 10 INJECTION, EMULSION INTRAVENOUS at 07:08

## 2025-08-01 NOTE — TRANSFER OF CARE
"Anesthesia Transfer of Care Note    Patient: Lewis Fish    Procedure(s) Performed: Procedure(s) (LRB):  EGD (ESOPHAGOGASTRODUODENOSCOPY) (N/A)    Patient location: PACU    Anesthesia Type: general    Transport from OR: Transported from OR on room air with adequate spontaneous ventilation    Post pain: adequate analgesia    Post assessment: no apparent anesthetic complications and tolerated procedure well    Post vital signs: stable    Level of consciousness: awake, alert and oriented    Nausea/Vomiting: no nausea/vomiting    Complications: none    Transfer of care protocol was followed      Last vitals: Visit Vitals  BP (!) 141/78 (BP Location: Left arm, Patient Position: Lying)   Pulse 61   Temp 36.4 °C (97.5 °F) (Temporal)   Resp 16   Ht 6' 2" (1.88 m)   Wt 82.6 kg (182 lb)   SpO2 100%   BMI 23.37 kg/m²     "

## 2025-08-01 NOTE — ANESTHESIA POSTPROCEDURE EVALUATION
Anesthesia Post Evaluation    Patient: Lewis Fish    Procedure(s) Performed: Procedure(s) (LRB):  EGD (ESOPHAGOGASTRODUODENOSCOPY) (N/A)    Final Anesthesia Type: general      Patient location during evaluation: PACU  Patient participation: Yes- Able to Participate  Level of consciousness: awake and alert  Post-procedure vital signs: reviewed and stable  Pain management: adequate  Airway patency: patent    PONV status at discharge: No PONV  Anesthetic complications: no      Cardiovascular status: stable  Respiratory status: room air  Hydration status: euvolemic  Follow-up not needed.              Vitals Value Taken Time   /59 08/01/25 08:52   Temp 36.4 °C (97.5 °F) 08/01/25 07:45   Pulse 58 08/01/25 08:52   Resp 24 08/01/25 08:52   SpO2 96 % 08/01/25 08:52   Vitals shown include unfiled device data.      Event Time   Out of Recovery 08:45:00         Pain/Nany Score: Nany Score: 9 (8/1/2025  8:45 AM)

## 2025-08-05 LAB
ESTROGEN SERPL-MCNC: NORMAL PG/ML
INSULIN SERPL-ACNC: NORMAL U[IU]/ML
LAB AP CLINICAL INFORMATION: NORMAL
LAB AP GROSS DESCRIPTION: NORMAL
LAB AP PERFORMING LOCATION(S): NORMAL
LAB AP REPORT FOOTNOTES: NORMAL

## 2025-08-08 ENCOUNTER — RESULTS FOLLOW-UP (OUTPATIENT)
Dept: GASTROENTEROLOGY | Facility: CLINIC | Age: 70
End: 2025-08-08
Payer: COMMERCIAL

## (undated) DEVICE — GLOVE BIOGEL SKINSENSE PI 7.5

## (undated) DEVICE — PROBE ARTHO ENERGY 90 DEG

## (undated) DEVICE — ELECTRODE REM PLYHSV RETURN 9

## (undated) DEVICE — TIP YANKAUERS BULB NO VENT

## (undated) DEVICE — SUT PROLENE 1 CTX 30IN BLUE

## (undated) DEVICE — TUBE SET INFLOW/OUTFLOW

## (undated) DEVICE — UNDERGLOVES BIOGEL PI SIZE 8

## (undated) DEVICE — SOL ALCOHOL ISO 70% WNTGR 16OZ

## (undated) DEVICE — SPONGE COTTON TRAY 4X4IN

## (undated) DEVICE — DRAPE STERI INSTRUMENT 1018

## (undated) DEVICE — SUT HSE FIBER 36IN MO6 NDL

## (undated) DEVICE — DRAPE U SPLIT SHEET 54X76IN

## (undated) DEVICE — SUT MCRYL PLUS 4-0 PS2 27IN

## (undated) DEVICE — APPLICATOR CHLORAPREP ORN 26ML

## (undated) DEVICE — Device

## (undated) DEVICE — BLADE GATOR 4.2

## (undated) DEVICE — BLADE SHAVER 4.5 6/BX

## (undated) DEVICE — CANNULA PASSPORT 8 MM X 4CM.

## (undated) DEVICE — SUPPORT SLING SHOT II MEDIUM

## (undated) DEVICE — DRESSING XEROFORM FOIL PK 1X8

## (undated) DEVICE — SOL NACL IRR 3000ML

## (undated) DEVICE — VIDEO RENTAL SERVICE

## (undated) DEVICE — PENCIL ELECTROSURG HOLST W/BLD

## (undated) DEVICE — PAD ABD 8X10 STERILE

## (undated) DEVICE — KIT TRIMANO

## (undated) DEVICE — NDL SPECTRUM AUTOPASS

## (undated) DEVICE — TAPE SURG MEDIPORE 6X72IN

## (undated) DEVICE — DRAPE SHOULDER BEACH CHAIR

## (undated) DEVICE — SUT ETHILON 3-0 PS2 18 BLK

## (undated) DEVICE — PAD SHOULDER CARE POLAR

## (undated) DEVICE — SOL IRR SOD CHL .9% POUR

## (undated) DEVICE — DRAPE STERI U-SHAPED 47X51IN

## (undated) DEVICE — DRAPE SURG W/TWL 17 5/8X23